# Patient Record
Sex: FEMALE | Race: WHITE | NOT HISPANIC OR LATINO | Employment: OTHER | URBAN - METROPOLITAN AREA
[De-identification: names, ages, dates, MRNs, and addresses within clinical notes are randomized per-mention and may not be internally consistent; named-entity substitution may affect disease eponyms.]

---

## 2020-07-05 ENCOUNTER — APPOINTMENT (EMERGENCY)
Dept: RADIOLOGY | Facility: HOSPITAL | Age: 75
DRG: 557 | End: 2020-07-05
Payer: MEDICARE

## 2020-07-05 ENCOUNTER — HOSPITAL ENCOUNTER (INPATIENT)
Facility: HOSPITAL | Age: 75
LOS: 9 days | DRG: 557 | End: 2020-07-14
Attending: EMERGENCY MEDICINE | Admitting: GENERAL PRACTICE
Payer: MEDICARE

## 2020-07-05 DIAGNOSIS — E43 SEVERE PROTEIN-CALORIE MALNUTRITION (HCC): ICD-10-CM

## 2020-07-05 DIAGNOSIS — J18.9 PNEUMONIA: Primary | ICD-10-CM

## 2020-07-05 DIAGNOSIS — N18.6 ESRD (END STAGE RENAL DISEASE) (HCC): ICD-10-CM

## 2020-07-05 DIAGNOSIS — F03.90 DEMENTIA (HCC): ICD-10-CM

## 2020-07-05 PROBLEM — Z86.73 HISTORY OF CVA (CEREBROVASCULAR ACCIDENT): Status: ACTIVE | Noted: 2020-07-05

## 2020-07-05 PROBLEM — R74.8 ELEVATED CPK: Status: ACTIVE | Noted: 2020-07-05

## 2020-07-05 PROBLEM — R77.8 ELEVATED TROPONIN: Status: ACTIVE | Noted: 2020-07-05

## 2020-07-05 PROBLEM — M06.9 RA (RHEUMATOID ARTHRITIS) (HCC): Status: ACTIVE | Noted: 2020-07-05

## 2020-07-05 PROBLEM — I25.10 CAD (CORONARY ARTERY DISEASE): Status: ACTIVE | Noted: 2020-07-05

## 2020-07-05 PROBLEM — R26.2 AMBULATORY DYSFUNCTION: Status: ACTIVE | Noted: 2020-07-05

## 2020-07-05 PROBLEM — R56.9 SEIZURE (HCC): Status: ACTIVE | Noted: 2020-07-05

## 2020-07-05 PROBLEM — I16.0 HYPERTENSIVE URGENCY: Status: ACTIVE | Noted: 2020-07-05

## 2020-07-05 LAB
ALBUMIN SERPL BCP-MCNC: 2.8 G/DL (ref 3.5–5)
ALP SERPL-CCNC: 129 U/L (ref 46–116)
ALT SERPL W P-5'-P-CCNC: 54 U/L (ref 12–78)
ANION GAP SERPL CALCULATED.3IONS-SCNC: 8 MMOL/L (ref 4–13)
AST SERPL W P-5'-P-CCNC: 70 U/L (ref 5–45)
ATRIAL RATE: 74 BPM
BACTERIA UR QL AUTO: ABNORMAL /HPF
BASOPHILS # BLD AUTO: 0.02 THOUSANDS/ΜL (ref 0–0.1)
BASOPHILS NFR BLD AUTO: 1 % (ref 0–1)
BILIRUB SERPL-MCNC: 1 MG/DL (ref 0.2–1)
BILIRUB UR QL STRIP: NEGATIVE
BUN SERPL-MCNC: 37 MG/DL (ref 5–25)
CALCIUM SERPL-MCNC: 9.5 MG/DL (ref 8.3–10.1)
CHLORIDE SERPL-SCNC: 102 MMOL/L (ref 100–108)
CK MB SERPL-MCNC: 293.2 NG/ML (ref 0–5)
CK MB SERPL-MCNC: 33.5 % (ref 0–2.5)
CK SERPL-CCNC: 874 U/L (ref 26–192)
CLARITY UR: ABNORMAL
CO2 SERPL-SCNC: 29 MMOL/L (ref 21–32)
COLOR UR: YELLOW
CREAT SERPL-MCNC: 2.59 MG/DL (ref 0.6–1.3)
EOSINOPHIL # BLD AUTO: 0.01 THOUSAND/ΜL (ref 0–0.61)
EOSINOPHIL NFR BLD AUTO: 0 % (ref 0–6)
ERYTHROCYTE [DISTWIDTH] IN BLOOD BY AUTOMATED COUNT: 15.7 % (ref 11.6–15.1)
GFR SERPL CREATININE-BSD FRML MDRD: 18 ML/MIN/1.73SQ M
GLUCOSE SERPL-MCNC: 74 MG/DL (ref 65–140)
GLUCOSE UR STRIP-MCNC: NEGATIVE MG/DL
HCT VFR BLD AUTO: 34.4 % (ref 34.8–46.1)
HGB BLD-MCNC: 10.9 G/DL (ref 11.5–15.4)
HGB UR QL STRIP.AUTO: ABNORMAL
HYALINE CASTS #/AREA URNS LPF: ABNORMAL /LPF
IMM GRANULOCYTES # BLD AUTO: 0.01 THOUSAND/UL (ref 0–0.2)
IMM GRANULOCYTES NFR BLD AUTO: 0 % (ref 0–2)
KETONES UR STRIP-MCNC: ABNORMAL MG/DL
LACTATE SERPL-SCNC: 1.5 MMOL/L (ref 0.5–2)
LEUKOCYTE ESTERASE UR QL STRIP: ABNORMAL
LYMPHOCYTES # BLD AUTO: 0.53 THOUSANDS/ΜL (ref 0.6–4.47)
LYMPHOCYTES NFR BLD AUTO: 19 % (ref 14–44)
MCH RBC QN AUTO: 31.6 PG (ref 26.8–34.3)
MCHC RBC AUTO-ENTMCNC: 31.7 G/DL (ref 31.4–37.4)
MCV RBC AUTO: 100 FL (ref 82–98)
MONOCYTES # BLD AUTO: 0.19 THOUSAND/ΜL (ref 0.17–1.22)
MONOCYTES NFR BLD AUTO: 7 % (ref 4–12)
NEUTROPHILS # BLD AUTO: 2.03 THOUSANDS/ΜL (ref 1.85–7.62)
NEUTS SEG NFR BLD AUTO: 73 % (ref 43–75)
NITRITE UR QL STRIP: NEGATIVE
NON-SQ EPI CELLS URNS QL MICRO: ABNORMAL /HPF
NRBC BLD AUTO-RTO: 0 /100 WBCS
P AXIS: 65 DEGREES
PH UR STRIP.AUTO: 7 [PH] (ref 4.5–8)
PLATELET # BLD AUTO: 77 THOUSANDS/UL (ref 149–390)
PMV BLD AUTO: 9.5 FL (ref 8.9–12.7)
POTASSIUM SERPL-SCNC: 4.2 MMOL/L (ref 3.5–5.3)
PR INTERVAL: 168 MS
PROLACTIN SERPL-MCNC: 22 NG/ML
PROT SERPL-MCNC: 6.8 G/DL (ref 6.4–8.2)
PROT UR STRIP-MCNC: >=300 MG/DL
QRS AXIS: 7 DEGREES
QRSD INTERVAL: 80 MS
QT INTERVAL: 432 MS
QTC INTERVAL: 479 MS
RBC # BLD AUTO: 3.45 MILLION/UL (ref 3.81–5.12)
RBC #/AREA URNS AUTO: ABNORMAL /HPF
SARS-COV-2 RNA RESP QL NAA+PROBE: NEGATIVE
SODIUM SERPL-SCNC: 139 MMOL/L (ref 136–145)
SP GR UR STRIP.AUTO: 1.02 (ref 1–1.03)
T WAVE AXIS: 56 DEGREES
TROPONIN I SERPL-MCNC: 0.07 NG/ML
TROPONIN I SERPL-MCNC: 0.08 NG/ML
TROPONIN I SERPL-MCNC: 0.09 NG/ML
UROBILINOGEN UR QL STRIP.AUTO: 0.2 E.U./DL
VENTRICULAR RATE: 74 BPM
WBC # BLD AUTO: 2.79 THOUSAND/UL (ref 4.31–10.16)
WBC #/AREA URNS AUTO: ABNORMAL /HPF

## 2020-07-05 PROCEDURE — 87081 CULTURE SCREEN ONLY: CPT | Performed by: GENERAL PRACTICE

## 2020-07-05 PROCEDURE — 70450 CT HEAD/BRAIN W/O DYE: CPT

## 2020-07-05 PROCEDURE — 87040 BLOOD CULTURE FOR BACTERIA: CPT | Performed by: EMERGENCY MEDICINE

## 2020-07-05 PROCEDURE — 83605 ASSAY OF LACTIC ACID: CPT | Performed by: EMERGENCY MEDICINE

## 2020-07-05 PROCEDURE — 72125 CT NECK SPINE W/O DYE: CPT

## 2020-07-05 PROCEDURE — 84484 ASSAY OF TROPONIN QUANT: CPT | Performed by: PHYSICIAN ASSISTANT

## 2020-07-05 PROCEDURE — 93005 ELECTROCARDIOGRAM TRACING: CPT

## 2020-07-05 PROCEDURE — 99285 EMERGENCY DEPT VISIT HI MDM: CPT | Performed by: EMERGENCY MEDICINE

## 2020-07-05 PROCEDURE — 82553 CREATINE MB FRACTION: CPT | Performed by: EMERGENCY MEDICINE

## 2020-07-05 PROCEDURE — 82550 ASSAY OF CK (CPK): CPT | Performed by: EMERGENCY MEDICINE

## 2020-07-05 PROCEDURE — 84146 ASSAY OF PROLACTIN: CPT | Performed by: PHYSICIAN ASSISTANT

## 2020-07-05 PROCEDURE — 99285 EMERGENCY DEPT VISIT HI MDM: CPT

## 2020-07-05 PROCEDURE — 99223 1ST HOSP IP/OBS HIGH 75: CPT | Performed by: GENERAL PRACTICE

## 2020-07-05 PROCEDURE — 36415 COLL VENOUS BLD VENIPUNCTURE: CPT | Performed by: EMERGENCY MEDICINE

## 2020-07-05 PROCEDURE — 84484 ASSAY OF TROPONIN QUANT: CPT | Performed by: EMERGENCY MEDICINE

## 2020-07-05 PROCEDURE — 80053 COMPREHEN METABOLIC PANEL: CPT | Performed by: EMERGENCY MEDICINE

## 2020-07-05 PROCEDURE — 93010 ELECTROCARDIOGRAM REPORT: CPT | Performed by: INTERNAL MEDICINE

## 2020-07-05 PROCEDURE — 81001 URINALYSIS AUTO W/SCOPE: CPT

## 2020-07-05 PROCEDURE — U0003 INFECTIOUS AGENT DETECTION BY NUCLEIC ACID (DNA OR RNA); SEVERE ACUTE RESPIRATORY SYNDROME CORONAVIRUS 2 (SARS-COV-2) (CORONAVIRUS DISEASE [COVID-19]), AMPLIFIED PROBE TECHNIQUE, MAKING USE OF HIGH THROUGHPUT TECHNOLOGIES AS DESCRIBED BY CMS-2020-01-R: HCPCS | Performed by: GENERAL PRACTICE

## 2020-07-05 PROCEDURE — 85025 COMPLETE CBC W/AUTO DIFF WBC: CPT | Performed by: EMERGENCY MEDICINE

## 2020-07-05 PROCEDURE — 99223 1ST HOSP IP/OBS HIGH 75: CPT | Performed by: INTERNAL MEDICINE

## 2020-07-05 PROCEDURE — 71046 X-RAY EXAM CHEST 2 VIEWS: CPT

## 2020-07-05 PROCEDURE — 87493 C DIFF AMPLIFIED PROBE: CPT | Performed by: GENERAL PRACTICE

## 2020-07-05 PROCEDURE — 96365 THER/PROPH/DIAG IV INF INIT: CPT

## 2020-07-05 RX ORDER — METOPROLOL TARTRATE 50 MG/1
50 TABLET, FILM COATED ORAL EVERY 12 HOURS SCHEDULED
Status: DISCONTINUED | OUTPATIENT
Start: 2020-07-05 | End: 2020-07-05

## 2020-07-05 RX ORDER — HYDROXYCHLOROQUINE SULFATE 200 MG/1
200 TABLET, FILM COATED ORAL
Status: DISCONTINUED | OUTPATIENT
Start: 2020-07-06 | End: 2020-07-14

## 2020-07-05 RX ORDER — METOPROLOL TARTRATE 50 MG/1
50 TABLET, FILM COATED ORAL EVERY 12 HOURS SCHEDULED
COMMUNITY
End: 2020-07-14 | Stop reason: HOSPADM

## 2020-07-05 RX ORDER — METOPROLOL TARTRATE 5 MG/5ML
5 INJECTION INTRAVENOUS EVERY 6 HOURS
Status: DISCONTINUED | OUTPATIENT
Start: 2020-07-05 | End: 2020-07-14

## 2020-07-05 RX ORDER — CHOLESTYRAMINE 4 G/9G
1 POWDER, FOR SUSPENSION ORAL 2 TIMES DAILY WITH MEALS
COMMUNITY
End: 2020-07-14 | Stop reason: HOSPADM

## 2020-07-05 RX ORDER — TRAZODONE HYDROCHLORIDE 50 MG/1
50 TABLET ORAL
Status: DISCONTINUED | OUTPATIENT
Start: 2020-07-05 | End: 2020-07-14

## 2020-07-05 RX ORDER — SENNOSIDES 8.6 MG
1 TABLET ORAL DAILY
Status: DISCONTINUED | OUTPATIENT
Start: 2020-07-05 | End: 2020-07-14

## 2020-07-05 RX ORDER — PANTOPRAZOLE SODIUM 40 MG/1
40 TABLET, DELAYED RELEASE ORAL DAILY
Status: DISCONTINUED | OUTPATIENT
Start: 2020-07-05 | End: 2020-07-10

## 2020-07-05 RX ORDER — ACETAMINOPHEN 325 MG/1
650 TABLET ORAL EVERY 6 HOURS PRN
Status: DISCONTINUED | OUTPATIENT
Start: 2020-07-05 | End: 2020-07-07

## 2020-07-05 RX ORDER — HEPARIN SODIUM 5000 [USP'U]/ML
5000 INJECTION, SOLUTION INTRAVENOUS; SUBCUTANEOUS EVERY 8 HOURS SCHEDULED
Status: DISCONTINUED | OUTPATIENT
Start: 2020-07-05 | End: 2020-07-14

## 2020-07-05 RX ORDER — CHOLESTYRAMINE LIGHT 4 G/5.7G
4 POWDER, FOR SUSPENSION ORAL 2 TIMES DAILY
Status: DISCONTINUED | OUTPATIENT
Start: 2020-07-05 | End: 2020-07-14

## 2020-07-05 RX ORDER — FOLIC ACID 1 MG/1
1 TABLET ORAL DAILY
Status: DISCONTINUED | OUTPATIENT
Start: 2020-07-05 | End: 2020-07-14

## 2020-07-05 RX ORDER — PANTOPRAZOLE SODIUM 40 MG/1
40 TABLET, DELAYED RELEASE ORAL DAILY
COMMUNITY
End: 2020-07-14 | Stop reason: HOSPADM

## 2020-07-05 RX ORDER — FOLIC ACID 1 MG/1
TABLET ORAL DAILY
COMMUNITY
End: 2020-07-14 | Stop reason: HOSPADM

## 2020-07-05 RX ORDER — LIDOCAINE AND PRILOCAINE 25; 25 MG/G; MG/G
CREAM TOPICAL SEE ADMIN INSTRUCTIONS
COMMUNITY
End: 2020-07-14 | Stop reason: HOSPADM

## 2020-07-05 RX ORDER — CHOLECALCIFEROL (VITAMIN D3) 10 MCG
1 TABLET ORAL
Status: DISCONTINUED | OUTPATIENT
Start: 2020-07-05 | End: 2020-07-14

## 2020-07-05 RX ORDER — AMLODIPINE BESYLATE 2.5 MG/1
2.5 TABLET ORAL DAILY
Status: DISCONTINUED | OUTPATIENT
Start: 2020-07-05 | End: 2020-07-06

## 2020-07-05 RX ORDER — TRAZODONE HYDROCHLORIDE 50 MG/1
50 TABLET ORAL DAILY
COMMUNITY
End: 2020-07-14 | Stop reason: HOSPADM

## 2020-07-05 RX ORDER — CALCIUM CARBONATE 200(500)MG
1000 TABLET,CHEWABLE ORAL DAILY PRN
Status: DISCONTINUED | OUTPATIENT
Start: 2020-07-05 | End: 2020-07-14 | Stop reason: HOSPADM

## 2020-07-05 RX ORDER — LEVETIRACETAM 500 MG/1
500 TABLET ORAL
Status: DISCONTINUED | OUTPATIENT
Start: 2020-07-07 | End: 2020-07-05

## 2020-07-05 RX ORDER — LEVETIRACETAM 500 MG/1
500 TABLET ORAL SEE ADMIN INSTRUCTIONS
COMMUNITY
End: 2020-07-14 | Stop reason: HOSPADM

## 2020-07-05 RX ORDER — LEVETIRACETAM 500 MG/1
250 TABLET ORAL
Status: DISCONTINUED | OUTPATIENT
Start: 2020-07-06 | End: 2020-07-05

## 2020-07-05 RX ORDER — LEVETIRACETAM 500 MG/1
500 TABLET ORAL DAILY
Status: DISCONTINUED | OUTPATIENT
Start: 2020-07-05 | End: 2020-07-05

## 2020-07-05 RX ORDER — HYDROXYCHLOROQUINE SULFATE 200 MG/1
200 TABLET, FILM COATED ORAL
COMMUNITY
End: 2020-07-14 | Stop reason: HOSPADM

## 2020-07-05 RX ORDER — VANCOMYCIN HYDROCHLORIDE 1 G/200ML
20 INJECTION, SOLUTION INTRAVENOUS ONCE
Status: COMPLETED | OUTPATIENT
Start: 2020-07-05 | End: 2020-07-05

## 2020-07-05 RX ORDER — ONDANSETRON 2 MG/ML
4 INJECTION INTRAMUSCULAR; INTRAVENOUS EVERY 6 HOURS PRN
Status: DISCONTINUED | OUTPATIENT
Start: 2020-07-05 | End: 2020-07-14 | Stop reason: HOSPADM

## 2020-07-05 RX ADMIN — VANCOMYCIN HYDROCHLORIDE 1000 MG: 1 INJECTION, SOLUTION INTRAVENOUS at 11:55

## 2020-07-05 RX ADMIN — FOLIC ACID 1 MG: 1 TABLET ORAL at 15:18

## 2020-07-05 RX ADMIN — Medication 1 CAPSULE: at 15:18

## 2020-07-05 RX ADMIN — METOPROLOL TARTRATE 5 MG: 5 INJECTION INTRAVENOUS at 16:04

## 2020-07-05 RX ADMIN — CEFEPIME HYDROCHLORIDE 2000 MG: 2 INJECTION, POWDER, FOR SOLUTION INTRAVENOUS at 11:21

## 2020-07-05 RX ADMIN — METOPROLOL TARTRATE 50 MG: 50 TABLET, FILM COATED ORAL at 15:17

## 2020-07-05 RX ADMIN — METOPROLOL TARTRATE 5 MG: 5 INJECTION INTRAVENOUS at 21:13

## 2020-07-05 RX ADMIN — STANDARDIZED SENNA CONCENTRATE 8.6 MG: 8.6 TABLET ORAL at 15:17

## 2020-07-05 RX ADMIN — HEPARIN SODIUM 5000 UNITS: 5000 INJECTION INTRAVENOUS; SUBCUTANEOUS at 15:18

## 2020-07-05 RX ADMIN — LEVETIRACETAM 500 MG: 500 TABLET, FILM COATED ORAL at 15:17

## 2020-07-05 RX ADMIN — PANTOPRAZOLE SODIUM 40 MG: 40 TABLET, DELAYED RELEASE ORAL at 15:18

## 2020-07-05 RX ADMIN — HEPARIN SODIUM 5000 UNITS: 5000 INJECTION INTRAVENOUS; SUBCUTANEOUS at 21:12

## 2020-07-05 NOTE — ASSESSMENT & PLAN NOTE
New resident at [facility], reportedly had unwitnessed fall today out of bed   Trauma imaging negative for acute fx  Consider infx etiology - CXR shows possible infiltrate but pt asx without fever, monitor off abx   PT/OT consults prior to return to dementia unit   Pt has dementia, unknown if there was true LOC   Consider possibility of breakthrough seizure - monitor closely   Fall precautions

## 2020-07-05 NOTE — H&P
H&P- July Tamica 1945, 70 y o  female MRN: [id number]  [facility] Encounter: [id number] DOS: 7/5/2020  Primary Care Provider: No primary care provider on file  Date and time admitted to hospital: 7/5/2020  8:22 AM    * Ambulatory dysfunction  Assessment & Plan  New resident at [facility], reportedly had unwitnessed fall today out of bed   Trauma imaging negative for acute fx  Consider infx etiology - CXR shows possible infiltrate but pt asx without fever, monitor off abx   PT/OT consults prior to return to dementia unit   Pt has dementia, unknown if there was true LOC   Consider possibility of breakthrough seizure - monitor closely   Fall precautions     Seizure [facility])  Assessment & Plan  Hx of, consider possibility of breakthrough sz   Continue keppra   sz precautions     Elevated troponin  Assessment & Plan  Troponin 0 09, will trend x 3   Consider possibility of non mi troponin elevation  in the setting of fall, possible seizure, mild rhabdo, underlying ESRD vs nstemi type 2 in the setting of htn urgency   EKG NSR with PACs, repeat EKG in AM  Monitor on tele overnight     Hypertensive urgency  Assessment & Plan  Resume lopressor 50mg BID   Add PRN hydralazine  HD tomorrow     Dementia [facility])  Assessment & Plan  Continue trazodone at bedtime   Moved in dementia unit [facility] on Friday 7/3  Delirium precautions     ESRD (end stage renal disease) ([facility] [address] )  Assessment & Plan  HD MWF   C/s nephrology for management     VTE Prophylaxis: Heparin  / sequential compression device   Code Status: DNR DNI confirmed with partner rajeev   POLST: POLST form is not discussed and not completed at this time  Discussion with family: called partner     Anticipated Length of Stay:  Patient will be admitted on an Inpatient basis with an anticipated length of stay of  > 2 midnights  Justification for Hospital Stay: elevated trop, fall, pt ot     Total Time for Visit, including Counseling / Coordination of Care: 45 minutes  Greater than 50% of this total time spent on direct patient counseling and coordination of care  Chief Complaint:   Fall at SNF     History of Present Illness:    Dontae Barrera is a 70 y o  female w PMH + for dementia, ambulatory dysfunction, ESRD, HTN, Sz d/o who presents with fall at dementia unit  Patient is a poor historian  I personally called [facility] and spoke with a caregiver who states patient was found in her room on the floor and complaining of pain  Unable to specify where pain was  States her mental status was at her baseline, "light confusion " patient also tells me she has pain but cannot locate  Called pts partner Dian Kramer who states patient often climbs out of bed and slides to the ground and not actually falling, just getting out of bed  States Kari Hairston just moved to [facility] 3 days ago and Dian Williams wonders if it is due to adjustment  Has ha    Had heart attack, progressive CKD to ESRD, and a stroke 1 year ago  Has had declined with dementia since  She had live in aide at home but pt was getting difficult to handle at home and she was admitted to [facility] on Friday  States she is dnr dni  Review of Systems:    Review of Systems   Unable to perform ROS: Dementia     Past Medical and Surgical History:     Past Medical History:   Diagnosis Date    Dialysis patient ([facility] [address] )     Epilepsy ([facility] [address] )     Kidney disease     Seizure ([facility] )        History reviewed  No pertinent surgical history  Meds/Allergies:    Prior to Admission medications    Medication Sig Start Date End Date Taking?  Authorizing Provider   B Complex-C-Folic Acid (HORTENSIA-DAVIAN PO) Take by mouth daily   Yes Historical Provider, MD   cholestyramine (QUESTRAN) 4 g packet Take 1 packet by mouth 2 (two) times a day with meals   Yes Historical Provider, MD   folic acid (FOLVITE) 1 mg tablet Take by mouth daily   Yes Historical Provider, MD   hydroxychloroquine (PLAQUENIL) 200 mg tablet Take 200 mg by mouth daily with breakfast   Yes Historical Provider, MD   levETIRAcetam (KEPPRA) 500 mg tablet Take 500 mg by mouth see administration instructions 1 tablet daily  1/2 tablet a day every mon wed fri on dialysis days   Yes Historical Provider, MD   lidocaine-prilocaine (EMLA) cream Apply topically see administration instructions Apply to arm 1 hour before dialysis on Monday, Wednesday, Friday   Yes Historical Provider, MD   metoprolol tartrate (LOPRESSOR) 50 mg tablet Take 50 mg by mouth every 12 (twelve) hours   Yes Historical Provider, MD   Nutritional Supplements (NEPRO/CARBSTEADY PO) Take by mouth   Yes Historical Provider, MD   Pancrelipase, Lip-Prot-Amyl, (Zenpep) 14349-59188 units CPEP Take by mouth 3 (three) times a day   Yes Historical Provider, MD   pantoprazole (PROTONIX) 40 mg tablet Take 40 mg by mouth daily   Yes Historical Provider, MD   traZODone (DESYREL) 50 mg tablet Take 50 mg by mouth daily   Yes Historical Provider, MD     I have reveiwed home medications using records provided by [facility]  Allergies: No Known Allergies    Social History:     Marital Status: Unknown   Occupation: unknown   Patient Pre-hospital Living Situation: just moved to [facility]   Patient Pre-hospital Level of Mobility: unknown   Patient Pre-hospital Diet Restrictions: mechanical soft   Substance Use History:   Social History     Substance and Sexual Activity   Alcohol Use Not Currently     Social History     Tobacco Use   Smoking Status Unknown If Ever Smoked     Social History     Substance and Sexual Activity   Drug Use Never       Family History:    History reviewed  No pertinent family history  Physical Exam:     Vitals:   Blood Pressure: (!) 182/75 (07/05/20 1433)  Pulse: 89 (07/05/20 1433)  Temperature: 97 5 °F (36 4 °C) (07/05/20 1433)  Temp Source: Rectal (07/05/20 0840)  Respirations: 16 (07/05/20 1433)  Weight - Scale: 54 4 kg (119 lb 14 9 oz) (07/05/20 1438)  SpO2: 97 % (07/05/20 1433)    Physical Exam   Constitutional: She appears well-developed   No distress  Thin elderly female trying to get out of bed    HENT:   Head: Normocephalic and atraumatic  Eyes: EOM are normal  No scleral icterus  Neck: Normal range of motion  Neck supple  Cardiovascular: Normal rate, regular rhythm and normal heart sounds  No murmur heard  Pulmonary/Chest: Effort normal and breath sounds normal    Abdominal: Soft  Bowel sounds are normal    Musculoskeletal: Normal range of motion  She exhibits edema  Neurological: She is alert  Oriented to self and todays date    Skin: Skin is warm and dry  Psychiatric:   Pleasantly confused      Additional Data:     Lab Results: I have personally reviewed pertinent reports  Results from last 7 days   Lab Units 07/05/20  0911   WBC Thousand/uL 2 79*   HEMOGLOBIN g/dL 10 9*   HEMATOCRIT % 34 4*   PLATELETS Thousands/uL 77*   NEUTROS PCT % 73   LYMPHS PCT % 19   MONOS PCT % 7   EOS PCT % 0     Results from last 7 days   Lab Units 07/05/20  0911   SODIUM mmol/L 139   POTASSIUM mmol/L 4 2   CHLORIDE mmol/L 102   CO2 mmol/L 29   BUN mg/dL 37*   CREATININE mg/dL 2 59*   ANION GAP mmol/L 8   CALCIUM mg/dL 9 5   ALBUMIN g/dL 2 8*   TOTAL BILIRUBIN mg/dL 1 00   ALK PHOS U/L 129*   ALT U/L 54   AST U/L 70*   GLUCOSE RANDOM mg/dL 74                 Results from last 7 days   Lab Units 07/05/20  1109   LACTIC ACID mmol/L 1 5       Imaging: I have personally reviewed pertinent reports  XR chest 2 views   ED Interpretation by Adelaide Benson MD (07/05 1035)   RML infiltrate      Final Result by Titi Isidro MD (07/05 7786)      Increased opacity in the right middle lobe with retraction of the minor fissure, at least in part due to atelectasis  Superimposed pneumonia cannot be excluded  Small left pleural effusion and left base atelectasis  Superimposed pneumonia cannot be excluded              Workstation performed: [id number]         CT head without contrast   Final Result by Manoj Chase MD (07/05 8078)      No acute intracranial process  No skull fracture  Chronic microangiopathy and small bifrontal cortical infarcts  Workstation performed: [id number]         CT cervical spine without contrast   Final Result by Sheryl Boas, MD (07/05 3746)      No cervical spine fracture or traumatic malalignment  Mild multilevel cervical degenerative changes most prominent at C4-5 and C5-6  Workstation performed: [id number]             EKG, Pathology, and Other Studies Reviewed on Admission:   · EKG: nsr with pacs    Allscripts / Epic Records Reviewed: Yes     ** Please Note: This note has been constructed using a voice recognition system   **

## 2020-07-05 NOTE — CONSULTS
Consultation - Nephrology   Nelta Litten 70 y o  female MRN: [id number]  Unit/Bed#: [facility] 913-01 Encounter: [id number]      Assessment/Plan:  1  End-stage renal disease reportedly Monday Wednesday Friday, patient is a resident of [facility]  2  Mildly elevated CPK, 174, possibly secondary to repeated falls  3  Anemia of chronic disease hemoglobin at 10 9  4  Ambulatory dysfunction with reported fall  5  Seizure disorder currently on Keppra  6  Accelerated hypertension, patient does appear anxious, would continue with Lopressor, add low-dose calcium channel blocker  7  Suspicion for pneumonia currently on cefepime  8  Underlying dementia currently in dementia unit at [facility]:  · Plan for maintenance hemodialysis, currently Monday Wednesday Friday  · Obtain records from outpatient dialysis unit  · Continue monitor CPK  · Add calcium channel blocker given uncontrolled hypertension, continue with metoprolol    History of Present Illness   Physician Requesting Consult: Rose Kimble DO  Reason for Consult / Principal Problem:  End-stage renal disease  HPI: Nelta Litten is a 70y o  year old female who presents with fall  Patient is a 81-year-old female with a past medical history significant for end-stage renal disease apparently maintenance hemodialysis Monday Wednesday Friday although resident at [facility]  , underlying dementia, ambulatory dysfunction, hypertension, seizure disorder who presents after fall at the dementia unit  Caregiver at nursing home found patient in her room on the floor and complaining of pain  Patient was unable to localize pain  Also according to the patient's partner apparently patient often climbs out of bed and had recently moved into the dementia unit      Review of systems unobtainable secondary to patient's underlying dementia  History obtained from chart review and unobtainable from patient due to mental status    Review of Systems    Pertinent findings of a 10 point review of systems noted above otherwise all others negative    Historical Information   Patient Active Problem List   Diagnosis    ESRD (end stage renal disease) ([place] [address] )    Ambulatory dysfunction    Dementia ([place] [address] )    Hypertensive urgency    Elevated troponin    Elevated CPK    Seizure ([place] [address] )    CAD (coronary artery disease)    History of CVA (cerebrovascular accident)    RA (rheumatoid arthritis) ([place] [address] )     Past Medical History:   Diagnosis Date    Dialysis patient ([place] [address] )     Epilepsy ([place] [address] )     Kidney disease     Seizure ([place] [address] )      History reviewed  No pertinent surgical history  Social History   Social History     Substance and Sexual Activity   Alcohol Use Not Currently     Social History     Substance and Sexual Activity   Drug Use Never     Social History     Tobacco Use   Smoking Status Unknown If Ever Smoked     History reviewed  No pertinent family history      Meds/Allergies      current meds:   Current Facility-Administered Medications   Medication Dose Route Frequency    acetaminophen (TYLENOL) tablet 650 mg  650 mg Oral Q6H PRN    b complex-vitamin C-folic acid (NEPHROCAPS) capsule 1 capsule  1 capsule Oral Daily With Dinner    calcium carbonate (TUMS) chewable tablet 1,000 mg  1,000 mg Oral Daily PRN    cholestyramine sugar free (QUESTRAN LIGHT) packet 4 g  4 g Oral BID    folic acid (FOLVITE) tablet 1 mg  1 mg Oral Daily    heparin (porcine) subcutaneous injection 5,000 Units  5,000 Units Subcutaneous Q8H [address]    [START ON 7/6/2020] hydroxychloroquine (PLAQUENIL) tablet 200 mg  200 mg Oral Daily With Breakfast    [START ON 7/7/2020] levETIRAcetam (KEPPRA) 500 mg in sodium chloride 0 9 % 100 mL IVPB  500 mg Intravenous Once per day on Sun Tue Thu Sat    [START ON 7/6/2020] levETIRAcetam (KEPPRA) 750 mg in sodium chloride 0 9 % 100 mL IVPB  750 mg Intravenous Once per day on Mon Wed Fri    metoprolol (LOPRESSOR) injection 5 mg  5 mg Intravenous Q6H    ondansetron (ZOFRAN) injection 4 mg  4 mg Intravenous Q6H PRN    pantoprazole (PROTONIX) EC tablet 40 mg  40 mg Oral Daily    senna (SENOKOT) tablet 8 6 mg  1 tablet Oral Daily    traZODone (DESYREL) tablet 50 mg  50 mg Oral HS       No Known Allergies      Objective   BP (!) 182/75   Pulse 89   Temp 97 5 °F (36 4 °C)   Resp 16   Wt 54 4 kg (119 lb 14 9 oz)   SpO2 97%     Intake/Output Summary (Last 24 hours) at 7/5/2020 1610  Last data filed at 7/5/2020 1153  Gross per 24 hour   Intake 53 33 ml   Output    Net 53 33 ml       Current Weight: Weight - Scale: 54 4 kg (119 lb 14 9 oz)    Physical Exam   Constitutional: No distress  HENT:   Head: Normocephalic and atraumatic  Eyes: No scleral icterus  Neck: Neck supple  Cardiovascular: Normal rate and regular rhythm  Pulmonary/Chest: Effort normal and breath sounds normal    Abdominal: Soft  She exhibits no distension  There is no tenderness  Musculoskeletal: She exhibits no edema or deformity  AV access, right upper arm with positive bruit and thrill   Lymphadenopathy:     She has no cervical adenopathy (No obvious adenopathy)  Neurological: She is alert  Skin: Skin is warm and dry  No rash noted     Psychiatric:   Anxious appearing         Lab Results:    Results from last 7 days   Lab Units 07/05/20  0911   WBC Thousand/uL 2 79*   HEMOGLOBIN g/dL 10 9*   HEMATOCRIT % 34 4*   PLATELETS Thousands/uL 77*     Results from last 7 days   Lab Units 07/05/20  0911   POTASSIUM mmol/L 4 2   CHLORIDE mmol/L 102   CO2 mmol/L 29   BUN mg/dL 37*   CREATININE mg/dL 2 59*   CALCIUM mg/dL 9 5

## 2020-07-05 NOTE — PROGRESS NOTES
Patient with confusion, dementia  Able to provide meds but spit out  Notified PA  Ordered some meds IV, bp med given IV  Also refused 1700 meds  Re-atttempted 3 times without success, showed increased agitation

## 2020-07-05 NOTE — ASSESSMENT & PLAN NOTE
Troponin 0 09, will trend x 3   Consider possibility of non mi troponin elevation  in the setting of fall, possible seizure, mild rhabdo, underlying ESRD vs nstemi type 2 in the setting of htn urgency   EKG NSR with PACs, repeat EKG in AM  Monitor on tele overnight

## 2020-07-05 NOTE — ED PROVIDER NOTES
History  Chief Complaint   Patient presents with    Fall     Dementia resident of [facility] reported to have unwitnessed fall this morning, was found on the ground     81yo female with history of dementia coming from [facility] after presumed unwitnessed fall  Patient states she fell, does not remember what happened  ESRD MWF, last session on Friday nursing facility believes was full session  Uses wheelchair, undergarments wet this AM  Facility staff member unsure patient's baseline as she is new to facility  Prior to Admission Medications   Prescriptions Last Dose Informant Patient Reported? Taking?    B Complex-C-Folic Acid (HORTENSIA-DAVIAN PO)   Yes Yes   Sig: Take by mouth daily   Nutritional Supplements (NEPRO/CARBSTEADY PO)   Yes Yes   Sig: Take by mouth   Pancrelipase, Lip-Prot-Amyl, (Zenpep) 79381-73480 units CPEP   Yes Yes   Sig: Take by mouth 3 (three) times a day   cholestyramine (QUESTRAN) 4 g packet   Yes Yes   Sig: Take 1 packet by mouth 2 (two) times a day with meals   folic acid (FOLVITE) 1 mg tablet   Yes Yes   Sig: Take by mouth daily   hydroxychloroquine (PLAQUENIL) 200 mg tablet   Yes Yes   Sig: Take 200 mg by mouth daily with breakfast   levETIRAcetam (KEPPRA) 500 mg tablet   Yes Yes   Sig: Take 500 mg by mouth see administration instructions 1 tablet daily  1/2 tablet a day every mon wed fri on dialysis days   lidocaine-prilocaine (EMLA) cream   Yes Yes   Sig: Apply topically see administration instructions Apply to arm 1 hour before dialysis on Monday, Wednesday, Friday   metoprolol tartrate (LOPRESSOR) 50 mg tablet   Yes Yes   Sig: Take 50 mg by mouth every 12 (twelve) hours   pantoprazole (PROTONIX) 40 mg tablet   Yes Yes   Sig: Take 40 mg by mouth daily   traZODone (DESYREL) 50 mg tablet   Yes Yes   Sig: Take 50 mg by mouth daily      Facility-Administered Medications: None       Past Medical History:   Diagnosis Date    Dialysis patient ([facility] [address] )     [facility]  Kidney disease     Seizure [facility])        History reviewed  No pertinent surgical history  History reviewed  No pertinent family history  I have reviewed and agree with the history as documented  E-Cigarette/Vaping    E-Cigarette Use Never User      E-Cigarette/Vaping Substances    Nicotine No     THC No     CBD No     Flavoring No     Other No     Unknown No      Social History     Tobacco Use    Smoking status: Unknown If Ever Smoked   Substance Use Topics    Alcohol use: Not Currently    Drug use: Never        Review of Systems   Unable to perform ROS: Dementia       Physical Exam  ED Triage Vitals   Temperature Pulse Respirations Blood Pressure SpO2   07/05/20 0840 07/05/20 0823 07/05/20 0823 07/05/20 0823 07/05/20 0823   (!) 96 °F (35 6 °C) 64 18 166/72 98 %      Temp Source Heart Rate Source Patient Position - Orthostatic VS BP Location FiO2 (%)   07/05/20 0840 07/05/20 0823 07/05/20 0823 07/05/20 0823 --   Rectal Monitor Lying Left arm       Pain Score       --                    Orthostatic Vital Signs  Vitals:    07/05/20 2238 07/06/20 0000 07/06/20 0412 07/06/20 0648   BP: 141/81  (!) 179/75 (!) 178/81   Pulse:  81 71 78   Patient Position - Orthostatic VS:           Physical Exam   Constitutional: She appears well-developed  Thin, arrives in c-collar   HENT:   Head: Normocephalic and atraumatic  Right Ear: External ear normal    Left Ear: External ear normal    Dry mucus membranes, no hemotympanum, no septal hematoma   Eyes: Pupils are equal, round, and reactive to light  EOM are normal  Right eye exhibits no discharge  Left eye exhibits no discharge  Cardiovascular: Normal rate  B/l edema to lower calves   Pulmonary/Chest: Effort normal  No respiratory distress  Abdominal: Soft  She exhibits no distension  Musculoskeletal: She exhibits edema  She exhibits no deformity     Moves toes, lift b/l legs off beds 1-2 inches, allows passive ROM to b/l legs without complaint; squeezes b/l hands; no tenderness to palpation to b/l ankles, knees, elbows, wrists   Neurological: She is alert  She exhibits normal muscle tone  Skin: Skin is warm  She is not diaphoretic  Left posterior hand skin tear with bleeding controlled   Vitals reviewed        ED Medications  Medications   b complex-vitamin C-folic acid (NEPHROCAPS) capsule 1 capsule ( Oral Canceled Entry 7/5/20 1630)   cholestyramine sugar free (QUESTRAN LIGHT) packet 4 g (4 g Oral Not Given 6/6/40 7881)   folic acid (FOLVITE) tablet 1 mg (1 mg Oral Given 7/5/20 1518)   hydroxychloroquine (PLAQUENIL) tablet 200 mg (has no administration in time range)   traZODone (DESYREL) tablet 50 mg (50 mg Oral Refused 7/5/20 2114)   pantoprazole (PROTONIX) EC tablet 40 mg (40 mg Oral Given 7/5/20 1518)   ondansetron (ZOFRAN) injection 4 mg (has no administration in time range)   senna (SENOKOT) tablet 8 6 mg (8 6 mg Oral Given 7/5/20 1517)   calcium carbonate (TUMS) chewable tablet 1,000 mg (has no administration in time range)   heparin (porcine) subcutaneous injection 5,000 Units (5,000 Units Subcutaneous Given 7/6/20 0555)   acetaminophen (TYLENOL) tablet 650 mg (has no administration in time range)   metoprolol (LOPRESSOR) injection 5 mg (5 mg Intravenous Given 7/6/20 0419)   levETIRAcetam (KEPPRA) 750 mg in sodium chloride 0 9 % 100 mL IVPB (has no administration in time range)   levETIRAcetam (KEPPRA) 500 mg in sodium chloride 0 9 % 100 mL IVPB (has no administration in time range)   amLODIPine (NORVASC) tablet 2 5 mg (2 5 mg Oral Not Given 7/5/20 1716)   cefepime (MAXIPIME) 2 g/50 mL dextrose IVPB (0 mg Intravenous Stopped 7/5/20 1153)   vancomycin (VANCOCIN) IVPB (premix) 1,000 mg 200 mL (1,000 mg Intravenous New Bag 7/5/20 1155)       Diagnostic Studies  Results Reviewed     Procedure Component Value Units Date/Time    Blood culture #1 [[id number]] Collected:  07/05/20 1111    Lab Status:  Preliminary result Specimen:  Blood from Arm, Left Updated:  07/05/20 1601     Blood Culture Received in Microbiology Lab  Culture in Progress  Blood culture #2 [[id number]] Collected:  07/05/20 1109    Lab Status:  Preliminary result Specimen:  Blood from Arm, Left Updated:  07/05/20 1601     Blood Culture Received in Microbiology Lab  Culture in Progress  Prolactin [[id number]]  (Normal) Collected:  07/05/20 0911    Lab Status:  Final result Specimen:  Blood from Arm, Left Updated:  07/05/20 1528     Prolactin 22 0 ng/mL     Urine Microscopic [[id number]]  (Abnormal) Collected:  07/05/20 1319    Lab Status:  Final result Specimen:  Urine, Other Updated:  07/05/20 1401     RBC, UA None Seen /hpf      WBC, UA 4-10 /hpf      Epithelial Cells Occasional /hpf      Bacteria, UA Occasional /hpf      Hyaline Casts, UA 0-3 /lpf     Novel Coronavirus [facility] [[id number]]  (Normal) Collected:  07/05/20 1212    Lab Status:  Final result Specimen:  Nares from Nose Updated:  07/05/20 1340     SARS-CoV-2 Negative    Narrative: The specimen collection materials, transport medium, and/or testing methodology utilized in the production of these test results have been proven to be reliable in a limited validation with an abbreviated program under the Emergency Utilization Authorization provided by the FDA  Testing reported as "Presumptive positive" will be confirmed with secondary testing with a reference laboratory to ensure result accuracy  Clinical caution and judgement should be used with the interpretation of these results with consideration of the clinical impression and other laboratory testing  Testing reported as "Positive" or "Negative" has been proven to be accurate according to standard laboratory validation requirements  All testing is performed with control materials showing appropriate reactivity at standard intervals        Urine Macroscopic, POC [[id number]]  (Abnormal) Collected:  07/05/20 1319    Lab Status:  Final result Specimen:  Urine Updated:  07/05/20 1319     Color, UA Yellow     Clarity, UA Cloudy     pH, UA 7 0     Leukocytes, UA Trace     Nitrite, UA Negative     Protein, UA >=300 mg/dl      Glucose, UA Negative mg/dl      Ketones, UA Trace mg/dl      Urobilinogen, UA 0 2 E U /dl      Bilirubin, UA Negative     Blood, UA Small     Specific [place], UA 1 025    Narrative:       CLINITEK RESULT    Lactic acid, plasma [[id number]]  (Normal) Collected:  07/05/20 1109    Lab Status:  Final result Specimen:  Blood from Arm, Left Updated:  07/05/20 1154     LACTIC ACID 1 5 mmol/L     Narrative:       Result may be elevated if tourniquet was used during collection      CKMB [[id number]]  (Abnormal) Collected:  07/05/20 0911    Lab Status:  Final result Specimen:  Blood from Arm, Left Updated:  07/05/20 0959     CK-MB Index 33 5 %      CK- 2 ng/mL     CBC and differential [[id number]]  (Abnormal) Collected:  07/05/20 0911    Lab Status:  Final result Specimen:  Blood from Arm, Left Updated:  07/05/20 0954     WBC 2 79 Thousand/uL      RBC 3 45 Million/uL      Hemoglobin 10 9 g/dL      Hematocrit 34 4 %       fL      MCH 31 6 pg      MCHC 31 7 g/dL      RDW 15 7 %      MPV 9 5 fL      Platelets 77 Thousands/uL      nRBC 0 /100 WBCs      Neutrophils Relative 73 %      Immat GRANS % 0 %      Lymphocytes Relative 19 %      Monocytes Relative 7 %      Eosinophils Relative 0 %      Basophils Relative 1 %      Neutrophils Absolute 2 03 Thousands/µL      Immature Grans Absolute 0 01 Thousand/uL      Lymphocytes Absolute 0 53 Thousands/µL      Monocytes Absolute 0 19 Thousand/µL      Eosinophils Absolute 0 01 Thousand/µL      Basophils Absolute 0 02 Thousands/µL     Troponin I [[id number]]  (Abnormal) Collected:  07/05/20 0911    Lab Status:  Final result Specimen:  Blood from Arm, Left Updated:  07/05/20 0946     Troponin I 0 09 ng/mL     Comprehensive metabolic panel [[id number]]  (Abnormal) Collected:  07/05/20 0911    Lab Status:  Final result Specimen:  Blood from Arm, Left Updated:  07/05/20 0943     Sodium 139 mmol/L      Potassium 4 2 mmol/L      Chloride 102 mmol/L      CO2 29 mmol/L      ANION GAP 8 mmol/L      BUN 37 mg/dL      Creatinine 2 59 mg/dL      Glucose 74 mg/dL      Calcium 9 5 mg/dL      AST 70 U/L      ALT 54 U/L      Alkaline Phosphatase 129 U/L      Total Protein 6 8 g/dL      Albumin 2 8 g/dL      Total Bilirubin 1 00 mg/dL      eGFR 18 ml/min/1 73sq m     Narrative:       National Kidney Disease Foundation guidelines for Chronic Kidney Disease (CKD):     Stage 1 with normal or high GFR (GFR > 90 mL/min/1 73 square meters)    Stage 2 Mild CKD (GFR = 60-89 mL/min/1 73 square meters)    Stage 3A Moderate CKD (GFR = 45-59 mL/min/1 73 square meters)    Stage 3B Moderate CKD (GFR = 30-44 mL/min/1 73 square meters)    Stage 4 Severe CKD (GFR = 15-29 mL/min/1 73 square meters)    Stage 5 End Stage CKD (GFR <15 mL/min/1 73 square meters)  Note: GFR calculation is accurate only with a steady state creatinine    CK Total with Reflex CKMB [[id number]]  (Abnormal) Collected:  07/05/20 0911    Lab Status:  Final result Specimen:  Blood from Arm, Left Updated:  07/05/20 0943     Total  U/L     POCT urinalysis dipstick [[id number]]     Lab Status:  No result Specimen:  Urine                  XR chest 2 views   ED Interpretation by Bernarda Cutler MD (07/05 1035)   RML infiltrate      Final Result by Jass Islas MD (07/05 8124)      Increased opacity in the right middle lobe with retraction of the minor fissure, at least in part due to atelectasis  Superimposed pneumonia cannot be excluded  Small left pleural effusion and left base atelectasis  Superimposed pneumonia cannot be excluded  Workstation performed: [id number]         CT head without contrast   Final Result by Rocio Crews MD (07/05 7591)      No acute intracranial process  No skull fracture        Chronic microangiopathy and small bifrontal cortical infarcts  Workstation performed: [id number]         CT cervical spine without contrast   Final Result by Norma Mcgraw MD (07/05 4401)      No cervical spine fracture or traumatic malalignment  Mild multilevel cervical degenerative changes most prominent at C4-5 and C5-6  Workstation performed: [id number]               Procedures  Procedures      ED Course  ED Course as of Jul 06 0702   Loni Stevens Jul 05, 2020   0906 SVM: first call to nursing facility and nursing station where pt resides was not picking up, left callback number to [phone]  SVM callback: March Ke from NF at locked dementia unit, pt found on fall and new to unit, unsure of baseline mental status, unsure of downtime, possibly full session dialysis on Friday, wheelchair-dependent, depends was wet this morning when EMS came for her      0956 History of CKD but no previous value   Troponin I(!): 0 09   1001 Total CK(!): 874   1001 CREATINE KINASE-MB FRACTION(!): 293 2   1040 Will admit to SOD for PNA, elevated trop, CK          US AUDIT      Most Recent Value   Initial Alcohol Screen: US AUDIT-C    1  How often do you have a drink containing alcohol?  0 Filed at: 07/05/2020 0825   2  How many drinks containing alcohol do you have on a typical day you are drinking? 0 Filed at: 07/05/2020 0825   3a  Male UNDER 65: How often do you have five or more drinks on one occasion? 0 Filed at: 07/05/2020 0825   3b  FEMALE Any Age, or MALE 65+: How often do you have 4 or more drinks on one occassion? 0 Filed at: 07/05/2020 0825   Audit-C Score  0 Filed at: 07/05/2020 0825              Identification of Seniors at Risk      Most Recent Value   (ISAR) Identification of Seniors at Risk   Before the illness or injury that brought you to the Emergency, did you need someone to help you on a regular basis?   1 Filed at: 07/05/2020 0827   In the last 24 hours, have you needed more help than usual?  0 Filed at: 07/05/2020 0827   Have you been hospitalized for one or more nights during the past 6 months? 1 Filed at: 07/05/2020 0827   In general, do you see well? 1 Filed at: 07/05/2020 0827   In general, do you have serious problems with your memory? 1 Filed at: 07/05/2020 3439   Do you take more than three different medications every day? 1 Filed at: 07/05/2020 0827   ISAR Score  5 Filed at: 07/05/2020 0827          JASMYN/DAST-10      Most Recent Value   How many times in the past year have you    Used an illegal drug or used a prescription medication for non-medical reasons? Never Filed at: 07/05/2020 0825                              MDM  Number of Diagnoses or Management Options  Pneumonia:   Diagnosis management comments: 81yo female with history of dementia, ESRD presents after unwitnessed fall at nursing facility  On exam, patient remembers falling however cannot provide any of historical details, she is moving all her extremities and in no acute distress  CTH, c-spine negative  CXR with apparent right lobe pneumonia and she was initiated on abx  Possible trop bump, however no baseline labs  CK mildly elevated  Patient was admitted to [place] service  Disposition  Final diagnoses:   Pneumonia     Time reflects when diagnosis was documented in both MDM as applicable and the Disposition within this note     Time User Action Codes Description Comment    7/5/2020 12:53 PM Beck Berrios Add [J18 9] Pneumonia     7/5/2020  2:27 PM Alisha Zaman Add [N18 6] ESRD (end stage renal disease) ([facility] [address] )     7/5/2020  2:33 PM Quinton Zaman Add [F03 90] Dementia [facility])       ED Disposition     ED Disposition Condition Date/Time Comment    Admit Stable Sun Jul 5, 2020 12:53 PM Case was discussed with ATIF and the patient's admission status was agreed to be Admission Status: inpatient status to the service of Dr Naresh Aguilar           Follow-up Information    None         Current Discharge Medication List      CONTINUE these medications which have NOT CHANGED    Details   B Complex-C-Folic Acid (HORTENSIA-DAVIAN PO) Take by mouth daily      cholestyramine (QUESTRAN) 4 g packet Take 1 packet by mouth 2 (two) times a day with meals      folic acid (FOLVITE) 1 mg tablet Take by mouth daily      hydroxychloroquine (PLAQUENIL) 200 mg tablet Take 200 mg by mouth daily with breakfast      levETIRAcetam (KEPPRA) 500 mg tablet Take 500 mg by mouth see administration instructions 1 tablet daily  1/2 tablet a day every mon wed fri on dialysis days      lidocaine-prilocaine (EMLA) cream Apply topically see administration instructions Apply to arm 1 hour before dialysis on Monday, Wednesday, Friday      metoprolol tartrate (LOPRESSOR) 50 mg tablet Take 50 mg by mouth every 12 (twelve) hours      Nutritional Supplements (NEPRO/CARBSTEADY PO) Take by mouth      Pancrelipase, Lip-Prot-Amyl, (Zenpep) 77589-70687 units CPEP Take by mouth 3 (three) times a day      pantoprazole (PROTONIX) 40 mg tablet Take 40 mg by mouth daily      traZODone (DESYREL) 50 mg tablet Take 50 mg by mouth daily           No discharge procedures on file  PDMP Review     None           ED Provider  Attending physically available and evaluated Praveena Zarate I managed the patient along with the ED Attending      Electronically Signed by         Ana Adams DO  07/06/20 3070

## 2020-07-05 NOTE — ED ATTENDING ATTESTATION
7/5/2020  I, Mina Martell MD, saw and evaluated the patient  I have discussed the patient with the resident/non-physician practitioner and agree with the resident's/non-physician practitioner's findings, Plan of Care, and MDM as documented in the resident's/non-physician practitioner's note, except where noted  All available labs and Radiology studies were reviewed  I was present for key portions of any procedure(s) performed by the resident/non-physician practitioner and I was immediately available to provide assistance  At this point I agree with the current assessment done in the Emergency Department  I have conducted an independent evaluation of this patient a history and physical is as follows:    ED Course     79-year-old female, history of dementia, unable to provide any reasonable history, presenting to the emergency department for evaluation after fall  Unwitnessed  Complains of head neck pain  Unable to provide full of review of systems secondary to underlying dementia  On examination elderly female lying recumbent on a stretcher no acute distress  Head is normocephalic and atraumatic  Eyelids lashes  Mucous membranes are dry  Neck is in a cervical collar  Chest is clear to auscultation bilaterally with no wheezes rales or rhonchi  Chest wall is nontender to palpation  Heart is irregular  Abdomen is nondistended, soft and nontender  Pelvis stable to palpation  Extremities are unremarkable on external appearance except for a skin tear on the left dorsal hand  Patient is awake and alert  Cranial nerves are grossly intact  Motor is 5/5 bilateral upper extremity, and lower extremities  79-year-old female, history of dementia, presenting for evaluation of fall      Labs Reviewed   CBC AND DIFFERENTIAL - Abnormal       Result Value Ref Range Status    WBC 2 79 (*) 4 31 - 10 16 Thousand/uL Final    RBC 3 45 (*) 3 81 - 5 12 Million/uL Final    Hemoglobin 10 9 (*) 11 5 - 15 4 g/dL Final    Hematocrit 34 4 (*) 34 8 - 46 1 % Final     (*) 82 - 98 fL Final    MCH 31 6  26 8 - 34 3 pg Final    MCHC 31 7  31 4 - 37 4 g/dL Final    RDW 15 7 (*) 11 6 - 15 1 % Final    MPV 9 5  8 9 - 12 7 fL Final    Platelets 77 (*) 076 - 390 Thousands/uL Final    nRBC 0  /100 WBCs Final    Neutrophils Relative 73  43 - 75 % Final    Immat GRANS % 0  0 - 2 % Final    Lymphocytes Relative 19  14 - 44 % Final    Monocytes Relative 7  4 - 12 % Final    Eosinophils Relative 0  0 - 6 % Final    Basophils Relative 1  0 - 1 % Final    Neutrophils Absolute 2 03  1 85 - 7 62 Thousands/µL Final    Immature Grans Absolute 0 01  0 00 - 0 20 Thousand/uL Final    Lymphocytes Absolute 0 53 (*) 0 60 - 4 47 Thousands/µL Final    Monocytes Absolute 0 19  0 17 - 1 22 Thousand/µL Final    Eosinophils Absolute 0 01  0 00 - 0 61 Thousand/µL Final    Basophils Absolute 0 02  0 00 - 0 10 Thousands/µL Final   COMPREHENSIVE METABOLIC PANEL - Abnormal    Sodium 139  136 - 145 mmol/L Final    Potassium 4 2  3 5 - 5 3 mmol/L Final    Chloride 102  100 - 108 mmol/L Final    CO2 29  21 - 32 mmol/L Final    ANION GAP 8  4 - 13 mmol/L Final    BUN 37 (*) 5 - 25 mg/dL Final    Creatinine 2 59 (*) 0 60 - 1 30 mg/dL Final    Comment: Standardized to IDMS reference method    Glucose 74  65 - 140 mg/dL Final    Comment:   If the patient is fasting, the ADA then defines impaired fasting glucose as > 100 mg/dL and diabetes as > or equal to 123 mg/dL  Specimen collection should occur prior to Sulfasalazine administration due to the potential for falsely depressed results  Specimen collection should occur prior to Sulfapyridine administration due to the potential for falsely elevated results  Calcium 9 5  8 3 - 10 1 mg/dL Final    AST 70 (*) 5 - 45 U/L Final    Comment:   Specimen collection should occur prior to Sulfasalazine administration due to the potential for falsely depressed results       ALT 54  12 - 78 U/L Final    Comment: Specimen collection should occur prior to Sulfasalazine and/or Sulfapyridine administration due to the potential for falsely depressed results  Alkaline Phosphatase 129 (*) 46 - 116 U/L Final    Total Protein 6 8  6 4 - 8 2 g/dL Final    Albumin 2 8 (*) 3 5 - 5 0 g/dL Final    Total Bilirubin 1 00  0 20 - 1 00 mg/dL Final    Comment:   Use of this assay is not recommended for patients undergoing treatment with eltrombopag due to the potential for falsely elevated results  eGFR 18  ml/min/1 73sq m Final    Narrative:     [place] guidelines for Chronic Kidney Disease (CKD):     Stage 1 with normal or high GFR (GFR > 90 mL/min/1 73 square meters)    Stage 2 Mild CKD (GFR = 60-89 mL/min/1 73 square meters)    Stage 3A Moderate CKD (GFR = 45-59 mL/min/1 73 square meters)    Stage 3B Moderate CKD (GFR = 30-44 mL/min/1 73 square meters)    Stage 4 Severe CKD (GFR = 15-29 mL/min/1 73 square meters)    Stage 5 End Stage CKD (GFR <15 mL/min/1 73 square meters)  Note: GFR calculation is accurate only with a steady state creatinine   CK - Abnormal    Total  (*) 26 - 192 U/L Final   TROPONIN I - Abnormal    Troponin I 0 09 (*) <=0 04 ng/mL Final    Comment:   [employer] Chemistry analyzer 99% cutoff is > 0 04 ng/mL in network labs     o cTnI 99% cutoff is useful only when applied to patients in the clinical setting of myocardial ischemia   o cTnI 99% cutoff should be interpreted in the context of clinical history, ECG findings and possibly cardiac imaging to establish correct diagnosis  o cTnI 99% cutoff may be suggestive but clearly not indicative of a coronary event without the clinical setting of myocardial ischemia      Results indicate test should be repeated on new specimen collected within 4-6 hours of the original   CKMB - Abnormal    CK-MB Index 33 5 (*) 0 0 - 2 5 % Final    CK- 2 (*) 0 0 - 5 0 ng/mL Final   LACTIC ACID, PLASMA - Normal    LACTIC ACID 1 5  0 5 - 2 0 mmol/L Final    Narrative:     Result may be elevated if tourniquet was used during collection  BLOOD CULTURE   BLOOD CULTURE   NOVEL CORONAVIRUS (COVID-19), PCR [facility]   POCT URINALYSIS DIPSTICK       XR chest 2 views   ED Interpretation   RML infiltrate      CT head without contrast   Final Result      No acute intracranial process  No skull fracture  Chronic microangiopathy and small bifrontal cortical infarcts  Workstation performed: [id number]         CT cervical spine without contrast   Final Result      No cervical spine fracture or traumatic malalignment  Mild multilevel cervical degenerative changes most prominent at C4-5 and C5-6               Workstation performed: [id number]                 Critical Care Time  Procedures

## 2020-07-06 ENCOUNTER — APPOINTMENT (INPATIENT)
Dept: NON INVASIVE DIAGNOSTICS | Facility: HOSPITAL | Age: 75
DRG: 557 | End: 2020-07-06
Payer: MEDICARE

## 2020-07-06 ENCOUNTER — APPOINTMENT (INPATIENT)
Dept: DIALYSIS | Facility: HOSPITAL | Age: 75
DRG: 557 | End: 2020-07-06
Payer: MEDICARE

## 2020-07-06 PROBLEM — D69.6 THROMBOCYTOPENIA (HCC): Status: ACTIVE | Noted: 2020-07-06

## 2020-07-06 LAB
ANION GAP SERPL CALCULATED.3IONS-SCNC: 11 MMOL/L (ref 4–13)
ATRIAL RATE: 72 BPM
BUN SERPL-MCNC: 40 MG/DL (ref 5–25)
C DIFF TOX GENS STL QL NAA+PROBE: NEGATIVE
CALCIUM SERPL-MCNC: 9.4 MG/DL (ref 8.3–10.1)
CHLORIDE SERPL-SCNC: 102 MMOL/L (ref 100–108)
CK MB SERPL-MCNC: 232.4 NG/ML (ref 0–5)
CK MB SERPL-MCNC: 39.9 % (ref 0–2.5)
CK SERPL-CCNC: 583 U/L (ref 26–192)
CO2 SERPL-SCNC: 24 MMOL/L (ref 21–32)
CREAT SERPL-MCNC: 2.55 MG/DL (ref 0.6–1.3)
ERYTHROCYTE [DISTWIDTH] IN BLOOD BY AUTOMATED COUNT: 16 % (ref 11.6–15.1)
GFR SERPL CREATININE-BSD FRML MDRD: 18 ML/MIN/1.73SQ M
GLUCOSE SERPL-MCNC: 46 MG/DL (ref 65–140)
HCT VFR BLD AUTO: 39.2 % (ref 34.8–46.1)
HGB BLD-MCNC: 12.3 G/DL (ref 11.5–15.4)
MAGNESIUM SERPL-MCNC: 2.2 MG/DL (ref 1.6–2.6)
MCH RBC QN AUTO: 31.4 PG (ref 26.8–34.3)
MCHC RBC AUTO-ENTMCNC: 31.4 G/DL (ref 31.4–37.4)
MCV RBC AUTO: 100 FL (ref 82–98)
P AXIS: 9 DEGREES
PLATELET # BLD AUTO: 88 THOUSANDS/UL (ref 149–390)
PMV BLD AUTO: 10 FL (ref 8.9–12.7)
POTASSIUM SERPL-SCNC: 4.2 MMOL/L (ref 3.5–5.3)
PR INTERVAL: 148 MS
PROCALCITONIN SERPL-MCNC: 0.32 NG/ML
QRS AXIS: -45 DEGREES
QRSD INTERVAL: 90 MS
QT INTERVAL: 432 MS
QTC INTERVAL: 473 MS
RBC # BLD AUTO: 3.92 MILLION/UL (ref 3.81–5.12)
SODIUM SERPL-SCNC: 137 MMOL/L (ref 136–145)
T WAVE AXIS: 37 DEGREES
VENTRICULAR RATE: 72 BPM
WBC # BLD AUTO: 3.2 THOUSAND/UL (ref 4.31–10.16)

## 2020-07-06 PROCEDURE — 5A1D70Z PERFORMANCE OF URINARY FILTRATION, INTERMITTENT, LESS THAN 6 HOURS PER DAY: ICD-10-PCS | Performed by: INTERNAL MEDICINE

## 2020-07-06 PROCEDURE — 80048 BASIC METABOLIC PNL TOTAL CA: CPT | Performed by: PHYSICIAN ASSISTANT

## 2020-07-06 PROCEDURE — 84145 PROCALCITONIN (PCT): CPT | Performed by: PHYSICIAN ASSISTANT

## 2020-07-06 PROCEDURE — 97166 OT EVAL MOD COMPLEX 45 MIN: CPT

## 2020-07-06 PROCEDURE — 99232 SBSQ HOSP IP/OBS MODERATE 35: CPT | Performed by: INTERNAL MEDICINE

## 2020-07-06 PROCEDURE — 93005 ELECTROCARDIOGRAM TRACING: CPT

## 2020-07-06 PROCEDURE — 97163 PT EVAL HIGH COMPLEX 45 MIN: CPT

## 2020-07-06 PROCEDURE — 93010 ELECTROCARDIOGRAM REPORT: CPT | Performed by: INTERNAL MEDICINE

## 2020-07-06 PROCEDURE — 85027 COMPLETE CBC AUTOMATED: CPT | Performed by: PHYSICIAN ASSISTANT

## 2020-07-06 PROCEDURE — 99233 SBSQ HOSP IP/OBS HIGH 50: CPT | Performed by: INTERNAL MEDICINE

## 2020-07-06 PROCEDURE — 99223 1ST HOSP IP/OBS HIGH 75: CPT | Performed by: INTERNAL MEDICINE

## 2020-07-06 PROCEDURE — 83735 ASSAY OF MAGNESIUM: CPT | Performed by: GENERAL PRACTICE

## 2020-07-06 PROCEDURE — 82553 CREATINE MB FRACTION: CPT | Performed by: PHYSICIAN ASSISTANT

## 2020-07-06 PROCEDURE — 82550 ASSAY OF CK (CPK): CPT | Performed by: PHYSICIAN ASSISTANT

## 2020-07-06 RX ORDER — LIDOCAINE 50 MG/G
1 PATCH TOPICAL DAILY
Status: DISCONTINUED | OUTPATIENT
Start: 2020-07-06 | End: 2020-07-14 | Stop reason: HOSPADM

## 2020-07-06 RX ORDER — AMLODIPINE BESYLATE 2.5 MG/1
2.5 TABLET ORAL DAILY
Status: DISCONTINUED | OUTPATIENT
Start: 2020-07-07 | End: 2020-07-08

## 2020-07-06 RX ADMIN — METOPROLOL TARTRATE 5 MG: 5 INJECTION INTRAVENOUS at 17:12

## 2020-07-06 RX ADMIN — METOPROLOL TARTRATE 5 MG: 5 INJECTION INTRAVENOUS at 22:54

## 2020-07-06 RX ADMIN — HEPARIN SODIUM 5000 UNITS: 5000 INJECTION INTRAVENOUS; SUBCUTANEOUS at 05:55

## 2020-07-06 RX ADMIN — METOPROLOL TARTRATE 5 MG: 5 INJECTION INTRAVENOUS at 04:19

## 2020-07-06 RX ADMIN — LEVETIRACETAM 750 MG: 100 INJECTION, SOLUTION INTRAVENOUS at 09:35

## 2020-07-06 RX ADMIN — LIDOCAINE 1 PATCH: 50 PATCH TOPICAL at 12:12

## 2020-07-06 RX ADMIN — HEPARIN SODIUM 5000 UNITS: 5000 INJECTION INTRAVENOUS; SUBCUTANEOUS at 22:53

## 2020-07-06 RX ADMIN — TRAZODONE HYDROCHLORIDE 50 MG: 50 TABLET ORAL at 23:20

## 2020-07-06 RX ADMIN — HEPARIN SODIUM 5000 UNITS: 5000 INJECTION INTRAVENOUS; SUBCUTANEOUS at 12:12

## 2020-07-06 RX ADMIN — METOPROLOL TARTRATE 5 MG: 5 INJECTION INTRAVENOUS at 09:35

## 2020-07-06 NOTE — PHYSICAL THERAPY NOTE
Physical Therapy Evaluation     Patient's Name: Gonzalo Rosenberg    Admitting Diagnosis  Pneumonia [J18 9]  Other injury of unspecified body region, initial encounter Benny Cavanaugh  8XXA]    Problem List  Patient Active Problem List   Diagnosis    ESRD (end stage renal disease) ([place] [address] )    Ambulatory dysfunction    Dementia ([place] [address] )    Hypertensive urgency    Elevated troponin    Elevated CPK    Seizure ([place] [address] )    CAD (coronary artery disease)    History of CVA (cerebrovascular accident)    RA (rheumatoid arthritis) ([place] [address] )    Thrombocytopenia ([place] [address] )       Past Medical History  Past Medical History:   Diagnosis Date    Dialysis patient ([place] [address] )     Epilepsy ([place] [address] )     Kidney disease     Seizure ([place] [address] )        Past Surgical History  History reviewed  No pertinent surgical history  07/06/20 1007   Note Type   Note type Eval only   Pain Assessment   Pain Assessment Tool FLACC   Pain Rating: FLACC (Rest) - Face 0   Pain Rating: FLACC (Rest) - Legs 0   Pain Rating: FLACC (Rest) - Activity 0   Pain Rating: FLACC (Rest) - Cry 0   Pain Rating: FLACC (Rest) - Consolability 0   Score: FLACC (Rest) 0   Pain Rating: FLACC (Activity) - Face 0   Pain Rating: FLACC (Activity) - Legs 0   Pain Rating: FLACC (Activity) - Activity 0   Pain Rating: FLACC (Activity) - Cry 0   Pain Rating: FLACC (Activity) - Consolability 0   Score: FLACC (Activity) 0   Home Living   Type of Home   ([facility] dementia unit)   Additional Comments Pt poor historian 2' cognitive deficits   Per EMR, pt admitted from [facility] dementia unit   Prior Function   Level of [place] Needs assistance with ADLs and functional mobility   Lives With Facility staff   Receives Help From Personal care attendant   ADL Assistance Needs assistance   IADLs Needs assistance   Falls in the last 6 months   (unknown, at least 1 leading to this admission)   Restrictions/Precautions   Weight Bearing Precautions Per Order No   Other Precautions Contact/isolation;Cognitive;1:1;Restraints;Multiple lines; Fall Risk  (mitt restraints)   General   Family/Caregiver Present No   Cognition   Overall Cognitive Status Impaired   Arousal/Participation Alert   Attention Difficulty attending to directions   Orientation Level Unable to assess   Memory Unable to assess   Following Commands Follows one step commands inconsistently   Comments Pt not responding to any of therapists questions   RLE Assessment   RLE Assessment   (at least 3/5)   LLE Assessment   LLE Assessment   (at least 3/5)   Coordination   Movements are Fluid and Coordinated 0   Bed Mobility   Supine to Sit 3  Moderate assistance   Additional items Assist x 2; Increased time required;Verbal cues   Sit to Supine 3  Moderate assistance   Additional items Assist x 2; Increased time required;Verbal cues   Additional Comments Pt able to initiate sitting EOB and then became resistive to therapists assistance and was returned supine   Transfers   Sit to Stand Unable to assess   Additional Comments STS was attempted x2 however pt continueally raising LEs off the ground and unable to follow commands for appropriate LE posititioning; OOB transfers not safe to attempt at this time   Ambulation/Elevation   Gait pattern Not appropriate   Balance   Static Sitting Poor   Endurance Deficit   Endurance Deficit Yes   Endurance Deficit Description cog, weakness   Activity Tolerance   Activity Tolerance Patient limited by fatigue;Treatment limited secondary to medical complications (Comment)  (cog)   Medical Staff Made Aware OT, CM   Nurse Made Aware yes   Assessment   Prognosis Fair   Problem List Decreased strength;Decreased endurance; Impaired balance;Decreased mobility; Decreased cognition; Impaired judgement;Decreased safety awareness   Assessment Pt is 70 y o  female seen for PT evaluation s/p admit to [facility] on 7/5/2020 w/ Ambulatory dysfunction  Pt admitted from [facility] dementia unit s/p fall  CT demonstrates no acute abnormality   PT consulted to assess pt's functional mobility and d/c needs  Order placed for PT eval and tx, w/ up w/ A order  Comorbidities affecting pt's physical performance at time of assessment include: ambulatory dysfunction, ESRD, dementia, HN, seizure, hx of CVA, RA  Pt poor historian 2' cognitive deficits  Per EMR, pt admitted from [facility] dementia unit  Personal factors affecting pt at time of IE include: inability to ambulate household distances, inability to navigate community distances, unable to perform dynamic tasks in community, decreased cognition, positive fall history, decreased initiation and engagement, limited insight into impairments, inability to perform IADLs and inability to perform ADLs  Please find objective findings from PT assessment regarding body systems outlined above with impairments and limitations including weakness, impaired balance, decreased endurance, gait deviations, pain, decreased activity tolerance, decreased functional mobility tolerance, decreased safety awareness, impaired judgement, fall risk and decreased cognition  Pt performed bed mobility at Mod Ax2; pt able to initiate sitting EOB then became resistive to therapist assistance  Attempted STS x2 however pt continually lifting LEs off ground and unable to follow commands for appropriate LE position; OOB transfers not safe to assess at this time  The following objective measures performed on IE also reveal limitations: Modified Wing: 5 (severe disability)  Pt's clinical presentation is currently unstable/unpredictable seen in pt's presentation of recent admission for ambulatory dysfunction requiring medical attention, recent decline in function as compared to baseline, multiple lines, fall risk, cognitive deficits  Pt appears to be functioning at baseline level with functional mobility; therefore, requires no immediate acute skilled PT services at this time  Will D/C PT at this time  Please re-consult if pt's medical status changes   From PT/mobility standpoint, recommendation at time of d/c would be return to [facility] dementia unit if able to provide appropriate level of support  Goals   Patient Goals none stated   STG Expiration Date 07/20/20   PT Treatment Day 0   Plan   Treatment/Interventions Functional transfer training;LE strengthening/ROM; Therapeutic exercise; Endurance training;Patient/family training;Equipment eval/education; Bed mobility;Gait training;Spoke to nursing   PT Frequency   (3-5x/week)   Recommendation   PT Discharge Recommendation Return to previous environment with social support  (return to [facility] dementia unit)   Equipment Recommended   (TBD)   PT - OK to Discharge Yes  (if to [facility] dementia unit when medically cleared)   Modified Melinda Scale   Modified Mora Scale 5     Jez Subha, PT, DPT

## 2020-07-06 NOTE — PLAN OF CARE
Problem: Nutrition/Hydration-ADULT  Goal: Nutrient/Hydration intake appropriate for improving, restoring or maintaining nutritional needs  Description  Monitor and assess patient's nutrition/hydration status for malnutrition  Collaborate with interdisciplinary team and initiate plan and interventions as ordered  Monitor patient's weight and dietary intake as ordered or per policy  Utilize nutrition screening tool and intervene as necessary  Determine patient's food preferences and provide high-protein, high-caloric foods as appropriate       INTERVENTIONS:  - Monitor oral intake, urinary output, labs, and treatment plans  - Assess nutrition and hydration status and recommend course of action  - Evaluate amount of meals eaten  - Assist patient with eating if necessary   - Allow adequate time for meals  - Recommend/ encourage appropriate diets, oral nutritional supplements, and vitamin/mineral supplements  - Order, calculate, and assess calorie counts as needed  - Recommend, monitor, and adjust tube feedings and TPN/PPN based on assessed needs  - Assess need for intravenous fluids  - Provide specific nutrition/hydration education as appropriate  - Include patient/family/caregiver in decisions related to nutrition  Outcome: Not Progressing   Minimal po intake at this time; nutrition supplement scheduled

## 2020-07-06 NOTE — OCCUPATIONAL THERAPY NOTE
Occupational Therapy Evaluation      Everett Gary    7/6/2020    Principal Problem:    Ambulatory dysfunction  Active Problems:    ESRD (end stage renal disease) ([facility] )    Dementia ([facility] )    Hypertensive urgency    Elevated troponin    Elevated CPK    Seizure ([facility])    CAD (coronary artery disease)    History of CVA (cerebrovascular accident)    RA (rheumatoid arthritis) ([facility] )    Thrombocytopenia ([facility] )      Past Medical History:   Diagnosis Date    Dialysis patient ([place] [address] )     Epilepsy ([place] [address] )     Kidney disease     Seizure ([place] [address] )        History reviewed  No pertinent surgical history  07/06/20 1002   Note Type   Note type Eval only   Restrictions/Precautions   Other Precautions Contact/isolation; Impulsive;1:1;Cognitive; Bed Alarm; Restraints;Multiple lines  (mittens)   Pain Assessment   Pain Assessment Tool FLACC   Pain Rating: FLACC (Rest) - Face 0   Pain Rating: FLACC (Rest) - Legs 0   Pain Rating: FLACC (Rest) - Activity 0   Pain Rating: FLACC (Rest) - Cry 0   Pain Rating: FLACC (Rest) - Consolability 0   Score: FLACC (Rest) 0   Pain Rating: FLACC (Activity) - Face 0   Pain Rating: FLACC (Activity) - Legs 0   Pain Rating: FLACC (Activity) - Activity 0   Pain Rating: FLACC (Activity) - Cry 0   Pain Rating: FLACC (Activity) - Consolability 0   Score: FLACC (Activity) 0   Home Living   Type of Home Assisted living  ([address])   [facility]   Additional Comments per chart, pt moved into [facility] due to live in aide at home retiring and pt needing increased assist   Prior Function   Level of [place] Needs assistance with ADLs and functional mobility   Lives With Facility staff   Receives Help From Personal care attendant   ADL Assistance Needs assistance   IADLs Needs assistance   Falls in the last 6 months   (uncertain   Pt reportedly slides self OOB to floor)   Lifestyle   Autonomy per chart, pt dependent for self care and is mostly w/c and bedbound as of late   Psychosocial Patient Behaviors/Mood Uncooperative   ADL   Eating Assistance   (needs to be fed by staff)   Grooming Assistance 1  Total Assistance   [address] 1  Total Assistance   LB Bathing Assistance 1  Total Assistance   [address] 1  Total Assistance   LB Dressing Assistance 1  Total [address] [place] [address]  1  Total Assistance   Additional Comments pt requires total care for all self care due to decreased cognitive function   Bed Mobility   Supine to Sit 3  Moderate assistance   Additional items Assist x 2   Sit to Supine 3  Moderate assistance   Additional items Assist x 2   Additional Comments pt resistive w activity  Transfers   Sit to Stand Unable to assess   Additional Comments attempted sit to stand w 2 people, however, pt fulled feet up from under her  unable to complete sit to stand   Balance   Static Sitting Fair -   Activity Tolerance   Activity Tolerance Patient tolerated treatment well   Nurse Made Aware ok to see per RN  Has 1:1 in room   RUE Assessment   RUE Assessment   (PROM WFL, unable to assess AROM)   LUE Assessment   LUE Assessment   (Unable to assess AROM  PROM WFL)   Cognition   Overall Cognitive Status Impaired   Arousal/Participation Responsive   Attention Difficulty attending to directions   Orientation Level Unable to assess   Memory Unable to assess   Following Commands Follows one step commands inconsistently   Comments pt inconsistently follows 1 step simple commands  Assessment   Limitation Decreased ADL status; Decreased Safe judgement during ADL;Decreased cognition;Decreased endurance;Decreased self-care trans;Decreased high-level ADLs   Assessment Pt is a 70 y o  female seen for OT evaluation s/p admit to [facility] on 7/5/2020 w/ Ambulatory dysfunction  Pt s/p unwitnessed fall  She recently moved into the dementia unit at [facility]  Per report, pt is dependent for care and mostly w/c or bedbound    Comorbidities affecting pt's functional performance at time of assessment include: amb dysfunction, ESRD on HD, dementia, seizure d/o, CAD, CVA, RA  Personal factors affecting pt at time of IE include:difficulty performing ADLS, difficulty performing IADLS , limited insight into deficits and decreased initiation and engagement   Upon evaluation: Pt demonstrates difficulty attending to directions, difficulty following 1 step commands  She is impulsive and restless, has 1:1 sitter in room as well as mittens on both hands  At this time, she needs total assist w self care, assist of 2 for safe bed mobility  Pt unable to complete sit to stand  She does have limited insight, poor comprehension, poor probl solving due to advanced dementia  At this time, she is not a good candidate for skilled OT treatment  Ongoing OT needs not identified and she will be dc'd from OT at this time  Pt scored   10/100 on the barthel index  Based on findings from OT evaluation and functional performance deficits, pt has been identified as a  Moderate complexity evaluation  From OT standpoint, recommendation at time of d/c would be back to [facility] where she'll continue to need 24/7 care and support      Plan   OT Frequency Eval only   Recommendation   OT Discharge Recommendation Other (Comment)   OT - OK to Discharge Yes   Additional Comments  return to [facility] or other facility for ongoing 24/7 care and support   Barthel Index   Feeding 5   Bathing 0   Grooming Score 0   Dressing Score 0   Bladder Score 0   Bowels Score 0   Toilet Use Score 0   Transfers (Bed/Chair) Score 5   Mobility (Level Surface) Score 0   Stairs Score 0   Barthel Index Score 10

## 2020-07-06 NOTE — PROGRESS NOTES
Progress Note - Tish Green 1945, 70 y o  female MRN: [id number]    [facility] Encounter: [id number]    Primary Care Provider: No primary care provider on file  Date and time admitted to hospital: 7/5/2020  8:22 AM        * Ambulatory dysfunction  Assessment & Plan  New resident at [facility], reportedly had unwitnessed fall  out of bed   Trauma imaging negative for acute fx  CXR shows possible infiltrate but pt asx without fever, monitor off abx   Doubt pneumonia  PT/OT consults prior to return to dementia unit   Pt has dementia, unknown if there was true LOC   Consider possibility of breakthrough seizure - monitor closely   Fall precautions     Hypertensive urgency  Assessment & Plan  Resume lopressor 50mg BID  Norvasc was added  HD Monday Wednesday Friday  Nephrology is following       Thrombocytopenia ([facility] )  Assessment & Plan  Monitor    Seizure [facility])  Assessment & Plan  Hx of, consider possibility of breakthrough sz   Continue keppra   sz precautions     Elevated CPK  Assessment & Plan  Likely secondary to repeated fall  HD today  Monitor    Elevated troponin  Assessment & Plan  Consider possibility of non mi troponin elevation  in the setting of fall, elevated blood pressure ,possible seizure, mild rhabdo, underlying ESRD   EKG NSR with PACs  Follow on cardiac echo  Monitor    Dementia [facility])  Assessment & Plan  Continue trazodone at bedtime   Moved in dementia unit [facility] on Friday 7/3  Delirium precautions  Geriatric consulted for further management     ESRD (end stage renal disease) ([facility] )  Assessment & Plan  HD MWF   C/s nephrology for management       VTE Pharmacologic Prophylaxis:   Pharmacologic: Heparin  Mechanical VTE Prophylaxis in Place: Yes    Patient Centered Rounds: I have performed bedside rounds with nursing staff today      Discussions with Specialists or Other Care Team Provider:     Education and Discussions with Family / Patient:  Discussed with patient's caregiver at [facility]    Time Spent for Care: 34 minutes  More than 50% of total time spent on counseling and coordination of care as described above  Current Length of Stay: 1 day(s)    Current Patient Status: Inpatient   Certification Statement: The patient will continue to require additional inpatient hospital stay due to Above    Discharge Plan / Estimated Discharge Date: TBD    Code Status: Level 3 - DNAR and DNI      Subjective:   Patient seen and examined  Comfortable in bed  Refusing to take oral meds    Objective:     Vitals:   Temp (24hrs), Av 7 °F (36 5 °C), Min:97 2 °F (36 2 °C), Max:98 4 °F (36 9 °C)    Temp:  [97 2 °F (36 2 °C)-98 4 °F (36 9 °C)] 97 2 °F (36 2 °C)  HR:  [71-98] 78  Resp:  [16-18] 16  BP: (141-182)/() 178/81  SpO2:  [95 %-98 %] 95 %  There is no height or weight on file to calculate BMI  Input and Output Summary (last 24 hours): Intake/Output Summary (Last 24 hours) at 2020 1017  Last data filed at 2020 0900  Gross per 24 hour   Intake 253 33 ml   Output 446 ml   Net -192 67 ml       Physical Exam:     Physical Exam  Patient is awake in no acute distress  Thin lady  Baseline dementia, pleasantly confused  Lung clear to auscultation bilateral  Heart positive S1-S2 no murmur  Abdomen soft nontender positive bowel sounds  Lower extremities no edema    Additional Data:     Labs:    Results from last 7 days   Lab Units 20  0603 20  0911   WBC Thousand/uL 3 20* 2 79*   HEMOGLOBIN g/dL 12 3 10 9*   HEMATOCRIT % 39 2 34 4*   PLATELETS Thousands/uL 88* 77*   NEUTROS PCT %  --  73   LYMPHS PCT %  --  19   MONOS PCT %  --  7   EOS PCT %  --  0     Results from last 7 days   Lab Units 20  0603 20  0911   POTASSIUM mmol/L 4 2 4 2   CHLORIDE mmol/L 102 102   CO2 mmol/L 24 29   BUN mg/dL 40* 37*   CREATININE mg/dL 2 55* 2 59*   CALCIUM mg/dL 9 4 9 5   ALK PHOS U/L  --  129*   ALT U/L  --  54   AST U/L  --  70*           * I Have Reviewed All Lab Data Listed Above    * Additional Pertinent Lab Tests Reviewed: Codie [facility] Admission Reviewed    Imaging:    Imaging Reports Reviewed Today Include:   Imaging Personally Reviewed by Myself Includes:      Recent Cultures (last 7 days):     Results from last 7 days   Lab Units 07/05/20  1940 07/05/20  1111 07/05/20  1109   BLOOD CULTURE   --  Received in Microbiology Lab  Culture in Progress  Received in Microbiology Lab  Culture in Progress  C DIFF TOXIN B  Negative  --   --        Last 24 Hours Medication List:     Current Facility-Administered Medications:  acetaminophen 650 mg Oral Q6H PRN Deng Zaman PA-C    amLODIPine 2 5 mg Oral Daily DO diana Tam complex-vitamin C-folic acid 1 capsule Oral Daily With Dinner Deng Zaman PA-C    calcium carbonate 1,000 mg Oral Daily PRN Deng Zaman PA-C    cholestyramine sugar free 4 g Oral BID Deng Zaman PA-C    folic acid 1 mg Oral Daily Deng Zaman PA-C    heparin (porcine) 5,000 Units Subcutaneous Q8H [address] Deng Zaman PA-C    hydroxychloroquine 200 mg Oral Daily With Breakfast Deng Zaman PA-C    [START ON 7/7/2020] levETIRAcetam 500 mg Intravenous Once per day on Sun Tue Thu Sat Deng Zaman PA-C    levETIRAcetam 750 mg Intravenous Once per day on Mon Wed Fri Matthew Zaman PA-C Last Rate: 750 mg (07/06/20 0935)   metoprolol 5 mg Intravenous Q6H Deng Zaman PA-C    ondansetron 4 mg Intravenous Q6H PRN Deng Zaman PA-C    pantoprazole 40 mg Oral Daily Deng Zaman PA-C    senna 1 tablet Oral Daily Deng Zaman PA-C    traZODone 50 mg Oral HS Matthew Zaman PA-C         Today, Patient Was Seen By: Kaleb Lee DO    ** Please Note: This note has been constructed using a voice recognition system   **

## 2020-07-06 NOTE — SOCIAL WORK
PT from [facility]  Called & s/w staff whom confirmed pt is from secured dementia unit & just moved in on Friday  States pt needs help with all adl's  They provide meals & meds  Pt goes to [address] [facility] HD unit MWF & [facility] transports  Dme w/c  [employer] emrgency contact, [address]  Called [place] & she is pt's partner  Informed pt's 2 daughter's Freida Hamilton (unsure ph#) Yaquelin Larson [phone] have poa  TC to Naresh Hester to confirm plan is for pt to return to [facility]  Left   CM to call [facility] [phone] at [facility] to check if pt can return when medically cleared  Pt on 1:1 & mitts  CM to follow  CM reviewed d/c planning process including the following: identifying help at home, patient preference for d/c planning needs, Discharge Lounge, [facility] Meds to Bed program, availability of treatment team to discuss questions or concerns patient and/or family may have regarding understanding medications and recognizing signs and symptoms once discharged  CM also encouraged patient to follow up with all recommended appointments after discharge  Patient advised of importance for patient and family to participate in managing patients medical well being

## 2020-07-06 NOTE — PROGRESS NOTES
[address] [facility] NOTE   Lola Walker 70 y o  female MRN: [id number]  Unit/Bed#: [facility] 913-01 Encounter: [id number]  Reason for Consult: ESRD    ASSESSMENT and PLAN:    69 yo female with PMHx of ESRD, HTN, dementia who presents with fall from SNF facility  Nephrology on board for ESRD    1) ESRD    - access is [facility]  - HD planned for today 7/6  Will lower time to 3 hours due to logistics for HD unit  - trend CPK - per Primary team  - dose keppra after dialysis on dialysis days - today pt already received keppra, but we will be shortening HD treatment today as noted above and is higher dose on dialysis days  So this can start wed     2) fall    - CT head unrevealing of acute process  - CT spine without acute fracture  Degenerative changes  - CXR with increased opacity - being monitored by Primary team    3) dementia    - per Primary team    4) electrolytes - stable    5) acid/base - stable    6) MBD - monitor PTH as outpatient    7) HTN    - on amlodipine    8) Anemia    - no RUFUS due to CVA chronic noted on CT head    9) thrombocytopenia - unclear etiology or chronicity - per Primary team    SUBJECTIVE / INTERVAL HISTORY:  Pt confused       OBJECTIVE:  Current Weight: Weight - Scale: 54 4 kg (119 lb 14 9 oz)  Vitals:    07/06/20 0000 07/06/20 0412 07/06/20 0648 07/06/20 0800   BP:  (!) 179/75 (!) 178/81    BP Location:       Pulse: 81 71 78    Resp:       Temp:   (!) 97 2 °F (36 2 °C)    TempSrc:       SpO2:  96% 97% 95%   Weight:           Intake/Output Summary (Last 24 hours) at 7/6/2020 1041  Last data filed at 7/6/2020 0900  Gross per 24 hour   Intake 253 33 ml   Output 446 ml   Net -192 67 ml     General: NAD, cachectic   Skin: no rash  Eyes: anicteric sclera  ENT: dry mucous membrane  Neck: supple  Chest: CTA b/l, no ronchii, no wheeze, no rubs, no rales  CVS: s1s2, no murmur, no gallop, no rub  Abdomen: soft, nontender, nl sounds  Extremities: no edema LE b/l  : no jerome  Neuro: AAOX1-2  Psych: cannot assess      Medications:    Current Facility-Administered Medications:     acetaminophen (TYLENOL) tablet 650 mg, 650 mg, Oral, Q6H PRN, Deng Zaman PA-C    amLODIPine (NORVASC) tablet 2 5 mg, 2 5 mg, Oral, Daily, DO Lyla Galan-vitamin C-folic acid (NEPHROCAPS) capsule 1 capsule, 1 capsule, Oral, Daily With Jolly Vega PA-C, 1 capsule at 07/05/20 1518    calcium carbonate (TUMS) chewable tablet 1,000 mg, 1,000 mg, Oral, Daily PRN, Melissa Zaman PA-C    cholestyramine sugar free (QUESTRAN LIGHT) packet 4 g, 4 g, Oral, BID, Deng Zaman PA-C    folic acid (FOLVITE) tablet 1 mg, 1 mg, Oral, Daily, Deng Zaman PA-C, 1 mg at 07/05/20 1518    heparin (porcine) subcutaneous injection 5,000 Units, 5,000 Units, Subcutaneous, Q8H [address], 5,000 Units at 07/06/20 0555 **AND** [CANCELED] Platelet count, , , Once, Deng Zaman PA-C    hydroxychloroquine (PLAQUENIL) tablet 200 mg, 200 mg, Oral, Daily With Breakfast, Deng Zaman PA-C    [START ON 7/7/2020] levETIRAcetam (KEPPRA) 500 mg in sodium chloride 0 9 % 100 mL IVPB, 500 mg, Intravenous, Once per day on Sun Tue Thu Sat, Deng Zaman PA-C    levETIRAcetam (KEPPRA) 750 mg in sodium chloride 0 9 % 100 mL IVPB, 750 mg, Intravenous, Once per day on Mon Wed Fri, Deng Zaman PA-C, Last Rate: 400 mL/hr at 07/06/20 0935, 750 mg at 07/06/20 0935    metoprolol (LOPRESSOR) injection 5 mg, 5 mg, Intravenous, Q6H, Deng Zaman PA-C, 5 mg at 07/06/20 0935    ondansetron (ZOFRAN) injection 4 mg, 4 mg, Intravenous, Q6H PRN, Deng Zaman PA-C    pantoprazole (PROTONIX) EC tablet 40 mg, 40 mg, Oral, Daily, Deng Zaman PA-C, 40 mg at 07/05/20 1518    senna (SENOKOT) tablet 8 6 mg, 1 tablet, Oral, Daily, Deng Zaman PA-C, 8 6 mg at 07/05/20 1517    traZODone (DESYREL) tablet 50 mg, 50 mg, Oral, HS, Leopold Carlos Zaman PA-C    Laboratory Results:  Results from last 7 days   Lab Units 07/06/20  0603 07/05/20  5631 WBC Thousand/uL 3 20* 2 79*   HEMOGLOBIN g/dL 12 3 10 9*   HEMATOCRIT % 39 2 34 4*   PLATELETS Thousands/uL 88* 77*   POTASSIUM mmol/L 4 2 4 2   CHLORIDE mmol/L 102 102   CO2 mmol/L 24 29   BUN mg/dL 40* 37*   CREATININE mg/dL 2 55* 2 59*   CALCIUM mg/dL 9 4 9 5   MAGNESIUM mg/dL 2 2  --

## 2020-07-06 NOTE — ASSESSMENT & PLAN NOTE
New resident at [facility], reportedly had unwitnessed fall  out of bed   Trauma imaging negative for acute fx  CXR shows possible infiltrate but pt asx without fever, monitor off abx   PT/OT consults prior to return to dementia unit   Pt has dementia, unknown if there was true LOC   Consider possibility of breakthrough seizure - monitor closely   Fall precautions

## 2020-07-06 NOTE — ASSESSMENT & PLAN NOTE
Consider possibility of non mi troponin elevation  in the setting of fall, possible seizure, mild rhabdo, underlying ESRD vs nstemi type 2 in the setting of htn urgency   EKG NSR with PACs  Follow on cardiac echo  Monitor

## 2020-07-06 NOTE — PLAN OF CARE
Problem: Prexisting or High Potential for Compromised Skin Integrity  Goal: Skin integrity is maintained or improved  Description  INTERVENTIONS:  - Identify patients at risk for skin breakdown  - Assess and monitor skin integrity  - Assess and monitor nutrition and hydration status  - Monitor labs   - Assess for incontinence   - Turn and reposition patient  - Assist with mobility/ambulation  - Relieve pressure over bony prominences  - Avoid friction and shearing  - Provide appropriate hygiene as needed including keeping skin clean and dry  - Evaluate need for skin moisturizer/barrier cream  - Collaborate with interdisciplinary team   - Patient/family teaching  - Consider wound care consult   Outcome: Progressing     Problem: Potential for Falls  Goal: Patient will remain free of falls  Description  INTERVENTIONS:  - Assess patient frequently for physical needs  -  Identify cognitive and physical deficits and behaviors that affect risk of falls    -  [place] fall precautions as indicated by assessment   - Educate patient/family on patient safety including physical limitations  - Instruct patient to call for assistance with activity based on assessment  - Modify environment to reduce risk of injury  - Consider OT/PT consult to assist with strengthening/mobility  Outcome: Progressing     Problem: PAIN - ADULT  Goal: Verbalizes/displays adequate comfort level or baseline comfort level  Description  Interventions:  - Encourage patient to monitor pain and request assistance  - Assess pain using appropriate pain scale  - Administer analgesics based on type and severity of pain and evaluate response  - Implement non-pharmacological measures as appropriate and evaluate response  - Consider cultural and social influences on pain and pain management  - Notify physician/advanced practitioner if interventions unsuccessful or patient reports new pain  Outcome: Progressing     Problem: INFECTION - ADULT  Goal: Absence or prevention of progression during hospitalization  Description  INTERVENTIONS:  - Assess and monitor for signs and symptoms of infection  - Monitor lab/diagnostic results  - Monitor all insertion sites, i e  indwelling lines, tubes, and drains  - Monitor endotracheal if appropriate and nasal secretions for changes in amount and color  - [place] appropriate cooling/warming therapies per order  - Administer medications as ordered  - Instruct and encourage patient and family to use good hand hygiene technique  - Identify and instruct in appropriate isolation precautions for identified infection/condition  Outcome: Progressing  Goal: Absence of fever/infection during neutropenic period  Description  INTERVENTIONS:  - Monitor WBC    Outcome: Progressing     Problem: SAFETY ADULT  Goal: Maintain or return to baseline ADL function  Description  INTERVENTIONS:  -  Assess patient's ability to carry out ADLs; assess patient's baseline for ADL function and identify physical deficits which impact ability to perform ADLs (bathing, care of mouth/teeth, toileting, grooming, dressing, etc )  - Assess/evaluate cause of self-care deficits   - Assess range of motion  - Assess patient's mobility; develop plan if impaired  - Assess patient's need for assistive devices and provide as appropriate  - Encourage maximum independence but intervene and supervise when necessary  - Involve family in performance of ADLs  - Assess for home care needs following discharge   - Consider OT consult to assist with ADL evaluation and planning for discharge  - Provide patient education as appropriate  Outcome: Progressing  Goal: Maintain or return mobility status to optimal level  Description  INTERVENTIONS:  - Assess patient's baseline mobility status (ambulation, transfers, stairs, etc )    - Identify cognitive and physical deficits and behaviors that affect mobility  - Identify mobility aids required to assist with transfers and/or ambulation (gait belt, sit-to-stand, lift, walker, cane, etc )  - [place] fall precautions as indicated by assessment  - Record patient progress and toleration of activity level on Mobility SBAR; progress patient to next Phase/Stage  - Instruct patient to call for assistance with activity based on assessment  - Consider rehabilitation consult to assist with strengthening/weightbearing, etc   Outcome: Progressing     Problem: DISCHARGE PLANNING  Goal: Discharge to home or other facility with appropriate resources  Description  INTERVENTIONS:  - Identify barriers to discharge w/patient and caregiver  - Arrange for needed discharge resources and transportation as appropriate  - Identify discharge learning needs (meds, wound care, etc )  - Arrange for interpretive services to assist at discharge as needed  - Refer to Case Management Department for coordinating discharge planning if the patient needs post-hospital services based on physician/advanced practitioner order or complex needs related to functional status, cognitive ability, or social support system  Outcome: Progressing     Problem: Knowledge Deficit  Goal: Patient/family/caregiver demonstrates understanding of disease process, treatment plan, medications, and discharge instructions  Description  Complete learning assessment and assess knowledge base  Interventions:  - Provide teaching at level of understanding  - Provide teaching via preferred learning methods  Outcome: Progressing     Problem: Nutrition/Hydration-ADULT  Goal: Nutrient/Hydration intake appropriate for improving, restoring or maintaining nutritional needs  Description  Monitor and assess patient's nutrition/hydration status for malnutrition  Collaborate with interdisciplinary team and initiate plan and interventions as ordered  Monitor patient's weight and dietary intake as ordered or per policy  Utilize nutrition screening tool and intervene as necessary   Determine patient's food preferences and provide high-protein, high-caloric foods as appropriate       INTERVENTIONS:  - Monitor oral intake, urinary output, labs, and treatment plans  - Assess nutrition and hydration status and recommend course of action  - Evaluate amount of meals eaten  - Assist patient with eating if necessary   - Allow adequate time for meals  - Recommend/ encourage appropriate diets, oral nutritional supplements, and vitamin/mineral supplements  - Order, calculate, and assess calorie counts as needed  - Recommend, monitor, and adjust tube feedings and TPN/PPN based on assessed needs  - Assess need for intravenous fluids  - Provide specific nutrition/hydration education as appropriate  - Include patient/family/caregiver in decisions related to nutrition  Outcome: Progressing

## 2020-07-06 NOTE — RESTORATIVE TECHNICIAN NOTE
Restorative Specialist Mobility Note       Activity: Other (Comment)(Pt currently off the unit at dialysis )       Fatou GALARZA, Restorative Technician, [employer]

## 2020-07-06 NOTE — ASSESSMENT & PLAN NOTE
Continue trazodone at bedtime   Moved in dementia unit [facility] on Friday 7/3  Delirium precautions  Geriatric consulted for further management

## 2020-07-06 NOTE — ASSESSMENT & PLAN NOTE
- likely a non-MI troponin elevation in the setting of fall, coupled with accelerated blood pressure on admission and mild rhabdomyolysis, w/ history of ESRD  - troponin peak of 0 08  - echocardiogram revealed normal EF with no evidence of LV regional wall motion abnormalities

## 2020-07-06 NOTE — SOCIAL WORK
Pt discussed during care coordination rounds & not medically cleared for dc  Will eval day by day  CM asked to check with [facility] to see about 1:1 & restraints  S/w Phyllis whom states pt does not meed to be off 24 hrs prior to dc as long as the pt is not on them for being combative  Pt is from locked dementia unit at baseline  Updated SLIM

## 2020-07-06 NOTE — CONSULTS
Consultation - Geriatric Medicine   Carmen Neal 70 y o  female MRN: [id number]  Unit/Bed#: [facility] 913-01 Encounter: [id number]      Assessment/Plan     1) Ambulatory Dysfunction  -S/P unwitnessed fall at [facility] Dementia unit  -CT head 7/5 showed no acute intracranial process, no skull fracture  -CT spine: no cervical spine fracture or malalignment  -Per partner Marrero Draft, patient is wheelchair bound for over 1 year, previously able to ambulate with walker  -Recommend PT/OT evaluation, Fall precautions    2) Dementia   -Worsening over the course of 1 year per patient's partner Marrero Draft  -Bed and wheelchair bound  -Recently moved to Dementia unit at [facility] on Friday as family is unable to adequately care for patient, home aide recently retired  -Has been reported to be more agitated and angry over the last few weeks at home and requires mitts at night and rails on her bed as patient slides off bed regularly  -Continue Trazodone 50mg qHS  -Continue continual observation and mitts for safety   -Fall precautions, Delirium Precautions      3) Frailty:  -Clinical Frailty score of 7 (severely frail)  -bed bound and wheelchair bound  -Incontinent and dependent for ADLs   -Recently moved to dementia unit at [facility]   -Albumin: 2 8  -Recommend: fall precautions, continued 1:1, Nutrition consult, PT/OT consult, check Prealbumin, due to patient refusing PO intake this morning patient may benefit from IVF    4) Elevated troponin:  Elevated upon admission: 0 09> 0 08> 0 07  Etiology: Non MI troponin elevation due to fall and ESRD versus Type 2MI   Echo pending    5) ESRD   Dialysis on MWF schedule  Scheduled for dialysis this afternoon  Cr: 2 55 today  Nephrology following    6) Hypertension:  Elevated this morning at 178/81  Admitted with HTN urgency   Patient refused medications last night  PTA medications: Metoprolol Tartrate 50mg BID  Recommend resuming Metoprolol Tartrate 50mg BID if able to tolerate PO    7) Concern for Pneumonia   -CXR showed increased opacity in the right middle lobe with retraction of the minor fissure  -No leukocytosis, Procalcitonin elevated at 0 32  -COVID negative  -Continue Cefepime, recheck procalcitonin     8) Seizure disorder:  S/P fall at facility; concern for possible breakthrough seizure causing fall versus patient's agitation leading to fall vs patient's typical behavior per family  PTA medications: Keppra 500mg q daily except 1/2 tablet every MWF on dialysis days  Continue Keppra 500mg q daily and 250mg on MWF  Fall precautions and Seizure precautions    9) Protein-Calorie Malnutrition:  Albumin 2 8  Check Prealbumin  Recommend Nutrition consult     10) Impaired Mastication:  -poor dentition  -on mechanical soft diet at facility  -Continue Dysphagia diet     11) Rheumatoid Arthritis  -continue PTA Hydroxychloroquine 200mg q daily    12) GERD:  -continue PTA Pantoprazole 40mg q daily     13) Home medications:  Confirmed with [facility] : faxed medication list  -Cholestyramine 4gm  BID  -Folic Acid 663PQ q daily  -Hydroxychloroquine 200mg qdaily  -Keppra 500mg q daily and 1/2 tablet MWF on dialysis days  -Metoprolol Tartrate 50mg BID  -Nepro Carb steady Liquid BID  -Pantoprazole 40mg qdaily  -Trazodone 50mg q HS  -PRN: Midodrine 2 5mg q daily, hold for BP >100/50        History of Present Illness   Physician Requesting Consult: Cherelle Cao DO  Reason for Consult / Principal Problem: Advanced Dementia, ambulatory dysfunction  Hx and PE limited by: Advanced dementia  Additional history obtained from: Caregiver Vicky Headleys) and     HPI: Jeri Sauceda is a 70y o  year old female with history of Dementia, ambulatory dysfunction, ESRD on dialysis MWF, HTN, MI, seizure disorder, and CVA 1 year ago who presents following unwitnessed fall from bed while at the Dementia unit  Patient is a new resident at [facility] who just moved to the facility on Friday   Patient was found on the floor of her room by her caregiver  Following fall she was reporting pain but is unable to localize location of pain  Patient was sent to ED for further evaluation  CT head showed no acute intracranial process, no skull fracture, and chronic microangiopathy and small bifrontal cortical infarct  CT spine showed no cervical spine fracture or traumatic malalignment with cervical degenerative changes  CXR showed increased opacity in the right middle lobe and started on Cefepime due to suspicion of pneumonia  Spoke with patient's life partner Bridgett Bolton, who states that the patient is known to repeatedly "slide from bed" regularly when she was living at home prior to moving to the facility  Patient also has ambulatory dysfunction in which she is mainly wheelchair bound but about 1 year ago was able to ambulate with a walker  No past head injuries but due to the patient's repeated action of getting out of bed they had to place rails along the side of her bed  For the past year the patient's mental status has been worsening per Jessy Alamo  The patient used to live at home with Jessy Jasmeet and her daughter in law with family with the assistance of a home aide  However the home aide recently retired and the family is unable to lift or adequately care for the patient  In regards to her baseline, at times she is able to say "yes" or "no" or let her wishes be known but has been declining last few weeks  Jessy Alamo does state that she seems more "angry" and agitated at home  She needs mitts at night as she picks on her skin or tends to remove adult diaper  She is mainly bed bound and wheelchair bound  She does not use any glasses or hearing aides  Patient is also incontinent and uses an adult diaper for over 1 year  Patient also has a history of ESRD on dialysis MWF and did not get her dialysis on Friday per Jessy Alamo             Inpatient consult to Gerontology     Performed by  Alexander Selby DO     Authorized by Martínez Garcia DO              Review of Systems   Unable to perform ROS: Dementia       Historical Information   Past Medical History:   Diagnosis Date    [facility]     Epilepsy ([facility] )     Kidney disease     Seizure ([facility] )      History reviewed  No pertinent surgical history  Social History   Social History     Substance and Sexual Activity   Alcohol Use Not Currently     Social History     Substance and Sexual Activity   Drug Use Never     Social History     Tobacco Use   Smoking Status Unknown If Ever Smoked     Family History: History reviewed  No pertinent family history      Meds/Allergies   current meds:   Current Facility-Administered Medications   Medication Dose Route Frequency    acetaminophen (TYLENOL) tablet 650 mg  650 mg Oral Q6H PRN    [START ON 7/7/2020] amLODIPine (NORVASC) tablet 2 5 mg  2 5 mg Oral Daily    b complex-vitamin C-folic acid (NEPHROCAPS) capsule 1 capsule  1 capsule Oral Daily With Dinner    calcium carbonate (TUMS) chewable tablet 1,000 mg  1,000 mg Oral Daily PRN    cholestyramine sugar free (QUESTRAN LIGHT) packet 4 g  4 g Oral BID    folic acid (FOLVITE) tablet 1 mg  1 mg Oral Daily    heparin (porcine) subcutaneous injection 5,000 Units  5,000 Units Subcutaneous Q8H [address]    hydroxychloroquine (PLAQUENIL) tablet 200 mg  200 mg Oral Daily With Breakfast    [START ON 7/7/2020] levETIRAcetam (KEPPRA) 500 mg in sodium chloride 0 9 % 100 mL IVPB  500 mg Intravenous Once per day on Sun Tue Thu Sat    levETIRAcetam (KEPPRA) 750 mg in sodium chloride 0 9 % 100 mL IVPB  750 mg Intravenous Once per day on Mon Wed Fri    metoprolol (LOPRESSOR) injection 5 mg  5 mg Intravenous Q6H    ondansetron (ZOFRAN) injection 4 mg  4 mg Intravenous Q6H PRN    pantoprazole (PROTONIX) EC tablet 40 mg  40 mg Oral Daily    senna (SENOKOT) tablet 8 6 mg  1 tablet Oral Daily    traZODone (DESYREL) tablet 50 mg  50 mg Oral HS    and PTA meds:   Prior to Admission Medications Prescriptions Last Dose Informant Patient Reported? Taking? B Complex-C-Folic Acid (HORTENSIA-DAVIAN PO)   Yes Yes   Sig: Take by mouth daily   Nutritional Supplements (NEPRO/CARBSTEADY PO)   Yes Yes   Sig: Take by mouth   Pancrelipase, Lip-Prot-Amyl, (Zenpep) 31648-12116 units CPEP   Yes Yes   Sig: Take by mouth 3 (three) times a day   cholestyramine (QUESTRAN) 4 g packet   Yes Yes   Sig: Take 1 packet by mouth 2 (two) times a day with meals   folic acid (FOLVITE) 1 mg tablet   Yes Yes   Sig: Take by mouth daily   hydroxychloroquine (PLAQUENIL) 200 mg tablet   Yes Yes   Sig: Take 200 mg by mouth daily with breakfast   levETIRAcetam (KEPPRA) 500 mg tablet   Yes Yes   Sig: Take 500 mg by mouth see administration instructions 1 tablet daily  1/2 tablet a day every mon wed fri on dialysis days   lidocaine-prilocaine (EMLA) cream   Yes Yes   Sig: Apply topically see administration instructions Apply to arm 1 hour before dialysis on Monday, Wednesday, Friday   metoprolol tartrate (LOPRESSOR) 50 mg tablet   Yes Yes   Sig: Take 50 mg by mouth every 12 (twelve) hours   pantoprazole (PROTONIX) 40 mg tablet   Yes Yes   Sig: Take 40 mg by mouth daily   traZODone (DESYREL) 50 mg tablet   Yes Yes   Sig: Take 50 mg by mouth daily      Facility-Administered Medications: None       No Known Allergies    Objective     Intake/Output Summary (Last 24 hours) at 7/6/2020 1115  Last data filed at 7/6/2020 0900  Gross per 24 hour   Intake 253 33 ml   Output 446 ml   Net -192 67 ml     Invasive Devices     Peripheral Intravenous Line            Peripheral IV 07/05/20 Left;Upper Arm less than 1 day          Hemodialysis Catheter            HD Permanent Double Catheter -- days                Physical Exam   Constitutional: She appears cachectic  She is active and uncooperative  No distress  HENT:   Head: Normocephalic     Right Ear: External ear normal    Left Ear: External ear normal    Oral cavity appeared dried, poor dentition   Eyes: Pupils are equal, round, and reactive to light  EOM are normal    Neck: Neck supple  Cardiovascular: Normal rate and regular rhythm  Pulmonary/Chest: Effort normal and breath sounds normal  No respiratory distress  She has no wheezes  Abdominal: Soft  Bowel sounds are normal  She exhibits no distension  There is no tenderness  Musculoskeletal: She exhibits no edema or tenderness  Lymphadenopathy:     She has no cervical adenopathy  Neurological: She is alert  Pleasantly confused   Skin: Skin is warm and dry  Small skin lesion below left eye   Psychiatric:   dementia, says "yes" to everything    Vitals reviewed  Lab Results:   I have personally reviewed pertinent lab results including the following:  Lab Results   Component Value Date    WBC 3 20 (L) 07/06/2020    HGB 12 3 07/06/2020    HCT 39 2 07/06/2020     (H) 07/06/2020    PLT 88 (L) 07/06/2020     Lab Results   Component Value Date    SODIUM 137 07/06/2020    K 4 2 07/06/2020     07/06/2020    CO2 24 07/06/2020    BUN 40 (H) 07/06/2020    CREATININE 2 55 (H) 07/06/2020    GLUC 46 (L) 07/06/2020    CALCIUM 9 4 07/06/2020         I have personally reviewed the following imaging study reports:  XR chest 2 views   ED Interpretation by Betsy Aschoff, MD (07/05 1035)   RML infiltrate      Final Result by Edgar Armas MD (07/05 1409)      Increased opacity in the right middle lobe with retraction of the minor fissure, at least in part due to atelectasis  Superimposed pneumonia cannot be excluded  Small left pleural effusion and left base atelectasis  Superimposed pneumonia cannot be excluded  Workstation performed: [id number]         CT head without contrast   Final Result by Booker Vincent MD (07/05 6444)      No acute intracranial process  No skull fracture  Chronic microangiopathy and small bifrontal cortical infarcts                                      Workstation performed: [id number]         CT cervical spine without contrast   Final Result by Rosario Worley MD (07/05 1593)      No cervical spine fracture or traumatic malalignment  Mild multilevel cervical degenerative changes most prominent at C4-5 and C5-6               Workstation performed: [id number]               Therapies:   PT: Not assessed at this time  OT: Not assessed at this time    VTE Prophylaxis: Heparin    Code Status: Level 3 - DNAR and DNI  Advance Directive and Living Will:      Power of :    POLST:      Family and Social Support:   -Partner: Mahnaz Hernandez [phone]  Living Arrangements: Other (Comment) ([address] [place] [address])  Support Systems: Other (Comment) ([address] [place] [address])  Assistance Needed: yes  Type of Current Residence: Nursing home  [address] Name: [address] [place] [address] Avenue: No      Goals of Care: Level [address] [facility], DO PGY-2  [place] [phone]

## 2020-07-07 ENCOUNTER — APPOINTMENT (INPATIENT)
Dept: NON INVASIVE DIAGNOSTICS | Facility: HOSPITAL | Age: 75
DRG: 557 | End: 2020-07-07
Payer: MEDICARE

## 2020-07-07 PROBLEM — M62.82 RHABDOMYOLYSIS: Status: ACTIVE | Noted: 2020-07-05

## 2020-07-07 PROBLEM — E43 SEVERE PROTEIN-CALORIE MALNUTRITION (HCC): Status: ACTIVE | Noted: 2020-07-07

## 2020-07-07 PROBLEM — Z91.81 STATUS POST FALL: Status: ACTIVE | Noted: 2020-07-05

## 2020-07-07 PROBLEM — I10 ESSENTIAL HYPERTENSION: Status: ACTIVE | Noted: 2020-07-07

## 2020-07-07 PROBLEM — D61.818 PANCYTOPENIA (HCC): Status: ACTIVE | Noted: 2020-07-06

## 2020-07-07 LAB
ANION GAP SERPL CALCULATED.3IONS-SCNC: 11 MMOL/L (ref 4–13)
BUN SERPL-MCNC: 17 MG/DL (ref 5–25)
CALCIUM SERPL-MCNC: 9.1 MG/DL (ref 8.3–10.1)
CHLORIDE SERPL-SCNC: 103 MMOL/L (ref 100–108)
CK MB SERPL-MCNC: 214.9 NG/ML (ref 0–5)
CK MB SERPL-MCNC: 40.9 % (ref 0–2.5)
CK SERPL-CCNC: 526 U/L (ref 26–192)
CO2 SERPL-SCNC: 25 MMOL/L (ref 21–32)
CREAT SERPL-MCNC: 1.41 MG/DL (ref 0.6–1.3)
GFR SERPL CREATININE-BSD FRML MDRD: 37 ML/MIN/1.73SQ M
GLUCOSE SERPL-MCNC: 167 MG/DL (ref 65–140)
GLUCOSE SERPL-MCNC: 34 MG/DL (ref 65–140)
GLUCOSE SERPL-MCNC: 37 MG/DL (ref 65–140)
GLUCOSE SERPL-MCNC: 52 MG/DL (ref 65–140)
GLUCOSE SERPL-MCNC: 86 MG/DL (ref 65–140)
GLUCOSE SERPL-MCNC: 87 MG/DL (ref 65–140)
MRSA NOSE QL CULT: NORMAL
POTASSIUM SERPL-SCNC: 4.2 MMOL/L (ref 3.5–5.3)
PROCALCITONIN SERPL-MCNC: 0.41 NG/ML
SODIUM SERPL-SCNC: 139 MMOL/L (ref 136–145)

## 2020-07-07 PROCEDURE — 82948 REAGENT STRIP/BLOOD GLUCOSE: CPT

## 2020-07-07 PROCEDURE — 99232 SBSQ HOSP IP/OBS MODERATE 35: CPT | Performed by: INTERNAL MEDICINE

## 2020-07-07 PROCEDURE — 93306 TTE W/DOPPLER COMPLETE: CPT | Performed by: INTERNAL MEDICINE

## 2020-07-07 PROCEDURE — 93306 TTE W/DOPPLER COMPLETE: CPT

## 2020-07-07 PROCEDURE — 82550 ASSAY OF CK (CPK): CPT | Performed by: INTERNAL MEDICINE

## 2020-07-07 PROCEDURE — 82553 CREATINE MB FRACTION: CPT | Performed by: INTERNAL MEDICINE

## 2020-07-07 PROCEDURE — 84145 PROCALCITONIN (PCT): CPT | Performed by: PHYSICIAN ASSISTANT

## 2020-07-07 PROCEDURE — 80048 BASIC METABOLIC PNL TOTAL CA: CPT | Performed by: INTERNAL MEDICINE

## 2020-07-07 RX ORDER — DEXTROSE MONOHYDRATE 25 G/50ML
INJECTION, SOLUTION INTRAVENOUS
Status: COMPLETED
Start: 2020-07-07 | End: 2020-07-07

## 2020-07-07 RX ORDER — ACETAMINOPHEN 325 MG/1
325 TABLET ORAL ONCE
Status: COMPLETED | OUTPATIENT
Start: 2020-07-07 | End: 2020-07-08

## 2020-07-07 RX ORDER — ACETAMINOPHEN 325 MG/1
975 TABLET ORAL EVERY 6 HOURS PRN
Status: DISCONTINUED | OUTPATIENT
Start: 2020-07-08 | End: 2020-07-14 | Stop reason: HOSPADM

## 2020-07-07 RX ORDER — DEXTROSE MONOHYDRATE 25 G/50ML
50 INJECTION, SOLUTION INTRAVENOUS ONCE
Status: COMPLETED | OUTPATIENT
Start: 2020-07-07 | End: 2020-07-07

## 2020-07-07 RX ADMIN — DEXTROSE MONOHYDRATE 50 ML: 25 INJECTION, SOLUTION INTRAVENOUS at 07:53

## 2020-07-07 RX ADMIN — HEPARIN SODIUM 5000 UNITS: 5000 INJECTION INTRAVENOUS; SUBCUTANEOUS at 22:24

## 2020-07-07 RX ADMIN — METOPROLOL TARTRATE 5 MG: 5 INJECTION INTRAVENOUS at 05:05

## 2020-07-07 RX ADMIN — HEPARIN SODIUM 5000 UNITS: 5000 INJECTION INTRAVENOUS; SUBCUTANEOUS at 05:05

## 2020-07-07 RX ADMIN — METOPROLOL TARTRATE 5 MG: 5 INJECTION INTRAVENOUS at 11:51

## 2020-07-07 RX ADMIN — DEXTROSE MONOHYDRATE 50 ML: 25 INJECTION, SOLUTION INTRAVENOUS at 17:23

## 2020-07-07 RX ADMIN — HEPARIN SODIUM 5000 UNITS: 5000 INJECTION INTRAVENOUS; SUBCUTANEOUS at 13:22

## 2020-07-07 RX ADMIN — METOPROLOL TARTRATE 5 MG: 5 INJECTION INTRAVENOUS at 17:23

## 2020-07-07 RX ADMIN — LEVETIRACETAM 500 MG: 100 INJECTION, SOLUTION INTRAVENOUS at 08:51

## 2020-07-07 RX ADMIN — TRAZODONE HYDROCHLORIDE 50 MG: 50 TABLET ORAL at 22:24

## 2020-07-07 RX ADMIN — LIDOCAINE 1 PATCH: 50 PATCH TOPICAL at 08:51

## 2020-07-07 RX ADMIN — ACETAMINOPHEN 650 MG: 325 TABLET ORAL at 22:29

## 2020-07-07 NOTE — ASSESSMENT & PLAN NOTE
- BMI of 15 97 in the setting of chronic illness coupled with decreased appetite/oral intake with evidence of muscle wasting/atrophy on exam  - on Nepro nutritional supplementation

## 2020-07-07 NOTE — PROGRESS NOTES
Progress Note - Geriatric Medicine   Po Reich 70 y o  female MRN: [id number]  Unit/Bed#: [facility] 913-01 Encounter: [id number]      Assessment/Plan:    1) Hypoglycemia:  -AM glucose found to be at 34 after BMP reading of glucose at 37  -Given 1 dose of IV D50  -Repeat glucose per at 86  -Continue to monitor, recommend encouraging PO intake        2) Ambulatory Dysfunction  -S/P unwitnessed fall at [facility] Dementia unit  -CT head 7/5 showed no acute intracranial process, no skull fracture  -CT spine: no cervical spine fracture or malalignment  -Per partner Dg Sarabia, patient is wheelchair bound for over 1 year, previously able to ambulate with walker  - PT/OT recommends return to [facility] Dementia unit  -Recommend return to [facility] Dementia unit with close monitoring once medically cleared by primary team   -Continue fall precautions and continual observation     3) Dementia   -Worsening over the course of 1 year per patient's partner Dg Sarabia  -Bed and wheelchair bound  -Recently moved to Dementia unit at [facility] on Friday as family is unable to adequately care for patient, home aide recently retired  -Has been reported to be more agitated and angry over the last few weeks at home and requires mitts at night and rails on her bed as patient slides off bed regularly  -Continue Trazodone 50mg qHS  -Continue continual observation and mitts for safety   -Fall precautions, Delirium Precautions with lights and TV off at night, minimize interruptions during night, window shades up during the day to reset sleep cycle   -Recommend starting low dose Seroquel in evening if patient exhibits any agitation at night  -Recommend Melatonin 3mg qHS to assist with sleep cycle         4) Frailty:  -Clinical Frailty score of 7 (severely frail)  -bed bound and wheelchair bound  -Incontinent and dependent for ADLs   -Recently moved to dementia unit at [facility]   -Albumin: 2 8  -Nutrition added dietary nutrition supplements with dinner  -Continue fall precautions, continued 1:1, encourage PO intake     5) Elevated troponin:  Elevated upon admission: 0 09> 0 08> 0 07  Etiology: Non MI troponin elevation due to fall and ESRD versus Type 2MI   Echo pending results     6) ESRD   Dialysis on MWF schedule  Cr: 1 41, improved from  2 55 yesterday   Nephrology following     7) Hypertension:  BP today at 131/61  PTA medications: Metoprolol Tartrate 50mg BID  Continue Amlodipine 2 5mg po q daily and IV Metoprolol 5mg q6hrs  Recommend resuming Metoprolol Tartrate 50mg BID if able to tolerate PO       8) Concern for Pneumonia   -CXR showed increased opacity in the right middle lobe with retraction of the minor fissure  -No leukocytosis, Procalcitonin elevated at 0 32 on 7/6 and repeat on 7/7 at 0 41  -COVID negative  -Blood cultures: negative at 24 hrs x2  -Given one dose of Cefepime on 7/6  -continue to monitor fever curve and WBC count     9) Seizure disorder:  S/P fall at facility; concern for possible breakthrough seizure causing fall versus patient's agitation leading to fall vs patient's typical behavior per family  PTA medications: Keppra 500mg q daily except 1/2 tablet every MWF on dialysis days  Continue Keppra 500mg q daily and 250mg on MWF  Fall precautions and Seizure precautions     10) Protein-Calorie Malnutrition:  Albumin 2 8  Check Prealbumin  Dietary supplements with dinner      11) Impaired Mastication:  -poor dentition  -on mechanical soft diet at facility  -Continue Dysphagia diet      12) Rheumatoid Arthritis  -continue PTA Hydroxychloroquine 200mg q daily     13) GERD:  -continue PTA Pantoprazole 40mg q daily        Subjective:   Patient seen and examined at bedside  No acute events overnight however patient was noted to not sleep much overnight but was sleepy during the morning hours  Likely due to her sleep cycle interrupted   Patient also found to have hypoglycemia in the morning with level of 37 from BMP and recheck at 34 and given IV D50 and repeat at 86  She has had continued decrease PO intake but is drinking more compared to yesterday  Review of Systems   Unable to perform ROS: Dementia       Objective:     Vitals: Blood pressure 131/61, pulse 66, temperature (!) 96 5 °F (35 8 °C), resp  rate 20, height 5' 4" (1 626 m), weight 42 2 kg (93 lb 0 6 oz), SpO2 97 %  ,Body mass index is 15 97 kg/m²  Intake/Output Summary (Last 24 hours) at 7/7/2020 1123  Last data filed at 7/7/2020 0578  Gross per 24 hour   Intake 600 ml   Output 1007 ml   Net -407 ml       Current Medications: Reviewed    Physical Exam:   Physical Exam   Constitutional: She appears cachectic  She is cooperative  She has a sickly appearance  No distress  HENT:   Head: Normocephalic and atraumatic  Nose: Nose normal    Poor dentition, oral cavity appeared less dry compared to yesterday     Eyes: Pupils are equal, round, and reactive to light  EOM are normal    Neck: Neck supple  Cardiovascular: Normal rate and regular rhythm  Pulmonary/Chest: Effort normal and breath sounds normal  No respiratory distress  She has no wheezes  Abdominal: Soft  Bowel sounds are normal  She exhibits no distension  There is no tenderness  Musculoskeletal: She exhibits no edema or tenderness  Mitts on bilaterally   Neurological: She is alert  Skin: Skin is warm and dry  Vitals reviewed         Invasive Devices     Peripheral Intravenous Line            Peripheral IV 07/06/20 Left;Ventral (anterior) Forearm less than 1 day          Hemodialysis Catheter            HD Permanent Double Catheter -- days                Lab, Imaging and other studies:   Lab Results   Component Value Date    WBC 3 20 (L) 07/06/2020    HGB 12 3 07/06/2020    HCT 39 2 07/06/2020     (H) 07/06/2020    PLT 88 (L) 07/06/2020     Lab Results   Component Value Date    SODIUM 139 07/07/2020    K 4 2 07/07/2020     07/07/2020    CO2 25 07/07/2020    BUN 17 07/07/2020 CREATININE 1 41 (H) 07/07/2020    GLUC 37 (LL) 07/07/2020    CALCIUM 9 1 07/07/2020     Lab Results   Component Value Date    CKTOTAL 526 (H) 07/07/2020    CKMB 232 4 (H) 07/06/2020    CKMBINDEX 39 9 (H) 07/06/2020    TROPONINI 0 07 (H) 07/05/2020     XR chest 2 views   ED Interpretation by Anabella Mercado MD (07/05 1035)   RML infiltrate      Final Result by Delia Stock MD (07/05 1406)      Increased opacity in the right middle lobe with retraction of the minor fissure, at least in part due to atelectasis  Superimposed pneumonia cannot be excluded  Small left pleural effusion and left base atelectasis  Superimposed pneumonia cannot be excluded  Workstation performed: [id number]         CT head without contrast   Final Result by Jin Hensley MD (07/05 2888)      No acute intracranial process  No skull fracture  Chronic microangiopathy and small bifrontal cortical infarcts  Workstation performed: [id number]         CT cervical spine without contrast   Final Result by Jin Hensley MD (07/05 0665)      No cervical spine fracture or traumatic malalignment  Mild multilevel cervical degenerative changes most prominent at C4-5 and C5-6               Workstation performed: [id number]                 Hardeep Roach,  PGY-2  [place] [facility]  2

## 2020-07-07 NOTE — PLAN OF CARE
Problem: Prexisting or High Potential for Compromised Skin Integrity  Goal: Skin integrity is maintained or improved  Description  INTERVENTIONS:  - Identify patients at risk for skin breakdown  - Assess and monitor skin integrity  - Assess and monitor nutrition and hydration status  - Monitor labs   - Assess for incontinence   - Turn and reposition patient  - Assist with mobility/ambulation  - Relieve pressure over bony prominences  - Avoid friction and shearing  - Provide appropriate hygiene as needed including keeping skin clean and dry  - Evaluate need for skin moisturizer/barrier cream  - Collaborate with interdisciplinary team   - Patient/family teaching  - Consider wound care consult   Outcome: Progressing     Problem: Potential for Falls  Goal: Patient will remain free of falls  Description  INTERVENTIONS:  - Assess patient frequently for physical needs  -  Identify cognitive and physical deficits and behaviors that affect risk of falls    -  [place] fall precautions as indicated by assessment   - Educate patient/family on patient safety including physical limitations  - Instruct patient to call for assistance with activity based on assessment  - Modify environment to reduce risk of injury  - Consider OT/PT consult to assist with strengthening/mobility  Outcome: Progressing     Problem: PAIN - ADULT  Goal: Verbalizes/displays adequate comfort level or baseline comfort level  Description  Interventions:  - Encourage patient to monitor pain and request assistance  - Assess pain using appropriate pain scale  - Administer analgesics based on type and severity of pain and evaluate response  - Implement non-pharmacological measures as appropriate and evaluate response  - Consider cultural and social influences on pain and pain management  - Notify physician/advanced practitioner if interventions unsuccessful or patient reports new pain  Outcome: Progressing     Problem: SAFETY ADULT  Goal: Maintain or return to baseline ADL function  Description  INTERVENTIONS:  -  Assess patient's ability to carry out ADLs; assess patient's baseline for ADL function and identify physical deficits which impact ability to perform ADLs (bathing, care of mouth/teeth, toileting, grooming, dressing, etc )  - Assess/evaluate cause of self-care deficits   - Assess range of motion  - Assess patient's mobility; develop plan if impaired  - Assess patient's need for assistive devices and provide as appropriate  - Encourage maximum independence but intervene and supervise when necessary  - Involve family in performance of ADLs  - Assess for home care needs following discharge   - Consider OT consult to assist with ADL evaluation and planning for discharge  - Provide patient education as appropriate  Outcome: Progressing  Goal: Maintain or return mobility status to optimal level  Description  INTERVENTIONS:  - Assess patient's baseline mobility status (ambulation, transfers, stairs, etc )    - Identify cognitive and physical deficits and behaviors that affect mobility  - Identify mobility aids required to assist with transfers and/or ambulation (gait belt, sit-to-stand, lift, walker, cane, etc )  - [place] fall precautions as indicated by assessment  - Record patient progress and toleration of activity level on Mobility SBAR; progress patient to next Phase/Stage  - Instruct patient to call for assistance with activity based on assessment  - Consider rehabilitation consult to assist with strengthening/weightbearing, etc   Outcome: Progressing     Problem: DISCHARGE PLANNING  Goal: Discharge to home or other facility with appropriate resources  Description  INTERVENTIONS:  - Identify barriers to discharge w/patient and caregiver  - Arrange for needed discharge resources and transportation as appropriate  - Identify discharge learning needs (meds, wound care, etc )  - Arrange for interpretive services to assist at discharge as needed  - Refer to Case Management Department for coordinating discharge planning if the patient needs post-hospital services based on physician/advanced practitioner order or complex needs related to functional status, cognitive ability, or social support system  Outcome: Progressing     Problem: Knowledge Deficit  Goal: Patient/family/caregiver demonstrates understanding of disease process, treatment plan, medications, and discharge instructions  Description  Complete learning assessment and assess knowledge base  Interventions:  - Provide teaching at level of understanding  - Provide teaching via preferred learning methods  Outcome: Progressing     Problem: Nutrition/Hydration-ADULT  Goal: Nutrient/Hydration intake appropriate for improving, restoring or maintaining nutritional needs  Description  Monitor and assess patient's nutrition/hydration status for malnutrition  Collaborate with interdisciplinary team and initiate plan and interventions as ordered  Monitor patient's weight and dietary intake as ordered or per policy  Utilize nutrition screening tool and intervene as necessary  Determine patient's food preferences and provide high-protein, high-caloric foods as appropriate       INTERVENTIONS:  - Monitor oral intake, urinary output, labs, and treatment plans  - Assess nutrition and hydration status and recommend course of action  - Evaluate amount of meals eaten  - Assist patient with eating if necessary   - Allow adequate time for meals  - Recommend/ encourage appropriate diets, oral nutritional supplements, and vitamin/mineral supplements  - Order, calculate, and assess calorie counts as needed  - Recommend, monitor, and adjust tube feedings and TPN/PPN based on assessed needs  - Assess need for intravenous fluids  - Provide specific nutrition/hydration education as appropriate  - Include patient/family/caregiver in decisions related to nutrition  Outcome: Progressing     Problem: SAFETY,RESTRAINT: NV/NON-SELF DESTRUCTIVE BEHAVIOR  Goal: Remains free of harm/injury (restraint for non violent/non self-detsructive behavior)  Description  INTERVENTIONS:  - Instruct patient/family regarding restraint use   - Assess and monitor physiologic and psychological status   - Provide interventions and comfort measures to meet assessed patient needs   - Identify and implement measures to help patient regain control  - Assess readiness for release of restraint   Outcome: Progressing     Problem: SAFETY,RESTRAINT: NV/NON-SELF DESTRUCTIVE BEHAVIOR  Goal: Returns to optimal restraint-free functioning  Description  INTERVENTIONS:  - Assess the patient's behavior and symptoms that indicate continued need for restraint  - Identify and implement measures to help patient regain control  - Assess readiness for release of restraint   Outcome: Not Progressing     Problem: INFECTION - ADULT  Goal: Absence or prevention of progression during hospitalization  Description  INTERVENTIONS:  - Assess and monitor for signs and symptoms of infection  - Monitor lab/diagnostic results  - Monitor all insertion sites, i e  indwelling lines, tubes, and drains  - Monitor endotracheal if appropriate and nasal secretions for changes in amount and color  - [place] appropriate cooling/warming therapies per order  - Administer medications as ordered  - Instruct and encourage patient and family to use good hand hygiene technique  - Identify and instruct in appropriate isolation precautions for identified infection/condition  Outcome: Completed  Goal: Absence of fever/infection during neutropenic period  Description  INTERVENTIONS:  - Monitor WBC    Outcome: Completed

## 2020-07-07 NOTE — SOCIAL WORK
TCT Emerson, SHANKAR, notified of potential disch 1-2 days    Agreeable to accept back    Discussed rails on bed for patient, states they are not allowed to have rails on the bed and family is aware this is not available

## 2020-07-07 NOTE — PROGRESS NOTES
[facility] - Progress Note  Patient: Tish Green 70 y o  female MRN: [id number]  [facility] Encounter: [id number]  Primary Care Provider: No primary care provider on file    Date Of Visit: 07/07/20        Assessment & Plan:    * Status post fall  Assessment & Plan  - unwitnessed fall out of bed at skilled facility  - CT of head and cervical spine unremarkable for hemorrhaging, fractures, or dislocations  - PT/OT as tolerated -> resides in a lock-down dementia unit however  - remains afebrile - CXR revealed a possible infiltrate but without upper respiratory symptoms -> monitor off antibiotics -> COVID-19 testing on 7/5 negative - urinalysis with trace pyuria but nitrite negative in the absence of urinary symptoms  - anticipate discharge in 24-48 hours pending clinical improvement    Dementia  Assessment & Plan  - resides at a locked dementia unit @ [facility]  - c/w Trazadone QHS  - delirium precautions  - appreciate geriatrics input  - awaiting speech evaluation for safe/modified diet recommendation    Acute rhabdomyolysis  Assessment & Plan  - secondary to fall  - CPK trend:  583 -> 526 today    ESRD (end stage renal disease)   Assessment & Plan  - continue routine hemodialysis per nephrology  - on Nephrocaps    Severe protein-calorie malnutrition  Assessment & Plan  - BMI of 15 97 in the setting of chronic illness coupled with decreased appetite/oral intake with evidence of muscle wasting/atrophy on exam  - on Nepro nutritional supplementation    Pancytopenia   Assessment & Plan  - monitor CBC and transfuse as necessary    Essential hypertension  Assessment & Plan  - continue Norvasc/Lopressor    Seizures  Assessment & Plan  - continue Keppra  - seizure precautions    Rheumatoid arthritis  Assessment & Plan  - continue Plaquenil    Elevated troponin  Assessment & Plan  - likely a non-MI troponin elevation in the setting of fall, coupled with accelerated blood pressure on admission and mild rhabdomyolysis, w/ history of ESRD  - troponin peak of 0 08  - echocardiogram revealed normal EF with no evidence of LV regional wall motion abnormalities      DVT Prophylaxis:  Heparin SC      Patient Centered Rounds:  I have performed bedside rounds and discussed plan of care with nursing today  Discussions with Specialists or Other Care Team Provider:  see above assessments if applicable    Education and Discussions with Family / Patient:  Significant other, Dori Juares, over the phone @ [phone]    Time Spent for Care:  32 minutes  More than 50% of total time spent on counseling and coordination of care as described above  Current Length of Stay: 2 day(s)    Current Patient Status: Inpatient   Certification Statement:  Patient will continue to require additional hospital stay due to assessments as noted above  Code Status: Level 3 - DNAR and DNI        Subjective:     Seen/examined earlier today  Resting fairly comfortably bed  Remains in relatively pleasant spirits  Nursing does report decreased appetite today, however,  Objective:     Vitals:   Temp (24hrs), Av 8 °F (36 6 °C), Min:96 5 °F (35 8 °C), Max:99 3 °F (37 4 °C)    Temp:  [96 5 °F (35 8 °C)-99 3 °F (37 4 °C)] 99 3 °F (37 4 °C)  HR:  [63-83] 67  Resp:  [19-20] 19  BP: (114-197)/(50-89) 132/60  SpO2:  [95 %-98 %] 95 %  Body mass index is 15 97 kg/m²  Input and Output Summary (last 24 hours):        Intake/Output Summary (Last 24 hours) at 2020 1755  Last data filed at 2020 1233  Gross per 24 hour   Intake 360 ml   Output 0 ml   Net 360 ml       Physical Exam:     GENERAL:  Weak/fatigued - cachectic  HEAD:  Normocephalic - atraumatic - temporal wasting evident  EYES: PERRL - EOMI   MOUTH:  Mucosa moist  NECK:  Supple - full range of motion - clavicular wasting noted  CARDIAC:  Rate controlled - S1/S2 positive  PULMONARY:  Fairly clear to auscultation - nonlabored respirations at rest  ABDOMEN:  Soft - nontender/nondistended - active bowel sounds  MUSCULOSKELETAL:  Motor strength/range of motion quite deconditioned  NEUROLOGIC:  Baseline demented  SKIN:  Chronic wrinkles/blemishes         Additional Data:     Labs & Recent Cultures:    Results from last 7 days   Lab Units 07/06/20  0603 07/05/20  0911   WBC Thousand/uL 3 20* 2 79*   HEMOGLOBIN g/dL 12 3 10 9*   HEMATOCRIT % 39 2 34 4*   PLATELETS Thousands/uL 88* 77*   NEUTROS PCT %  --  73   LYMPHS PCT %  --  19   MONOS PCT %  --  7   EOS PCT %  --  0     Results from last 7 days   Lab Units 07/07/20  0446  07/05/20  0911   POTASSIUM mmol/L 4 2   < > 4 2   CHLORIDE mmol/L 103   < > 102   CO2 mmol/L 25   < > 29   BUN mg/dL 17   < > 37*   CREATININE mg/dL 1 41*   < > 2 59*   CALCIUM mg/dL 9 1   < > 9 5   ALK PHOS U/L  --   --  129*   ALT U/L  --   --  54   AST U/L  --   --  70*    < > = values in this interval not displayed  Results from last 7 days   Lab Units 07/07/20  1706 07/07/20  1124 07/07/20  0745   POC GLUCOSE mg/dl 52* 86 34*         Results from last 7 days   Lab Units 07/07/20  0447 07/06/20  0603 07/05/20  1109   LACTIC ACID mmol/L  --   --  1 5   PROCALCITONIN ng/ml 0 41* 0 32*  --          Results from last 7 days   Lab Units 07/05/20  1940 07/05/20  1111 07/05/20  1109   BLOOD CULTURE   --  No Growth at 48 hrs  No Growth at 48 hrs     C DIFF TOXIN B  Negative  --   --          Last 24 Hours Medication List:     Current Facility-Administered Medications:  acetaminophen 650 mg Oral Q6H PRN Deng Zaman PA-C    amLODIPine 2 5 mg Oral Daily MD diana Rodriguez complex-vitamin C-folic acid 1 capsule Oral Daily With Dinner Deng Zaman PA-C    calcium carbonate 1,000 mg Oral Daily PRN Deng Zaman PA-C    cholestyramine sugar free 4 g Oral BID Deng Zaman PA-C    folic acid 1 mg Oral Daily Deng Zaman PA-C    heparin (porcine) 5,000 Units Subcutaneous Q8H [facility] Deng Zaman PA-C    hydroxychloroquine 200 mg Oral Daily With Breakfast Deng Zaman PA-C    levETIRAcetam 500 mg Intravenous Once per day on Sun Tue Thu [facility] RYAN Zaman Last Rate: Stopped (07/07/20 4571)   levETIRAcetam 750 mg Intravenous Once per day on Mon Wed Fri Heather Keith MD Last Rate: 750 mg (07/06/20 4634)   lidocaine 1 patch Topical Daily Lindsey Flanagan DO    metoprolol 5 mg Intravenous Q6H Deng Zaman PA-C    ondansetron 4 mg Intravenous Q6H PRN Deng Zaman PA-C    pantoprazole 40 mg Oral Daily Deng Zaman PA-C    senna 1 tablet Oral Daily Deng Zaman PA-C    traZODone 50 mg Oral HS Deng Zaman PA-C                   ** Please Note: This note is constructed using a voice recognition dictation system  An occasional wrong word/phrase or sound-a-like substitution may have been picked up by dictation device due to the inherent limitations of voice recognition software  Read the chart carefully and recognize, using reasonable context, where substitutions may have occurred  **

## 2020-07-07 NOTE — ASSESSMENT & PLAN NOTE
- unwitnessed fall out of bed at skilled facility  - CT of head and cervical spine unremarkable for hemorrhaging, fractures, or dislocations  - PT/OT as tolerated -> resides in a lock-down dementia unit however  - remains afebrile - CXR revealed a possible infiltrate but without upper respiratory symptoms -> monitor off antibiotics -> COVID-19 testing on 7/5 negative - urinalysis with trace pyuria but nitrite negative in the absence of urinary symptoms  - anticipate discharge in 24-48 hours pending clinical improvement

## 2020-07-07 NOTE — ASSESSMENT & PLAN NOTE
- resides at a locked dementia unit @ [facility]  - c/w Trazadone QHS  - delirium precautions  - appreciate geriatrics input  - awaiting speech evaluation for safe/modified diet recommendation

## 2020-07-08 ENCOUNTER — APPOINTMENT (INPATIENT)
Dept: RADIOLOGY | Facility: HOSPITAL | Age: 75
DRG: 557 | End: 2020-07-08
Attending: INTERNAL MEDICINE
Payer: MEDICARE

## 2020-07-08 PROBLEM — J69.0 ASPIRATION PNEUMONIA (HCC): Status: ACTIVE | Noted: 2020-07-08

## 2020-07-08 LAB
ANION GAP SERPL CALCULATED.3IONS-SCNC: 9 MMOL/L (ref 4–13)
BASOPHILS # BLD AUTO: 0.02 THOUSANDS/ΜL (ref 0–0.1)
BASOPHILS NFR BLD AUTO: 0 % (ref 0–1)
BUN SERPL-MCNC: 26 MG/DL (ref 5–25)
CALCIUM SERPL-MCNC: 8.6 MG/DL (ref 8.3–10.1)
CHLORIDE SERPL-SCNC: 104 MMOL/L (ref 100–108)
CK MB SERPL-MCNC: 23.3 % (ref 0–2.5)
CK MB SERPL-MCNC: 53.5 NG/ML (ref 0–5)
CK SERPL-CCNC: 230 U/L (ref 26–192)
CO2 SERPL-SCNC: 26 MMOL/L (ref 21–32)
CREAT SERPL-MCNC: 2.42 MG/DL (ref 0.6–1.3)
EOSINOPHIL # BLD AUTO: 0.01 THOUSAND/ΜL (ref 0–0.61)
EOSINOPHIL NFR BLD AUTO: 0 % (ref 0–6)
ERYTHROCYTE [DISTWIDTH] IN BLOOD BY AUTOMATED COUNT: 16.1 % (ref 11.6–15.1)
GFR SERPL CREATININE-BSD FRML MDRD: 19 ML/MIN/1.73SQ M
GLUCOSE SERPL-MCNC: 85 MG/DL (ref 65–140)
GLUCOSE SERPL-MCNC: 88 MG/DL (ref 65–140)
GLUCOSE SERPL-MCNC: 89 MG/DL (ref 65–140)
GLUCOSE SERPL-MCNC: 92 MG/DL (ref 65–140)
HCT VFR BLD AUTO: 32.8 % (ref 34.8–46.1)
HGB BLD-MCNC: 10.3 G/DL (ref 11.5–15.4)
IMM GRANULOCYTES # BLD AUTO: 0.01 THOUSAND/UL (ref 0–0.2)
IMM GRANULOCYTES NFR BLD AUTO: 0 % (ref 0–2)
LYMPHOCYTES # BLD AUTO: 1.34 THOUSANDS/ΜL (ref 0.6–4.47)
LYMPHOCYTES NFR BLD AUTO: 22 % (ref 14–44)
MCH RBC QN AUTO: 31.6 PG (ref 26.8–34.3)
MCHC RBC AUTO-ENTMCNC: 31.4 G/DL (ref 31.4–37.4)
MCV RBC AUTO: 101 FL (ref 82–98)
MONOCYTES # BLD AUTO: 0.36 THOUSAND/ΜL (ref 0.17–1.22)
MONOCYTES NFR BLD AUTO: 6 % (ref 4–12)
NEUTROPHILS # BLD AUTO: 4.27 THOUSANDS/ΜL (ref 1.85–7.62)
NEUTS SEG NFR BLD AUTO: 72 % (ref 43–75)
NRBC BLD AUTO-RTO: 0 /100 WBCS
PLATELET # BLD AUTO: 78 THOUSANDS/UL (ref 149–390)
PMV BLD AUTO: 10.2 FL (ref 8.9–12.7)
POTASSIUM SERPL-SCNC: 3.8 MMOL/L (ref 3.5–5.3)
PROCALCITONIN SERPL-MCNC: 0.78 NG/ML
RBC # BLD AUTO: 3.26 MILLION/UL (ref 3.81–5.12)
SODIUM SERPL-SCNC: 139 MMOL/L (ref 136–145)
WBC # BLD AUTO: 6.01 THOUSAND/UL (ref 4.31–10.16)

## 2020-07-08 PROCEDURE — 82550 ASSAY OF CK (CPK): CPT | Performed by: INTERNAL MEDICINE

## 2020-07-08 PROCEDURE — 82553 CREATINE MB FRACTION: CPT | Performed by: INTERNAL MEDICINE

## 2020-07-08 PROCEDURE — 80048 BASIC METABOLIC PNL TOTAL CA: CPT | Performed by: INTERNAL MEDICINE

## 2020-07-08 PROCEDURE — 99232 SBSQ HOSP IP/OBS MODERATE 35: CPT | Performed by: INTERNAL MEDICINE

## 2020-07-08 PROCEDURE — 82948 REAGENT STRIP/BLOOD GLUCOSE: CPT

## 2020-07-08 PROCEDURE — 84145 PROCALCITONIN (PCT): CPT | Performed by: INTERNAL MEDICINE

## 2020-07-08 PROCEDURE — 85025 COMPLETE CBC W/AUTO DIFF WBC: CPT | Performed by: INTERNAL MEDICINE

## 2020-07-08 PROCEDURE — 87040 BLOOD CULTURE FOR BACTERIA: CPT | Performed by: INTERNAL MEDICINE

## 2020-07-08 PROCEDURE — 92610 EVALUATE SWALLOWING FUNCTION: CPT

## 2020-07-08 PROCEDURE — 71045 X-RAY EXAM CHEST 1 VIEW: CPT

## 2020-07-08 RX ORDER — SODIUM CHLORIDE 9 MG/ML
75 INJECTION, SOLUTION INTRAVENOUS CONTINUOUS
Status: DISCONTINUED | OUTPATIENT
Start: 2020-07-08 | End: 2020-07-08

## 2020-07-08 RX ORDER — SODIUM CHLORIDE 9 MG/ML
50 INJECTION, SOLUTION INTRAVENOUS CONTINUOUS
Status: DISCONTINUED | OUTPATIENT
Start: 2020-07-08 | End: 2020-07-09

## 2020-07-08 RX ORDER — HYDRALAZINE HYDROCHLORIDE 20 MG/ML
5 INJECTION INTRAMUSCULAR; INTRAVENOUS EVERY 6 HOURS PRN
Status: DISCONTINUED | OUTPATIENT
Start: 2020-07-08 | End: 2020-07-14 | Stop reason: HOSPADM

## 2020-07-08 RX ADMIN — METOPROLOL TARTRATE 5 MG: 5 INJECTION INTRAVENOUS at 17:58

## 2020-07-08 RX ADMIN — METOPROLOL TARTRATE 5 MG: 5 INJECTION INTRAVENOUS at 23:44

## 2020-07-08 RX ADMIN — ACETAMINOPHEN 325 MG: 325 TABLET ORAL at 01:52

## 2020-07-08 RX ADMIN — HEPARIN SODIUM 5000 UNITS: 5000 INJECTION INTRAVENOUS; SUBCUTANEOUS at 22:04

## 2020-07-08 RX ADMIN — METOPROLOL TARTRATE 5 MG: 5 INJECTION INTRAVENOUS at 12:29

## 2020-07-08 RX ADMIN — LEVETIRACETAM 750 MG: 100 INJECTION, SOLUTION INTRAVENOUS at 16:45

## 2020-07-08 RX ADMIN — SODIUM CHLORIDE 50 ML/HR: 0.9 INJECTION, SOLUTION INTRAVENOUS at 13:28

## 2020-07-08 RX ADMIN — HEPARIN SODIUM 5000 UNITS: 5000 INJECTION INTRAVENOUS; SUBCUTANEOUS at 06:35

## 2020-07-08 RX ADMIN — LIDOCAINE 1 PATCH: 50 PATCH TOPICAL at 10:54

## 2020-07-08 RX ADMIN — HEPARIN SODIUM 5000 UNITS: 5000 INJECTION INTRAVENOUS; SUBCUTANEOUS at 16:45

## 2020-07-08 RX ADMIN — METRONIDAZOLE 500 MG: 500 INJECTION, SOLUTION INTRAVENOUS at 22:50

## 2020-07-08 RX ADMIN — CEFEPIME HYDROCHLORIDE 1000 MG: 1 INJECTION, POWDER, FOR SOLUTION INTRAMUSCULAR; INTRAVENOUS at 21:55

## 2020-07-08 RX ADMIN — SODIUM CHLORIDE 50 ML/HR: 0.9 INJECTION, SOLUTION INTRAVENOUS at 21:54

## 2020-07-08 NOTE — PROGRESS NOTES
Notified Dr Vu Mayberry of pt not voiding since last night & bladder scan 0  Will start on IVF  Applied purewick in order to get UA when pt does void  Will continue to monitor

## 2020-07-08 NOTE — PROGRESS NOTES
[facility] - Progress Note  Patient: Po Reich 70 y o  female MRN: [id number]  Unit/Bed#: [facility] 913-01 Encounter: [id number]  Primary Care Provider: No primary care provider on file    Date Of Visit: 07/08/20        Assessment & Plan:    * Status post fall  Assessment & Plan  - unwitnessed fall out of bed at skilled facility  - CT of head and cervical spine unremarkable for hemorrhaging, fractures, or dislocations  - PT/OT as tolerated -> resides in a lock-down dementia unit however  - remains afebrile - CXR revealed a possible infiltrate but without upper respiratory symptoms -> monitor off antibiotics -> COVID-19 testing on 7/5 negative - urinalysis with trace pyuria but nitrite negative in the absence of urinary symptoms    Dementia  Assessment & Plan  - resides at a locked dementia unit @ [facility]  - c/w Trazadone QHS  - delirium precautions  - appreciate geriatrics input  - appreciate speech evaluation -> recommending NPO for now  - will appreciate palliative care input regarding further goals of care as patient is clinically declining w/ lethargy and now worsening ability to swallow    Aspiration pneumonia   Assessment & Plan  - of the right mid/lower lobes in the setting of progressive dementia and associated dysphagia - confirmed on CXR imaging today  - appreciate speech/swallow input -> remains NPO  - initiated on IV Cefepime/Flagyl  - maximum temperature of a 102 1° last night - procalcitonin elevated 0 78 today (trend tomorrow) - blood cultures from 7/5 remain preliminarily negative - repeat cultures drawn today due to persistent fevers  - long-term prognosis currently poor -> palliative care to follow    Acute rhabdomyolysis  Assessment & Plan  - secondary to fall  - CPK trend:  583 -> 526 -> 230 today    ESRD (end stage renal disease)   Assessment & Plan  - continue routine hemodialysis per nephrology  - on Nephrocaps    Severe protein-calorie malnutrition  Assessment & Plan  - BMI of 15 97 in the setting of chronic illness coupled with decreased appetite/oral intake with evidence of muscle wasting/atrophy on exam  - on Nepro nutritional supplementation    Pancytopenia   Assessment & Plan  - monitor CBC and transfuse as necessary    Essential hypertension  Assessment & Plan  - continue Norvasc/Lopressor  - as currently NPO, PRN IV Hydralazine ordered    Seizures  Assessment & Plan  - continue Keppra  - seizure precautions    Rheumatoid arthritis  Assessment & Plan  - continue Plaquenil    Elevated troponin  Assessment & Plan  - likely a non-MI troponin elevation in the setting of fall, coupled with accelerated blood pressure on admission and mild rhabdomyolysis, w/ history of ESRD  - troponin peak of 0 08  - echocardiogram revealed normal EF with no evidence of LV regional wall motion abnormalities      DVT Prophylaxis:  Heparin SC       Patient Centered Rounds:  I have performed bedside rounds and discussed plan of care with nursing today  Discussions with Specialists or Other Care Team Provider:  see above assessments if applicable    Education and Discussions with Family / Patient: Discussed with significant other, Alessandra Lerner, and daughter, Navin John, over the phone  Time Spent for Care:  32 minutes  More than 50% of total time spent on counseling and coordination of care as described above  Current Length of Stay: 3 day(s)    Current Patient Status: Inpatient   Certification Statement:  Patient will continue to require additional hospital stay due to assessments as noted above  Code Status: Level 3 - DNAR and DNI        Subjective:     Seen/examined earlier in the day  Patient remains lethargic and per nursing, has not taken her oral meds or safely consuming food          Objective:     Vitals:   Temp (24hrs), Av 7 °F (37 6 °C), Min:97 2 °F (36 2 °C), Max:102 1 °F (38 9 °C)    Temp:  [97 2 °F (36 2 °C)-102 1 °F (38 9 °C)] 97 4 °F (36 3 °C)  HR:  [60-92] 64  Resp: [14-20] 16  BP: (107-128)/(43-75) 128/60  SpO2:  [91 %-100 %] 97 %  Body mass index is 15 21 kg/m²  Input and Output Summary (last 24 hours): Intake/Output Summary (Last 24 hours) at 7/8/2020 1922  Last data filed at 7/8/2020 1408  Gross per 24 hour   Intake 0 ml   Output 0 ml   Net 0 ml       Physical Exam:     GENERAL:  Weak/fatigued - cachectic and ill-appearing  HEAD:  Normocephalic - atraumatic - temporal wasting present  EYES: PERRL - EOMI   MOUTH:  Mucosa dry  NECK:  Supple - clavicular wasting noted  CARDIAC:  Rate control - S1/S2 positive  PULMONARY:  Diminished breath sounds - nonlabored respirations  ABDOMEN:  Soft - nontender/nondistended - active bowel sounds  MUSCULOSKELETAL:  Motor strength/range of motion markedly deconditioned  NEUROLOGIC:  Lethargic - minimally arousable to sternal rub today  SKIN:  Chronic wrinkles/blemishes       Additional Data:     Labs & Recent Cultures:    Results from last 7 days   Lab Units 07/08/20  0453   WBC Thousand/uL 6 01   HEMOGLOBIN g/dL 10 3*   HEMATOCRIT % 32 8*   PLATELETS Thousands/uL 78*   NEUTROS PCT % 72   LYMPHS PCT % 22   MONOS PCT % 6   EOS PCT % 0     Results from last 7 days   Lab Units 07/08/20  0453  07/05/20  0911   POTASSIUM mmol/L 3 8   < > 4 2   CHLORIDE mmol/L 104   < > 102   CO2 mmol/L 26   < > 29   BUN mg/dL 26*   < > 37*   CREATININE mg/dL 2 42*   < > 2 59*   CALCIUM mg/dL 8 6   < > 9 5   ALK PHOS U/L  --   --  129*   ALT U/L  --   --  54   AST U/L  --   --  70*    < > = values in this interval not displayed           Results from last 7 days   Lab Units 07/08/20  1756 07/08/20  1054 07/08/20  0557 07/07/20  2349 07/07/20  1759 07/07/20  1706 07/07/20  1124 07/07/20  0745   POC GLUCOSE mg/dl 88 92 85 87 167* 52* 86 34*         Results from last 7 days   Lab Units 07/08/20  1112 07/07/20  0447 07/06/20  0603 07/05/20  1109   LACTIC ACID mmol/L  --   --   --  1 5   PROCALCITONIN ng/ml 0 78* 0 41* 0 32*  --          Results from last 7 days   Lab Units 07/05/20  1940 07/05/20  1111 07/05/20  1109   BLOOD CULTURE   --  No Growth at 72 hrs  No Growth at 72 hrs  C DIFF TOXIN B  Negative  --   --          Last 24 Hours Medication List:     Current Facility-Administered Medications:  acetaminophen 975 mg Oral Q6H PRN RYAN Jean Baptiste complex-vitamin C-folic acid 1 capsule Oral Daily With Cayden Bingham PA-C    calcium carbonate 1,000 mg Oral Daily PRN Deng Zaman PA-C    cefepime 1,000 mg Intravenous Q24H Micheal Lee MD    cholestyramine sugar free 4 g Oral BID Deng Zaman PA-C    folic acid 1 mg Oral Daily Deng Zaman PA-C    heparin (porcine) 5,000 Units Subcutaneous Q8H [facility] Deng Zaman PA-C    hydrALAZINE 5 mg Intravenous Q6H PRN Micheal Lee MD    hydroxychloroquine 200 mg Oral Daily With Breakfast Deng Zaman PA-C    levETIRAcetam 500 mg Intravenous Once per day on Sun Tue Thu Sat Zina Harada Deleon, PA-C Last Rate: Stopped (07/07/20 3033)   levETIRAcetam 750 mg Intravenous Once per day on Mon Wed Fri Ludwig De La Cruz MD Last Rate: 750 mg (07/08/20 1645)   lidocaine 1 patch Topical Daily Lindsey Flanagan DO    metoprolol 5 mg Intravenous Q6H Deng Zaman PA-C    metroNIDAZOLE 500 mg Intravenous Q8H Micheal Lee MD    ondansetron 4 mg Intravenous Q6H PRN Deng Zaman PA-C    pantoprazole 40 mg Oral Daily Deng Zaman PA-C    senna 1 tablet Oral Daily Deng Zaman PA-C    sodium chloride 50 mL/hr Intravenous Continuous Micheal Lee MD Last Rate: 50 mL/hr (07/08/20 1328)   traZODone 50 mg Oral HS Deng Zaman PA-C                 ** Please Note: This note is constructed using a voice recognition dictation system  An occasional wrong word/phrase or sound-a-like substitution may have been picked up by dictation device due to the inherent limitations of voice recognition software    Read the chart carefully and recognize, using reasonable context, where substitutions may have occurred  **

## 2020-07-08 NOTE — SPEECH THERAPY NOTE
Speech-Language Pathology Bedside Swallow Evaluation      Patient Name: Chapin John    YFXHC'L Date: 7/8/2020     Problem List  Principal Problem:    Status post fall  Active Problems:    ESRD (end stage renal disease)     Dementia    Elevated troponin    Acute rhabdomyolysis    Seizures    Rheumatoid arthritis    Pancytopenia     Severe protein-calorie malnutrition    Essential hypertension      Past Medical History  Past Medical History:   Diagnosis Date    Dialysis patient ([facility] [address] )     Epilepsy ([facility] [address] )     Kidney disease     Seizure ([facility] [address] )        Past Surgical History  History reviewed  No pertinent surgical history  Summary   Pt presented as poorly responsive with open oral posture (very limited ability to close mouth today) with no purposeful oral activity when tsp of  ice cream and thick liquid placed into anterior oral cavity  No reflexive or volitional swallows noted this pm  Material removed via [address]  Oral care completed  Recommended Diet: NPO   Recommended Form of Meds: non-oral    Other Recommendations/Considerations: Continue attempts at swallowing evaluation/diagnostic tx  If status does not improve and pt remains inappropriate for oral diet, consider Palliative Care consult        Current Medical Status  Chapin John is a 70 y o  female w PMH of dementia, ambulatory dysfunction, ESRD, HTN, Sz d/o who presents with fall at [facility] dementia unit (pt just admitted there 7/3 days prior)       DX:  * Ambulatory dysfunction  Assessment & Plan  New resident at [facility], reportedly had unwitnessed fall today out of bed   Trauma imaging negative for acute fx  Consider infx etiology - CXR shows possible infiltrate but pt asx without fever, monitor off abx   PT/OT consults prior to return to dementia unit   Pt has dementia, unknown if there was true LOC   Consider possibility of breakthrough seizure - monitor closely   Fall precautions      Seizure [facility])  Assessment & Plan  Hx of, consider possibility of breakthrough sz   Continue keppra   sz precautions      Elevated troponin  Hypertensive urgency  Dementia (HCC)  Pt also with severe protein calorie malnutrition with BMI of 15 21  Swallowing Evaluation requested and completed bedside  Current Precautions:  Fall, Seizure,  Delirium    Past medical history:  Please see H&P for details    Special Studies of 7/5:   CXR of pm: Worsening right mid and lower lung pneumonia  Bilateral small effusions are new  CXR of am: Increased opacity in the right middle lobe with retraction of the minor fissure, at least in part due to atelectasis  Superimposed pneumonia cannot be excluded  Small left pleural effusion and left base atelectasis  Superimposed pneumonia cannot be excluded  CT of cervical spine: No cervical spine fracture or traumatic malalignment  Mild multilevel cervical degenerative changes most prominent at C4-5 and C5-6  CT of head: No acute intracranial process  No skull fracture  Chronic microangiopathy and small bifrontal cortical infarcts      Social/Education/Vocational Hx:  Pt moved to [facility] dementia unit 7/3  Swallow Information   Current Symptoms/Concerns: current R ML opacity and low body weight/protein dominique malnutrition  Current Diet: mechanically altered/level 2 diet, thin liquids and renal   Baseline Diet: unknown (unable to reach [employer] this pm)      Baseline Assessment   Behavior/Cognition: low alertness level, lying quietly in bed on arrival     Appearance: cachexic with sallow complexion  Speech/Language Status: not able to to follow commands, verbalized "yeah" on occasion  No other spontaneous or elicited speech  Patient Positioning: fully upright in bed with head supported by pillow to avoid neck hyperextension  Pain Status/Interventions/Response to Interventions:  No nonverbal indications of pain  Swallow Mechanism Exam: unable to follow commands  Mandible: mouth was in full open oral posture    With repeated instruction, pt close dher mouth on 1 of 5 trials  Labial: minimal movement on request, weak approximation x1  Lingual: offers resistance but no other movement elicited  Velum: dry c minimal eevation with stim  Dentition: ful natural dentition (  Vocal quality: mod reduced volume when stating "yeah" and "wet" vocal quality at times   Volitional Cough: unable to initiate volitional cough       Consistencies Assessed and Performance   Consistencies Administered: 1 tsp ice cream and 1 of nectar thick liquid    Oral Stage: profoundly impaired at this time  Mastication was adequate with the materials administered today  No retrieval, bolus formation or transfer  All material removed    Pharyngeal Stage: Unable to assess (no material reached pharynx)    Esophageal Concerns: NA at this time    Strategies and Efficacy: Pt not able to apply any strategies today    Summary and Recommendations (see above)    Results Reviewed with: RN     Treatment Recommended: continue evaluation/diagnostic tx as medically and clinically indicated          Goal:   -Establish POC re:  nutrition within 72 hrs   -Additional Goals to follow

## 2020-07-08 NOTE — ASSESSMENT & PLAN NOTE
- of the right mid/lower lobes in the setting of progressive dementia and associated dysphagia - confirmed on CXR imaging today  - appreciate speech/swallow input -> remains NPO  - initiated on IV Cefepime/Flagyl  - maximum temperature of a 102 1° last night - procalcitonin elevated 0 78 today (trend tomorrow) - blood cultures from 7/5 remain preliminarily negative - repeat cultures drawn today due to persistent fevers  - long-term prognosis currently poor -> palliative care to follow

## 2020-07-08 NOTE — MALNUTRITION/BMI
This medical record reflects one or more clinical indicators suggestive of malnutrition and underweight ; agree with malnutrition diagnosis as documented in  physician progress notes and per findings below:      Malnutrition Findings:   Malnutrition type: Chronic illness  Degree of Malnutrition: Other severe protein calorie malnutrition  Malnutrition Characteristics: Muscle loss, Fat loss(severe fat and muscle loss noted at temporals, orbitals, cheeks, clavicles and shoulder; tx with oral diet as pt can tolerate and nutrition supplements)    BMI Findings:  BMI Classifications: Underweight < 18 5     Body mass index is 15 21 kg/m²  See Nutrition note dated 7/8/20 for additional details  Completed nutrition assessment is viewable in the nutrition documentation

## 2020-07-08 NOTE — ASSESSMENT & PLAN NOTE
- resides at a locked dementia unit @ [facility]  - c/w Trazadone QHS  - delirium precautions  - appreciate geriatrics input  - appreciate speech evaluation -> recommending NPO for now  - will appreciate palliative care input regarding further goals of care as patient is clinically declining w/ lethargy and now worsening ability to swallow

## 2020-07-08 NOTE — PLAN OF CARE
Summary   Pt presented as poorly responsive with open oral posture (very limited ability to close mouth today) with no purposeful oral activity when tsp of  ice cream and thick liquid placed into anterior oral cavity  No reflexive or volitional swallows noted this pm  Material removed via [address] 42  Oral care completed  Recommended Diet: NPO   Recommended Form of Meds: non-oral    Other Recommendations/Considerations: Continue attempts at swallowing evaluation/diagnostic tx    If status does not improve and pt remains inappropriate for oral diet, consider Palliative Care consult

## 2020-07-08 NOTE — ASSESSMENT & PLAN NOTE
- unwitnessed fall out of bed at skilled facility  - CT of head and cervical spine unremarkable for hemorrhaging, fractures, or dislocations  - PT/OT as tolerated -> resides in a lock-down dementia unit however  - remains afebrile - CXR revealed a possible infiltrate but without upper respiratory symptoms -> monitor off antibiotics -> COVID-19 testing on 7/5 negative - urinalysis with trace pyuria but nitrite negative in the absence of urinary symptoms

## 2020-07-08 NOTE — PROGRESS NOTES
[address] [facility] NOTE   Praveena Zarate 70 y o  female MRN: [id number]  [facility] Encounter: [id number]  Reason for Consult: ESRD    ASSESSMENT and PLAN:    69 yo female with PMHx of ESRD, HTN, dementia who presents with fall from SNF facility  Nephrology on board for ESRD     1) ESRD     - ESRD - started on dialysis 2019 per daughter  - access is [facility]  - 7/8-dialysis held due to patient's poor clinical status  - reevaluate for dialysis 7/9  - keppra dosed after dialysis on dialysis days  - reviewed with the primary team attending regarding the patient's dialysis plans to hold today  Re-evaluate tomorrow   - daughter states that they are leaving to patient to continue or not continue dialysis  They understand if the patient is not able to make decisions are clinically worsens, they will readdress the dialysis  -daughter also wanted us to know regarding the patient's COVID test which was negative here but the patient had a floor mate that was positive for COVID day prior to admission     2) fall     - CT head unrevealing of acute process  - CT spine without acute fracture  Degenerative changes  - CXR with increased opacity - being monitored by Primary team  - CPK improving as of 7/6     3) dementia     - per Primary team     4) electrolytes - stable     5) acid/base - stable     6) MBD - monitor PTH as outpatient     7) HTN     - on amlodipine and IV metoprolol  - hold parameters on amlodipine     8) Anemia     - no RUFUS due to CVA chronic noted on CT head     9) thrombocytopenia - unclear etiology or chronicity - per Primary team    10) fevers    - possible aspiration PNA  - work up in progress per Primary team      SUBJECTIVE / INTERVAL HISTORY:    Blood pressure is 964-556 systolic  Fevers starting yesterday evening  Patient appears weak  Is not able to verbalize complaints  Appears volume depleted      OBJECTIVE:  Current Weight: Weight - Scale: 40 2 kg (88 lb 10 oz)  Vitals:    07/08/20 0600 07/08/20 0625 07/08/20 0714 07/08/20 1221   BP:  (!) 111/43 (!) 114/45 125/61   Pulse:  68 71 60   Resp:  20 20    Temp:  98 5 °F (36 9 °C)     TempSrc:       SpO2:  94% 95% 94%   Weight: 40 2 kg (88 lb 10 oz)      Height:           Intake/Output Summary (Last 24 hours) at 7/8/2020 1230  Last data filed at 7/7/2020 1900  Gross per 24 hour   Intake 340 ml   Output 67 ml   Net 273 ml     General: chronically ill appearing  Skin: no rash  Eyes: anicteric sclera  ENT: dry mucous membrane  Neck: supple  Chest: coarse breath sounds b/l  CVS: s1s2, no murmur, no gallop, no rub  Abdomen: soft, nontender, nl sounds  Extremities: no edema LE b/l  : no jerome  Neuro: AAOX1-2  Psych: normal affect    Medications:    Current Facility-Administered Medications:     acetaminophen (TYLENOL) tablet 975 mg, 975 mg, Oral, Q6H PRN, Kristina Kim PA-C    amLODIPine (NORVASC) tablet 2 5 mg, 2 5 mg, Oral, Daily, MD Lyla Skaggs complex-vitamin C-folic acid (NEPHROCAPS) capsule 1 capsule, 1 capsule, Oral, Daily With Dinner, Gordon Lau PA-C, 1 capsule at 07/05/20 1518    calcium carbonate (TUMS) chewable tablet 1,000 mg, 1,000 mg, Oral, Daily PRN, Lottie Zaman PA-C    cholestyramine sugar free (QUESTRAN LIGHT) packet 4 g, 4 g, Oral, BID, Deng Zaman PA-C    folic acid (FOLVITE) tablet 1 mg, 1 mg, Oral, Daily, Deng Zaman PA-C, 1 mg at 07/05/20 1518    heparin (porcine) subcutaneous injection 5,000 Units, 5,000 Units, Subcutaneous, Q8H [address], 5,000 Units at 07/08/20 0176 **AND** [CANCELED] Platelet count, , , Once, Deng Zaman PA-C    hydroxychloroquine (PLAQUENIL) tablet 200 mg, 200 mg, Oral, Daily With Breakfast, Deng Zaman PA-C    levETIRAcetam (KEPPRA) 500 mg in sodium chloride 0 9 % 100 mL IVPB, 500 mg, Intravenous, Once per day on Sun Tue Thu Sat, Deng Zaman PA-C, Stopped at 07/07/20 0958    levETIRAcetam (KEPPRA) 750 mg in sodium chloride 0 9 % 100 mL IVPB, 750 mg, Intravenous, Once per day on Mon Wed Fri, Mitchell MD Balta, Last Rate: 400 mL/hr at 07/06/20 0935, 750 mg at 07/06/20 0935    lidocaine (LIDODERM) 5 % patch 1 patch, 1 patch, Topical, Daily, Danna Anna DO, 1 patch at 07/08/20 1054    metoprolol (LOPRESSOR) injection 5 mg, 5 mg, Intravenous, Q6H, Deng Zaman PA-C, 5 mg at 07/07/20 1723    ondansetron (ZOFRAN) injection 4 mg, 4 mg, Intravenous, Q6H PRN, Deng Zaman PA-C    pantoprazole (PROTONIX) EC tablet 40 mg, 40 mg, Oral, Daily, JUANCARLOS Rivera-C, 40 mg at 07/05/20 1518    senna (SENOKOT) tablet 8 6 mg, 1 tablet, Oral, Daily, Deng Zaman PA-C, 8 6 mg at 07/05/20 1517    traZODone (DESYREL) tablet 50 mg, 50 mg, Oral, HS, Deng Zaman PA-C, 50 mg at 07/07/20 2224    Laboratory Results:  Results from last 7 days   Lab Units 07/08/20  0453 07/07/20  0446 07/06/20  0603 07/05/20  0911   WBC Thousand/uL 6 01  --  3 20* 2 79*   HEMOGLOBIN g/dL 10 3*  --  12 3 10 9*   HEMATOCRIT % 32 8*  --  39 2 34 4*   PLATELETS Thousands/uL 78*  --  88* 77*   POTASSIUM mmol/L 3 8 4 2 4 2 4 2   CHLORIDE mmol/L 104 103 102 102   CO2 mmol/L 26 25 24 29   BUN mg/dL 26* 17 40* 37*   CREATININE mg/dL 2 42* 1 41* 2 55* 2 59*   CALCIUM mg/dL 8 6 9 1 9 4 9 5   MAGNESIUM mg/dL  --   --  2 2  --

## 2020-07-09 LAB
ANION GAP SERPL CALCULATED.3IONS-SCNC: 8 MMOL/L (ref 4–13)
BASOPHILS # BLD AUTO: 0.01 THOUSANDS/ΜL (ref 0–0.1)
BASOPHILS NFR BLD AUTO: 0 % (ref 0–1)
BUN SERPL-MCNC: 32 MG/DL (ref 5–25)
CALCIUM SERPL-MCNC: 8.4 MG/DL (ref 8.3–10.1)
CHLORIDE SERPL-SCNC: 108 MMOL/L (ref 100–108)
CO2 SERPL-SCNC: 25 MMOL/L (ref 21–32)
CREAT SERPL-MCNC: 2.85 MG/DL (ref 0.6–1.3)
EOSINOPHIL # BLD AUTO: 0.06 THOUSAND/ΜL (ref 0–0.61)
EOSINOPHIL NFR BLD AUTO: 2 % (ref 0–6)
ERYTHROCYTE [DISTWIDTH] IN BLOOD BY AUTOMATED COUNT: 16 % (ref 11.6–15.1)
GFR SERPL CREATININE-BSD FRML MDRD: 16 ML/MIN/1.73SQ M
GLUCOSE SERPL-MCNC: 66 MG/DL (ref 65–140)
GLUCOSE SERPL-MCNC: 74 MG/DL (ref 65–140)
GLUCOSE SERPL-MCNC: 77 MG/DL (ref 65–140)
GLUCOSE SERPL-MCNC: 79 MG/DL (ref 65–140)
GLUCOSE SERPL-MCNC: 83 MG/DL (ref 65–140)
GLUCOSE SERPL-MCNC: 85 MG/DL (ref 65–140)
HCT VFR BLD AUTO: 32.9 % (ref 34.8–46.1)
HGB BLD-MCNC: 10.1 G/DL (ref 11.5–15.4)
IMM GRANULOCYTES # BLD AUTO: 0.01 THOUSAND/UL (ref 0–0.2)
IMM GRANULOCYTES NFR BLD AUTO: 0 % (ref 0–2)
LYMPHOCYTES # BLD AUTO: 0.87 THOUSANDS/ΜL (ref 0.6–4.47)
LYMPHOCYTES NFR BLD AUTO: 27 % (ref 14–44)
MCH RBC QN AUTO: 31.5 PG (ref 26.8–34.3)
MCHC RBC AUTO-ENTMCNC: 30.7 G/DL (ref 31.4–37.4)
MCV RBC AUTO: 103 FL (ref 82–98)
MONOCYTES # BLD AUTO: 0.23 THOUSAND/ΜL (ref 0.17–1.22)
MONOCYTES NFR BLD AUTO: 7 % (ref 4–12)
NEUTROPHILS # BLD AUTO: 2.01 THOUSANDS/ΜL (ref 1.85–7.62)
NEUTS SEG NFR BLD AUTO: 64 % (ref 43–75)
NRBC BLD AUTO-RTO: 0 /100 WBCS
PLATELET # BLD AUTO: 62 THOUSANDS/UL (ref 149–390)
PMV BLD AUTO: 10 FL (ref 8.9–12.7)
POTASSIUM SERPL-SCNC: 3.5 MMOL/L (ref 3.5–5.3)
PROCALCITONIN SERPL-MCNC: 0.71 NG/ML
RBC # BLD AUTO: 3.21 MILLION/UL (ref 3.81–5.12)
SODIUM SERPL-SCNC: 141 MMOL/L (ref 136–145)
WBC # BLD AUTO: 3.19 THOUSAND/UL (ref 4.31–10.16)

## 2020-07-09 PROCEDURE — 84145 PROCALCITONIN (PCT): CPT | Performed by: INTERNAL MEDICINE

## 2020-07-09 PROCEDURE — 92526 ORAL FUNCTION THERAPY: CPT

## 2020-07-09 PROCEDURE — 99232 SBSQ HOSP IP/OBS MODERATE 35: CPT | Performed by: INTERNAL MEDICINE

## 2020-07-09 PROCEDURE — 99223 1ST HOSP IP/OBS HIGH 75: CPT | Performed by: INTERNAL MEDICINE

## 2020-07-09 PROCEDURE — 85025 COMPLETE CBC W/AUTO DIFF WBC: CPT | Performed by: INTERNAL MEDICINE

## 2020-07-09 PROCEDURE — 80048 BASIC METABOLIC PNL TOTAL CA: CPT | Performed by: INTERNAL MEDICINE

## 2020-07-09 PROCEDURE — 82948 REAGENT STRIP/BLOOD GLUCOSE: CPT

## 2020-07-09 RX ORDER — DEXTROSE AND SODIUM CHLORIDE 5; .45 G/100ML; G/100ML
50 INJECTION, SOLUTION INTRAVENOUS CONTINUOUS
Status: DISCONTINUED | OUTPATIENT
Start: 2020-07-09 | End: 2020-07-09

## 2020-07-09 RX ORDER — DEXTROSE AND SODIUM CHLORIDE 5; .45 G/100ML; G/100ML
50 INJECTION, SOLUTION INTRAVENOUS CONTINUOUS
Status: DISPENSED | OUTPATIENT
Start: 2020-07-09 | End: 2020-07-09

## 2020-07-09 RX ADMIN — METOPROLOL TARTRATE 5 MG: 5 INJECTION INTRAVENOUS at 18:11

## 2020-07-09 RX ADMIN — LEVETIRACETAM 500 MG: 100 INJECTION, SOLUTION INTRAVENOUS at 11:03

## 2020-07-09 RX ADMIN — FOLIC ACID 1 MG: 1 TABLET ORAL at 10:57

## 2020-07-09 RX ADMIN — CEFEPIME HYDROCHLORIDE 1000 MG: 1 INJECTION, POWDER, FOR SOLUTION INTRAMUSCULAR; INTRAVENOUS at 20:25

## 2020-07-09 RX ADMIN — TRAZODONE HYDROCHLORIDE 50 MG: 50 TABLET ORAL at 21:32

## 2020-07-09 RX ADMIN — STANDARDIZED SENNA CONCENTRATE 8.6 MG: 8.6 TABLET ORAL at 10:57

## 2020-07-09 RX ADMIN — CHOLESTYRAMINE 4 G: 4 POWDER, FOR SUSPENSION ORAL at 10:57

## 2020-07-09 RX ADMIN — LIDOCAINE 1 PATCH: 50 PATCH TOPICAL at 11:06

## 2020-07-09 RX ADMIN — CHOLESTYRAMINE 4 G: 4 POWDER, FOR SUSPENSION ORAL at 18:11

## 2020-07-09 RX ADMIN — DEXTROSE AND SODIUM CHLORIDE 50 ML/HR: 5; .45 INJECTION, SOLUTION INTRAVENOUS at 00:23

## 2020-07-09 RX ADMIN — METOPROLOL TARTRATE 5 MG: 5 INJECTION INTRAVENOUS at 05:00

## 2020-07-09 RX ADMIN — METOPROLOL TARTRATE 5 MG: 5 INJECTION INTRAVENOUS at 23:00

## 2020-07-09 RX ADMIN — METOPROLOL TARTRATE 5 MG: 5 INJECTION INTRAVENOUS at 12:59

## 2020-07-09 RX ADMIN — HEPARIN SODIUM 5000 UNITS: 5000 INJECTION INTRAVENOUS; SUBCUTANEOUS at 21:32

## 2020-07-09 RX ADMIN — HYDROXYCHLOROQUINE SULFATE 200 MG: 200 TABLET, FILM COATED ORAL at 10:57

## 2020-07-09 RX ADMIN — METRONIDAZOLE 500 MG: 500 INJECTION, SOLUTION INTRAVENOUS at 13:00

## 2020-07-09 RX ADMIN — METRONIDAZOLE 500 MG: 500 INJECTION, SOLUTION INTRAVENOUS at 04:57

## 2020-07-09 RX ADMIN — Medication 1 CAPSULE: at 18:11

## 2020-07-09 RX ADMIN — HEPARIN SODIUM 5000 UNITS: 5000 INJECTION INTRAVENOUS; SUBCUTANEOUS at 05:00

## 2020-07-09 RX ADMIN — METRONIDAZOLE 500 MG: 500 INJECTION, SOLUTION INTRAVENOUS at 21:33

## 2020-07-09 NOTE — PROGRESS NOTES
[address] [facility] NOTE   Chapin John 70 y o  female MRN: [id number]  Unit/Bed#: [facility] 913-01 Encounter: [id number]  Reason for Consult: ESRD    ASSESSMENT and PLAN:    69 yo female with PMHx of ESRD, HTN, dementia who presents with fall from SNF facility  Nephrology on board for ESRD     1) ESRD     - ESRD - started on dialysis 2019 per daughter  - access is [facility]  - 7/8-dialysis held due to patient's poor clinical status  - 7/9 - no urgent indication for RRT  Give IVF    Plan:    - cont keppra dosed after dialysis on dialysis days  - reviewed with the primary team attending dialysis and IVF plans  - give short course IVF today also  - HD tomorrow     2) fall     - CT head unrevealing of acute process  - CT spine without acute fracture  Degenerative changes  - CXR with increased opacity - being monitored by Primary team  - CPK improving as of 7/6     3) dementia     - per Primary team     4) electrolytes - stable 7/9     5) acid/base - stable 7/9     6) MBD - monitor PTH as outpatient     7) HTN     - on IV metoprolol  - holding amlodipine     8) Anemia     - no RUFUS due to CVA chronic noted on CT head     9) thrombocytopenia - unclear etiology or chronicity - per Primary team     10) fevers     - possible aspiration PNA  - work up in progress per Primary team      SUBJECTIVE / INTERVAL HISTORY:    -153; afebrile this AM  Febrile yest  More awake today  Talking more today       OBJECTIVE:  Current Weight: Weight - Scale: 39 1 kg (86 lb 3 2 oz)  Vitals:    07/08/20 2222 07/09/20 0000 07/09/20 0600 07/09/20 0648   BP: 132/60   153/70   Pulse: 75 77  70   Resp: 20   18   Temp: 98 8 °F (37 1 °C)   98 2 °F (36 8 °C)   TempSrc:       SpO2: 99% 97%  96%   Weight:   39 1 kg (86 lb 3 2 oz)    Height:           Intake/Output Summary (Last 24 hours) at 7/9/2020 1223  Last data filed at 7/9/2020 0902  Gross per 24 hour   Intake 520 ml   Output 159 ml   Net 361 ml     General: chronically ill appearing  Skin: no rash  Eyes: anicteric sclera  ENT: dry mucous membrane  Neck: supple  Chest: CTA b/l, no ronchii, no wheeze, no rubs, no rales  CVS: s1s2, no murmur, no gallop, no rub  Abdomen: soft, nontender, nl sounds  Extremities: no edema LE b/l  : no jerome  Neuro: AAOX2  Psych: normal affect    Medications:    Current Facility-Administered Medications:     acetaminophen (TYLENOL) tablet 975 mg, 975 mg, Oral, Q6H PRN, RYAN Jean Baptiste complex-vitamin C-folic acid (NEPHROCAPS) capsule 1 capsule, 1 capsule, Oral, Daily With Dinner, Matthew Zaman PA-C, 1 capsule at 07/05/20 1518    calcium carbonate (TUMS) chewable tablet 1,000 mg, 1,000 mg, Oral, Daily PRN, Deng Zaman PA-C    cefepime (MAXIPIME) 1,000 mg in dextrose 5 % 50 mL IVPB, 1,000 mg, Intravenous, Q24H, Rigoberto Gaming MD, Last Rate: 100 mL/hr at 07/08/20 2155, 1,000 mg at 07/08/20 2155    cholestyramine sugar free (QUESTRAN LIGHT) packet 4 g, 4 g, Oral, BID, Deng Zaman PA-C, 4 g at 07/09/20 1057    dextrose 5 % and sodium chloride 0 45 % infusion, 50 mL/hr, Intravenous, Continuous, Rashi Rubin MD    folic acid (FOLVITE) tablet 1 mg, 1 mg, Oral, Daily, Deng Zaman PA-C, 1 mg at 07/09/20 1057    heparin (porcine) subcutaneous injection 5,000 Units, 5,000 Units, Subcutaneous, Q8H [address], 5,000 Units at 07/09/20 0500 **AND** [CANCELED] Platelet count, , , Once, Deng Zaman PA-C    hydrALAZINE (APRESOLINE) injection 5 mg, 5 mg, Intravenous, Q6H PRN, Rigoberot Gaming MD    hydroxychloroquine (PLAQUENIL) tablet 200 mg, 200 mg, Oral, Daily With Breakfast, Deng Zaman PA-C, 200 mg at 07/09/20 1057    levETIRAcetam (KEPPRA) 500 mg in sodium chloride 0 9 % 100 mL IVPB, 500 mg, Intravenous, Once per day on Sun Tue Thu Sat, Deng Zaman PA-C, Last Rate: 400 mL/hr at 07/09/20 1103, 500 mg at 07/09/20 1103    levETIRAcetam (KEPPRA) 750 mg in sodium chloride 0 9 % 100 mL IVPB, 750 mg, Intravenous, Once per day on Mon Wed Fri, Suzy Obrien MD, Last Rate: 400 mL/hr at 07/08/20 1645, 750 mg at 07/08/20 1645    lidocaine (LIDODERM) 5 % patch 1 patch, 1 patch, Topical, Daily, Len Johnson DO, 1 patch at 07/09/20 1106    metoprolol (LOPRESSOR) injection 5 mg, 5 mg, Intravenous, Q6H, Deng Zaman PA-C, 5 mg at 07/09/20 0500    metroNIDAZOLE (FLAGYL) IVPB (premix) 500 mg 100 mL, 500 mg, Intravenous, Q8H, Mio Maynard MD, Last Rate: 200 mL/hr at 07/09/20 0457, 500 mg at 07/09/20 0457    ondansetron (ZOFRAN) injection 4 mg, 4 mg, Intravenous, Q6H PRN, Deng Zaman PA-C    pantoprazole (PROTONIX) EC tablet 40 mg, 40 mg, Oral, Daily, JUANCARLOS Rivera-C, 40 mg at 07/09/20 1057    senna (SENOKOT) tablet 8 6 mg, 1 tablet, Oral, Daily, JUANCARLOS Rivera-C, 8 6 mg at 07/09/20 1057    traZODone (DESYREL) tablet 50 mg, 50 mg, Oral, HS, Deng Zaman PA-C, 50 mg at 07/07/20 2224    Laboratory Results:  Results from last 7 days   Lab Units 07/09/20  0606 07/08/20  0453 07/07/20  0446 07/06/20  0603 07/05/20  0911   WBC Thousand/uL 3 19* 6 01  --  3 20* 2 79*   HEMOGLOBIN g/dL 10 1* 10 3*  --  12 3 10 9*   HEMATOCRIT % 32 9* 32 8*  --  39 2 34 4*   PLATELETS Thousands/uL 62* 78*  --  88* 77*   POTASSIUM mmol/L 3 5 3 8 4 2 4 2 4 2   CHLORIDE mmol/L 108 104 103 102 102   CO2 mmol/L 25 26 25 24 29   BUN mg/dL 32* 26* 17 40* 37*   CREATININE mg/dL 2 85* 2 42* 1 41* 2 55* 2 59*   CALCIUM mg/dL 8 4 8 6 9 1 9 4 9 5   MAGNESIUM mg/dL  --   --   --  2 2  --

## 2020-07-09 NOTE — ASSESSMENT & PLAN NOTE
- unwitnessed fall out of bed at skilled facility  - CT of head and cervical spine unremarkable for hemorrhaging, fractures, or dislocations  - PT/OT as tolerated -> resides in a lock-down dementia unit however

## 2020-07-09 NOTE — ASSESSMENT & PLAN NOTE
- resides at a locked dementia unit @ [facility]  - c/w Trazadone QHS  - delirium precautions  - appreciate geriatrics input  - appreciate palliative care input regarding goals of care - family requesting continued antibiotic treatment for pneumonia and hemodialysis for the time being pending further clinical decline - remains a DNR/DNI

## 2020-07-09 NOTE — ASSESSMENT & PLAN NOTE
See Scanned Documents.   - monitor CBC and transfuse as necessary Detail Level: Detailed Post-Care Instructions: I reviewed with the patient in detail post-care instructions. Patient is to wear sunprotection, and avoid picking at any of the treated lesions. Pt may apply Vaseline to crusted or scabbing areas. Include Z78.9 (Other Specified Conditions Influencing Health Status) As An Associated Diagnosis?: No Consent: The patient's consent was obtained including but not limited to risks of crusting, scabbing, blistering, scarring, darker or lighter pigmentary change, recurrence, incomplete removal and infection. Render Post-Care Instructions In Note?: yes Medical Necessity Information: It is in your best interest to select a reason for this procedure from the list below. All of these items fulfill various CMS LCD requirements except the new and changing color options. Medical Necessity Clause: This procedure was medically necessary because the lesions that were treated were: Number Of Freeze-Thaw Cycles: 2 freeze-thaw cycles

## 2020-07-09 NOTE — ASSESSMENT & PLAN NOTE
- of the right mid/lower lobes in the setting of progressive dementia and associated dysphagia - confirmed on CXR imaging yesterday  - appreciate speech/swallow input -> upgrade to pureed diet with thin liquids today  - continue IV Cefepime/Flagyl  - COVID-19 testing on 7/5 negative  - afebrile today - procalcitonin mildly improved to 0 71 today (trend tomorrow) - blood cultures from 7/5 remain negative - repeat cultures drawn yesterday pain  - long-term prognosis currently poor -> palliative care input appreciated

## 2020-07-09 NOTE — PROGRESS NOTES
Midnight BS 66  Patient NPO for speech evaluation  [facility] with ATIF made aware  New  IVF order obtained  Will  Continue to monitor

## 2020-07-09 NOTE — SPEECH THERAPY NOTE
Speech Language/Pathology    Speech/Language Pathology Progress Note    Patient Name: Nelta Litten  CFSIM'B Date: 7/9/2020     Subjective:  Awake and alert  1:1 present at bedside  Pt hyperverbal today; non sensical most of the time  "They have needles all over in [place] and [place]"  Objective:  Pt seen for ongoing dysphagia assessment to assess po readiness  Current diet: NPO    Pt participated in oral mech exam demonstrating adequate labial and lingual strength and ROM  Mucosa was very dry, as pt has been NPO with open mouth breathing noted previously while lethargic  Provided ice chips x 3 initially  Pt chewed ice and swallowed promptly  Multiple swallows noted  Wet vocal quality was noted x 1 but cleared with cues  Pt required max cues to direct to task and quiet periodically while eating and drinking  During the course of a 45 minute tx session, pt took approximately 1 oz of applesauce including 1 bite with meds crushed and to drink approx 6 oz thin liquids via straw with max cues cues and encouragement  Bolus retrieval was good  Manipulation and transfer were timely  Swallow initiation appeared prompt but with suspected decreased rise  Suspect pharyngeal retention as multiple swallows per bolus noted  No overt s/s aspiration observed  Vocal quality remained clear  Pt refused solid foods offered  Stated she's had her "esophagus stretched more than anyone in [facility]"  Records unavailable  Pt stated she had procedures done in [place] and [place]  When asked how she knew when her esophagus needed to be dilated, she stated, "Well, I knew because I couldn't swallow!"     Pt stated she is a vegetarian and eats only soft foods - mostly soup  Attempted to call pt's daughters to confirm baselline diet and reported h/o esophageal dilations, but unable to reach anyone  Assessment:  Limited assessment due to pt verbosity/distractibility and at times paranoia   She tolerated limited samples of puree and thin liquids without overt s/s aspiration  Reports h/o possible stricture with dilation  Plan/Recommendations:  Initiate a pureed diet with thin liquids  Fully upright for all po  Aspiration Precautions  Reflux Precautions  Pt would likely benefit from further imaging by VBS + regular barium esophagram, however, unable to participate currently due to poor cooperation, paranoia, and distractibility  Consider GI consult due to pts reported history and c/o solid food dysphagia
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
## 2020-07-09 NOTE — PROGRESS NOTES
[facility] - Progress Note  Patient: Blanca Camacho 70 y o  female MRN: [id number]  Unit/Bed#: [facility] 913-01 Encounter: [id number]  Primary Care Provider: No primary care provider on file    Date Of Visit: 07/09/20        Assessment & Plan:    * Status post fall  Assessment & Plan  - unwitnessed fall out of bed at skilled facility  - CT of head and cervical spine unremarkable for hemorrhaging, fractures, or dislocations  - PT/OT as tolerated -> resides in a lock-down dementia unit however    Dementia  Assessment & Plan  - resides at a locked dementia unit @ [facility]  - c/w Trazadone QHS  - delirium precautions  - appreciate geriatrics input  - appreciate palliative care input regarding goals of care - family requesting continued antibiotic treatment for pneumonia and hemodialysis for the time being pending further clinical decline - remains a DNR/DNI    Aspiration pneumonia   Assessment & Plan  - of the right mid/lower lobes in the setting of progressive dementia and associated dysphagia - confirmed on CXR imaging yesterday  - appreciate speech/swallow input -> upgrade to pureed diet with thin liquids today  - continue IV Cefepime/Flagyl  - COVID-19 testing on 7/5 negative  - afebrile today - procalcitonin mildly improved to 0 71 today (trend tomorrow) - blood cultures from 7/5 remain negative - repeat cultures drawn yesterday pain  - long-term prognosis currently poor -> palliative care input appreciated    Acute rhabdomyolysis  Assessment & Plan  - secondary to fall  - CPK trend:  583 -> 526 -> 230 yesterday  - on IV fluids    ESRD (end stage renal disease)   Assessment & Plan  - continue routine hemodialysis per nephrology  - on Nephrocaps    Severe protein-calorie malnutrition  Assessment & Plan  - BMI of 15 97 in the setting of chronic illness coupled with decreased appetite/oral intake with evidence of muscle wasting/atrophy on exam  - on Nepro nutritional supplementation    Pancytopenia   Assessment & Plan  - monitor CBC and transfuse as necessary    Essential hypertension  Assessment & Plan  - continue Norvasc/Lopressor once tolerating oral diet  - PRN IV Hydralazine on board for BP spikes    Seizures  Assessment & Plan  - continue Keppra  - seizure precautions    Rheumatoid arthritis  Assessment & Plan  - continue Plaquenil    Elevated troponin  Assessment & Plan  - likely a non-MI troponin elevation in the setting of fall, coupled with accelerated blood pressure on admission and mild rhabdomyolysis, w/ history of ESRD  - troponin peak of 0 08  - echocardiogram revealed normal EF with no evidence of LV regional wall motion abnormalities      DVT Prophylaxis:  Heparin SC       Patient Centered Rounds:  I have performed bedside rounds and discussed plan of care with nursing today  Discussions with Specialists or Other Care Team Provider:  see above assessments if applicable    Education and Discussions with Family / Patient:  Patient at bedside, and palliative care discussed with daughter regarding goals of care - also discussed over the phone with significant other yesterday  Time Spent for Care:  32 minutes  More than 50% of total time spent on counseling and coordination of care as described above  Current Length of Stay: 4 day(s)    Current Patient Status: Inpatient   Certification Statement:  Patient will continue to require additional hospital stay due to assessments as noted above  Code Status: Level 3 - DNAR and DNI        Subjective:     Seen/examined earlier in the day  She is more alert and awake today, seemingly at her baseline dementia  Objective:     Vitals:   Temp (24hrs), Av 7 °F (36 5 °C), Min:96 2 °F (35 7 °C), Max:98 8 °F (37 1 °C)    Temp:  [96 2 °F (35 7 °C)-98 8 °F (37 1 °C)] 96 2 °F (35 7 °C)  HR:  [61-77] 68  Resp:  [16-20] 18  BP: (132-157)/(60-70) 152/67  SpO2:  [96 %-99 %] 99 %  Body mass index is 14 8 kg/m²  Input and Output Summary (last 24 hours): Intake/Output Summary (Last 24 hours) at 7/9/2020 1939  Last data filed at 7/9/2020 1451  Gross per 24 hour   Intake 255 83 ml   Output 159 ml   Net 96 83 ml       Physical Exam:     GENERAL:  Weak/fatigued - frail/cachectic  HEAD:  Normocephalic - atraumatic - temporal wasting noted  EYES: PERRL - EOMI   MOUTH:  Mucosa dry  NECK:  Supple - clavicular wasting evident  CARDIAC:  Rate controlled - S1/S2 positive  PULMONARY:  Diminished right-sided breath sounds - nonlabored respirations at rest  ABDOMEN:  Soft - nontender/nondistended - active bowel sounds  MUSCULOSKELETAL:  Motor strength/range of motion markedly deconditioned  NEUROLOGIC:  Alert/awake today - baseline demented  SKIN:  Chronic wrinkles/blemishes       Additional Data:     Labs & Recent Cultures:    Results from last 7 days   Lab Units 07/09/20  0606   WBC Thousand/uL 3 19*   HEMOGLOBIN g/dL 10 1*   HEMATOCRIT % 32 9*   PLATELETS Thousands/uL 62*   NEUTROS PCT % 64   LYMPHS PCT % 27   MONOS PCT % 7   EOS PCT % 2     Results from last 7 days   Lab Units 07/09/20  0606  07/05/20  0911   POTASSIUM mmol/L 3 5   < > 4 2   CHLORIDE mmol/L 108   < > 102   CO2 mmol/L 25   < > 29   BUN mg/dL 32*   < > 37*   CREATININE mg/dL 2 85*   < > 2 59*   CALCIUM mg/dL 8 4   < > 9 5   ALK PHOS U/L  --   --  129*   ALT U/L  --   --  54   AST U/L  --   --  70*    < > = values in this interval not displayed           Results from last 7 days   Lab Units 07/09/20  1757 07/09/20  1137 07/09/20  0602 07/09/20  0125 07/09/20  0002 07/08/20  1756 07/08/20  1054 07/08/20  0557 07/07/20  2349 07/07/20  1759 07/07/20  1706 07/07/20  1124   POC GLUCOSE mg/dl 85 83 77 74 66 88 92 85 87 167* 52* 86         Results from last 7 days   Lab Units 07/09/20  0652 07/08/20  1112 07/07/20  0447 07/06/20  0603 07/05/20  1109   LACTIC ACID mmol/L  --   --   --   --  1 5   PROCALCITONIN ng/ml 0 71* 0 78* 0 41* 0 32*  --          Results from last 7 days   Lab Units 07/08/20  1113 07/05/20  1940 07/05/20  1111 07/05/20  1109   BLOOD CULTURE  Received in Microbiology Lab  Culture in Progress  Received in Microbiology Lab  Culture in Progress  --  No Growth After 4 Days  No Growth After 4 Days  C DIFF TOXIN B   --  Negative  --   --          Last 24 Hours Medication List:     Current Facility-Administered Medications:  acetaminophen 975 mg Oral Q6H PRN Kristina Kim PA-C    b complex-vitamin C-folic acid 1 capsule Oral Daily With Deven Quinonez PA-C    calcium carbonate 1,000 mg Oral Daily PRN Beverly Zaman PA-C    cefepime 1,000 mg Intravenous Q24H Jeff Casanova MD Last Rate: 1,000 mg (07/08/20 2155)   cholestyramine sugar free 4 g Oral BID Deng Zaman PA-C    dextrose 5 % and sodium chloride 0 45 % 50 mL/hr Intravenous Continuous Ashley Thornton MD    folic acid 1 mg Oral Daily Deng Zaman PA-C    heparin (porcine) 5,000 Units Subcutaneous Q8H [address] Deng Zaman PA-C    hydrALAZINE 5 mg Intravenous Q6H PRN Jeff Casanova MD    hydroxychloroquine 200 mg Oral Daily With Breakfast Deng Zaman PA-C    levETIRAcetam 500 mg Intravenous Once per day on Sun Tue Thu Sat Beverly Zaman PA-C Last Rate: 500 mg (07/09/20 1103)   levETIRAcetam 750 mg Intravenous Once per day on Mon Wed Fri Ashley Thornotn MD Last Rate: 750 mg (07/08/20 1645)   lidocaine 1 patch Topical Daily Lindsey Flanagan DO    metoprolol 5 mg Intravenous Q6H Deng Zaman PA-C    metroNIDAZOLE 500 mg Intravenous Q8H Jeff Casanova MD Last Rate: 500 mg (07/09/20 1300)   ondansetron 4 mg Intravenous Q6H PRN Deng Zaman PA-C    pantoprazole 40 mg Oral Daily Deng Zaman PA-C    senna 1 tablet Oral Daily Deng Zaman PA-C    traZODone 50 mg Oral HS Deng Zaman PA-C                 ** Please Note: This note is constructed using a voice recognition dictation system    An occasional wrong word/phrase or sound-a-like substitution may have been picked up by dictation device due to the inherent limitations of voice recognition software  Read the chart carefully and recognize, using reasonable context, where substitutions may have occurred  **

## 2020-07-09 NOTE — CONSULTS
Consultation - [address] 70 y o  female MRN: [id number]  Unit/Bed#: [facility] 913-01 Encounter: [id number]      Assessment/Plan     Assessment:  Patient Active Problem List   Diagnosis    ESRD (end stage renal disease)     Status post fall    Dementia    Hypertensive urgency    Elevated troponin    Acute rhabdomyolysis    Seizures    CAD (coronary artery disease)    History of CVA (cerebrovascular accident)    Rheumatoid arthritis    Pancytopenia     Severe protein-calorie malnutrition    Essential hypertension    Aspiration pneumonia        Plan:  1  Symptom management: Geriatrics managing, appreciate recommendations  Ongoing delirium precautions    2  Goals of care: Level 3, DNR/DNI  Discussed with daughter- Imelda Juarez- over the phone  She has been in communication with patient's wife and they agree with following plan:   - attempt medical optimization with treatment of aspiration pneumonia   - Continue HD if she is able to tolerate   - Continue to try to optimize mentation   - If no improvement or worsening decline- plan for hospice (would like to wait on actual referral at this time )    3  Psychosocial support: Time spent providing supportive listening to daughter  History of Present Illness   Physician Requesting Consult: Franchesca Sorensen MD  Reason for Consult / Principal Problem: goals of care  Hx and PE limited by: patient cannot provide history  HPI: Cecelia James is a 70y o  year old female who presented after a fall at her memory care center  Patient has had a decline after a prolonged hospitalization last year that has resulted in ongoing HD needs  She never recovered to her baseline and "plateued"  She has been living with her wife but mental status has not recovered and has slowly gotten worse  As her care needs have increased the decision was made to put her in a Memory Unit   Unfortunately, since this occurred she has has worsening delusions and paranoia especially when she learned there was a COVID patient in her unit  She apparently fell on the day of admission  No acute intracranial process  She has since suffered from an aspiration pneumonia and is requiring a 1:1 sitter given her agitation  She is currently NPO from speech with ongoing work-up pending  She is well supported by her wife and daughter  Inpatient consult to Palliative Care  Consult performed by: Roddy Sheppard DO  Consult ordered by: Milady Christensen MD          Review of Systems   Unable to perform ROS: Dementia       Historical Information   Past Medical History:   Diagnosis Date    [facility] )     Epilepsy ([place] [address] )     Kidney disease     Seizure ([place] [address] )      History reviewed  No pertinent surgical history    E-Cigarette/Vaping    E-Cigarette Use Never User      E-Cigarette/Vaping Substances    Nicotine No     THC No     CBD No     Flavoring No     Other No     Unknown No      Social History     Socioeconomic History    Marital status: Unknown     Spouse name: None    Number of children: None    Years of education: None    Highest education level: None   Occupational History    None   Social Needs    Financial resource strain: None    Food insecurity:     Worry: None     Inability: None    Transportation needs:     Medical: None     Non-medical: None   Tobacco Use    Smoking status: Unknown If Ever Smoked   Substance and Sexual Activity    Alcohol use: Not Currently    Drug use: Never    Sexual activity: None   Lifestyle    Physical activity:     Days per week: None     Minutes per session: None    Stress: None   Relationships    Social connections:     Talks on phone: None     Gets together: None     Attends Hindu service: None     Active member of club or organization: None     Attends meetings of clubs or organizations: None     Relationship status: None    Intimate partner violence:     Fear of current or ex partner: None     Emotionally abused: None     Physically abused: None     Forced sexual activity: None   Other Topics Concern    None   Social History Narrative    None     History reviewed  No pertinent family history  Meds/Allergies   all current active meds have been reviewed and current meds:   Current Facility-Administered Medications   Medication Dose Route Frequency    acetaminophen (TYLENOL) tablet 975 mg  975 mg Oral Q6H PRN    b complex-vitamin C-folic acid (NEPHROCAPS) capsule 1 capsule  1 capsule Oral Daily With Dinner    calcium carbonate (TUMS) chewable tablet 1,000 mg  1,000 mg Oral Daily PRN    cefepime (MAXIPIME) 1,000 mg in dextrose 5 % 50 mL IVPB  1,000 mg Intravenous Q24H    cholestyramine sugar free (QUESTRAN LIGHT) packet 4 g  4 g Oral BID    folic acid (FOLVITE) tablet 1 mg  1 mg Oral Daily    heparin (porcine) subcutaneous injection 5,000 Units  5,000 Units Subcutaneous Q8H [address]    hydrALAZINE (APRESOLINE) injection 5 mg  5 mg Intravenous Q6H PRN    hydroxychloroquine (PLAQUENIL) tablet 200 mg  200 mg Oral Daily With Breakfast    levETIRAcetam (KEPPRA) 500 mg in sodium chloride 0 9 % 100 mL IVPB  500 mg Intravenous Once per day on Sun Tue Thu Sat    levETIRAcetam (KEPPRA) 750 mg in sodium chloride 0 9 % 100 mL IVPB  750 mg Intravenous Once per day on Mon Wed Fri    lidocaine (LIDODERM) 5 % patch 1 patch  1 patch Topical Daily    metoprolol (LOPRESSOR) injection 5 mg  5 mg Intravenous Q6H    metroNIDAZOLE (FLAGYL) IVPB (premix) 500 mg 100 mL  500 mg Intravenous Q8H    ondansetron (ZOFRAN) injection 4 mg  4 mg Intravenous Q6H PRN    pantoprazole (PROTONIX) EC tablet 40 mg  40 mg Oral Daily    senna (SENOKOT) tablet 8 6 mg  1 tablet Oral Daily    traZODone (DESYREL) tablet 50 mg  50 mg Oral HS       Palliative Care Medications: NA    No Known Allergies    Objective     Physical Exam   Constitutional: No distress  Frail and elderly   HENT:   Head: Normocephalic and atraumatic     Right Ear: External ear normal    Left Ear: External ear normal    Eyes: Right eye exhibits no discharge  Left eye exhibits no discharge  Cardiovascular: Normal rate  No murmur heard  Pulmonary/Chest: Effort normal  No respiratory distress  Abdominal: Soft  She exhibits no distension  Musculoskeletal: She exhibits no edema  Neurological:   Awake but nonsensical speech  Not oriented  Skin: There is pallor  Nursing note and vitals reviewed  Lab Results:   I have personally reviewed pertinent labs  , CBC:   Lab Results   Component Value Date    WBC 3 19 (L) 07/09/2020    HGB 10 1 (L) 07/09/2020    HCT 32 9 (L) 07/09/2020     (H) 07/09/2020    PLT 62 (L) 07/09/2020    MCH 31 5 07/09/2020    MCHC 30 7 (L) 07/09/2020    RDW 16 0 (H) 07/09/2020    MPV 10 0 07/09/2020    NRBC 0 07/09/2020   , CMP:   Lab Results   Component Value Date    SODIUM 141 07/09/2020    K 3 5 07/09/2020     07/09/2020    CO2 25 07/09/2020    BUN 32 (H) 07/09/2020    CREATININE 2 85 (H) 07/09/2020    CALCIUM 8 4 07/09/2020    EGFR 16 07/09/2020   , PT/PTT:No results found for: PT, PTT  Imaging Studies: I have personally reviewed pertinent reports  EKG, Pathology, and Other Studies: I have personally reviewed pertinent reports  Code Status: Level 3 - DNAR and DNI  Advance Directive and Living Will:      Power of :    POLST:      Counseling / Coordination of Care  Total floor / unit time spent today 55+ minutes  Greater than 50% of total time was spent with the patient and / or family counseling and / or coordination of care  A description of the counseling / coordination of care: chart review, medication review, discussion with patient's daughter

## 2020-07-09 NOTE — PLAN OF CARE
Assessment:  Limited assessment due to pt verbosity/distractibility and at times paranoia  She tolerated limited samples of puree and thin liquids without overt s/s aspiration  Reports h/o possible stricture with dilation  Plan/Recommendations:  Initiate a pureed diet with thin liquids  Fully upright for all po  Aspiration Precautions  Reflux Precautions  Pt would likely benefit from further imaging by VBS + regular barium esophagram, however, unable to participate currently due to poor cooperation, paranoia, and distractibility  Consider GI consult due to pts reported history and c/o solid food dysphagia

## 2020-07-10 ENCOUNTER — APPOINTMENT (INPATIENT)
Dept: DIALYSIS | Facility: HOSPITAL | Age: 75
DRG: 557 | End: 2020-07-10
Attending: INTERNAL MEDICINE
Payer: MEDICARE

## 2020-07-10 LAB
ANION GAP SERPL CALCULATED.3IONS-SCNC: 7 MMOL/L (ref 4–13)
BACTERIA BLD CULT: NORMAL
BACTERIA BLD CULT: NORMAL
BASOPHILS # BLD AUTO: 0.02 THOUSANDS/ΜL (ref 0–0.1)
BASOPHILS NFR BLD AUTO: 1 % (ref 0–1)
BUN SERPL-MCNC: 34 MG/DL (ref 5–25)
CALCIUM SERPL-MCNC: 9 MG/DL (ref 8.3–10.1)
CHLORIDE SERPL-SCNC: 111 MMOL/L (ref 100–108)
CO2 SERPL-SCNC: 23 MMOL/L (ref 21–32)
CREAT SERPL-MCNC: 2.95 MG/DL (ref 0.6–1.3)
EOSINOPHIL # BLD AUTO: 0.09 THOUSAND/ΜL (ref 0–0.61)
EOSINOPHIL NFR BLD AUTO: 3 % (ref 0–6)
ERYTHROCYTE [DISTWIDTH] IN BLOOD BY AUTOMATED COUNT: 16 % (ref 11.6–15.1)
GFR SERPL CREATININE-BSD FRML MDRD: 15 ML/MIN/1.73SQ M
GLUCOSE SERPL-MCNC: 116 MG/DL (ref 65–140)
GLUCOSE SERPL-MCNC: 74 MG/DL (ref 65–140)
GLUCOSE SERPL-MCNC: 88 MG/DL (ref 65–140)
GLUCOSE SERPL-MCNC: 93 MG/DL (ref 65–140)
HCT VFR BLD AUTO: 36.3 % (ref 34.8–46.1)
HGB BLD-MCNC: 10.8 G/DL (ref 11.5–15.4)
IMM GRANULOCYTES # BLD AUTO: 0.01 THOUSAND/UL (ref 0–0.2)
IMM GRANULOCYTES NFR BLD AUTO: 0 % (ref 0–2)
LYMPHOCYTES # BLD AUTO: 0.84 THOUSANDS/ΜL (ref 0.6–4.47)
LYMPHOCYTES NFR BLD AUTO: 28 % (ref 14–44)
MCH RBC QN AUTO: 31.8 PG (ref 26.8–34.3)
MCHC RBC AUTO-ENTMCNC: 29.8 G/DL (ref 31.4–37.4)
MCV RBC AUTO: 107 FL (ref 82–98)
MONOCYTES # BLD AUTO: 0.19 THOUSAND/ΜL (ref 0.17–1.22)
MONOCYTES NFR BLD AUTO: 6 % (ref 4–12)
NEUTROPHILS # BLD AUTO: 1.9 THOUSANDS/ΜL (ref 1.85–7.62)
NEUTS SEG NFR BLD AUTO: 62 % (ref 43–75)
NRBC BLD AUTO-RTO: 0 /100 WBCS
PLATELET # BLD AUTO: 62 THOUSANDS/UL (ref 149–390)
PMV BLD AUTO: 9.8 FL (ref 8.9–12.7)
POTASSIUM SERPL-SCNC: 4.9 MMOL/L (ref 3.5–5.3)
PROCALCITONIN SERPL-MCNC: 0.5 NG/ML
RBC # BLD AUTO: 3.4 MILLION/UL (ref 3.81–5.12)
SODIUM SERPL-SCNC: 141 MMOL/L (ref 136–145)
WBC # BLD AUTO: 3.05 THOUSAND/UL (ref 4.31–10.16)

## 2020-07-10 PROCEDURE — 84145 PROCALCITONIN (PCT): CPT | Performed by: INTERNAL MEDICINE

## 2020-07-10 PROCEDURE — 5A1D70Z PERFORMANCE OF URINARY FILTRATION, INTERMITTENT, LESS THAN 6 HOURS PER DAY: ICD-10-PCS | Performed by: INTERNAL MEDICINE

## 2020-07-10 PROCEDURE — 85025 COMPLETE CBC W/AUTO DIFF WBC: CPT | Performed by: INTERNAL MEDICINE

## 2020-07-10 PROCEDURE — 99232 SBSQ HOSP IP/OBS MODERATE 35: CPT | Performed by: INTERNAL MEDICINE

## 2020-07-10 PROCEDURE — 82948 REAGENT STRIP/BLOOD GLUCOSE: CPT

## 2020-07-10 PROCEDURE — 99233 SBSQ HOSP IP/OBS HIGH 50: CPT | Performed by: INTERNAL MEDICINE

## 2020-07-10 PROCEDURE — 80048 BASIC METABOLIC PNL TOTAL CA: CPT | Performed by: INTERNAL MEDICINE

## 2020-07-10 RX ORDER — PANTOPRAZOLE SODIUM 40 MG/1
40 INJECTION, POWDER, FOR SOLUTION INTRAVENOUS
Status: DISCONTINUED | OUTPATIENT
Start: 2020-07-11 | End: 2020-07-14

## 2020-07-10 RX ADMIN — TRAZODONE HYDROCHLORIDE 50 MG: 50 TABLET ORAL at 21:08

## 2020-07-10 RX ADMIN — HEPARIN SODIUM 5000 UNITS: 5000 INJECTION INTRAVENOUS; SUBCUTANEOUS at 13:02

## 2020-07-10 RX ADMIN — METOPROLOL TARTRATE 5 MG: 5 INJECTION INTRAVENOUS at 12:50

## 2020-07-10 RX ADMIN — CHOLESTYRAMINE 4 G: 4 POWDER, FOR SUSPENSION ORAL at 12:53

## 2020-07-10 RX ADMIN — LIDOCAINE 1 PATCH: 50 PATCH TOPICAL at 12:52

## 2020-07-10 RX ADMIN — CEFEPIME HYDROCHLORIDE 1000 MG: 1 INJECTION, POWDER, FOR SOLUTION INTRAMUSCULAR; INTRAVENOUS at 21:00

## 2020-07-10 RX ADMIN — CHOLESTYRAMINE 4 G: 4 POWDER, FOR SUSPENSION ORAL at 17:30

## 2020-07-10 RX ADMIN — PANTOPRAZOLE SODIUM 40 MG: 40 TABLET, DELAYED RELEASE ORAL at 12:51

## 2020-07-10 RX ADMIN — HEPARIN SODIUM 5000 UNITS: 5000 INJECTION INTRAVENOUS; SUBCUTANEOUS at 05:40

## 2020-07-10 RX ADMIN — METRONIDAZOLE 500 MG: 500 INJECTION, SOLUTION INTRAVENOUS at 12:52

## 2020-07-10 RX ADMIN — STANDARDIZED SENNA CONCENTRATE 8.6 MG: 8.6 TABLET ORAL at 12:50

## 2020-07-10 RX ADMIN — METRONIDAZOLE 500 MG: 500 INJECTION, SOLUTION INTRAVENOUS at 21:49

## 2020-07-10 RX ADMIN — HEPARIN SODIUM 5000 UNITS: 5000 INJECTION INTRAVENOUS; SUBCUTANEOUS at 21:08

## 2020-07-10 RX ADMIN — HYDROXYCHLOROQUINE SULFATE 200 MG: 200 TABLET, FILM COATED ORAL at 13:12

## 2020-07-10 RX ADMIN — FOLIC ACID 1 MG: 1 TABLET ORAL at 12:51

## 2020-07-10 RX ADMIN — METOPROLOL TARTRATE 5 MG: 5 INJECTION INTRAVENOUS at 17:30

## 2020-07-10 RX ADMIN — METRONIDAZOLE 500 MG: 500 INJECTION, SOLUTION INTRAVENOUS at 05:41

## 2020-07-10 RX ADMIN — METOPROLOL TARTRATE 5 MG: 5 INJECTION INTRAVENOUS at 05:40

## 2020-07-10 RX ADMIN — LEVETIRACETAM 750 MG: 100 INJECTION, SOLUTION INTRAVENOUS at 14:19

## 2020-07-10 NOTE — PROGRESS NOTES
[facility] - Progress Note  Patient: Nelta Litten 70 y o  female MRN: [id number]  [facility] Encounter: [id number]  Primary Care Provider: No primary care provider on file    Date Of Visit: 07/10/20        Assessment & Plan:    * Status post fall  Assessment & Plan  - unwitnessed fall out of bed at skilled facility  - CT of head and cervical spine unremarkable for hemorrhaging, fractures, or dislocations  - PT/OT as tolerated -> resides in a lock-down dementia unit however    Dementia  Assessment & Plan  - resides at a locked dementia unit @ [facility]  - c/w Trazadone QHS  - delirium precautions  - appreciate geriatrics input  - appreciate palliative care input regarding goals of care - family requesting continued antibiotic treatment for pneumonia and hemodialysis for the time being pending further clinical decline - remains a DNR/DNI    Acute rhabdomyolysis  Assessment & Plan  - secondary to fall  - CPK trend:  583 -> 526 -> 230 on 7/8 (trend tomorrow)  - now off IV fluids    ESRD (end stage renal disease)   Assessment & Plan  - continue routine hemodialysis per nephrology  - on Nephrocaps    Severe protein-calorie malnutrition  Assessment & Plan  - BMI of 15 97 in the setting of chronic illness coupled with decreased appetite/oral intake with evidence of muscle wasting/atrophy on exam  - on Nepro nutritional supplementation    Pancytopenia   Assessment & Plan  - monitor CBC and transfuse as necessary    Essential hypertension  Assessment & Plan  - resume PO Norvasc/Lopressor once consistently tolerating oral diet - c/w IV Lopressor Q6h for now  - PRN IV Hydralazine additionally on board for BP spikes    Seizures  Assessment & Plan  - continue Keppra  - seizure precautions    Rheumatoid arthritis  Assessment & Plan  - continue Plaquenil    Elevated troponin  Assessment & Plan  - likely a non-MI troponin elevation in the setting of fall, coupled with accelerated blood pressure on admission and mild rhabdomyolysis, w/ history of ESRD  - troponin peak of 0 08  - echocardiogram revealed normal EF with no evidence of LV regional wall motion abnormalities      DVT Prophylaxis:  Heparin SC      Patient Centered Rounds:  I have performed bedside rounds and discussed plan of care with nursing today  Discussions with Specialists or Other Care Team Provider:  see above assessments if applicable    Education and Discussions with Family / Patient:  Daughter/significant aware of plan    Time Spent for Care:  32 minutes  More than 50% of total time spent on counseling and coordination of care as described above  Current Length of Stay: 5 day(s)    Current Patient Status: Inpatient   Certification Statement:  Patient will continue to require additional hospital stay due to assessments as noted above  Code Status: Level 3 - DNAR and DNI        Subjective:     Seen/examined earlier in the day while undergoing hemodialysis  She is alert/awake and at her baseline dementia status  Objective:     Vitals:   Temp (24hrs), Av 5 °F (36 4 °C), Min:97 °F (36 1 °C), Max:98 °F (36 7 °C)    Temp:  [97 °F (36 1 °C)-98 °F (36 7 °C)] 97 5 °F (36 4 °C)  HR:  [] 65  Resp:  [16-18] 18  BP: (111-178)/(68-94) 150/69  SpO2:  [97 %-99 %] 99 %  Body mass index is 15 57 kg/m²  Input and Output Summary (last 24 hours):        Intake/Output Summary (Last 24 hours) at 7/10/2020 1828  Last data filed at 7/10/2020 1423  Gross per 24 hour   Intake 920 ml   Output 730 ml   Net 190 ml       Physical Exam:     GENERAL:  Weak/fatigued - frail/cachectic  HEAD:  Normocephalic - atraumatic - temporal wasting evident  EYES: PERRL - EOMI   MOUTH:  Mucosa moist  NECK:  Supple - full range of motion - clavicular wasting noted  CARDIAC:  Rate controlled - S1/S2 positive  PULMONARY:  Diminished at the bases - nonlabored respirations at rest  ABDOMEN:  Soft - nontender/nondistended - active bowel sounds  MUSCULOSKELETAL:  Motor strength/range of motion deconditioned  NEUROLOGIC:  Baseline demented  SKIN:  Age-appropriate wrinkles/blemishes   PSYCHIATRIC:  Chronic wrinkles/blemishes      Additional Data:     Labs & Recent Cultures:    Results from last 7 days   Lab Units 07/10/20  0653   WBC Thousand/uL 3 05*   HEMOGLOBIN g/dL 10 8*   HEMATOCRIT % 36 3   PLATELETS Thousands/uL 62*   NEUTROS PCT % 62   LYMPHS PCT % 28   MONOS PCT % 6   EOS PCT % 3     Results from last 7 days   Lab Units 07/10/20  0653  07/05/20  0911   POTASSIUM mmol/L 4 9   < > 4 2   CHLORIDE mmol/L 111*   < > 102   CO2 mmol/L 23   < > 29   BUN mg/dL 34*   < > 37*   CREATININE mg/dL 2 95*   < > 2 59*   CALCIUM mg/dL 9 0   < > 9 5   ALK PHOS U/L  --   --  129*   ALT U/L  --   --  54   AST U/L  --   --  70*    < > = values in this interval not displayed  Results from last 7 days   Lab Units 07/10/20  1806 07/10/20  0728 07/10/20  0027 07/09/20  1757 07/09/20  1137 07/09/20  0602 07/09/20  0125 07/09/20  0002 07/08/20  1756 07/08/20  1054 07/08/20  0557 07/07/20  2349   POC GLUCOSE mg/dl 116 74 93 85 83 77 74 66 88 92 85 87         Results from last 7 days   Lab Units 07/10/20  0653 07/09/20  0652 07/08/20  1112 07/07/20  0447 07/06/20  0603 07/05/20  1109   LACTIC ACID mmol/L  --   --   --   --   --  1 5   PROCALCITONIN ng/ml 0 50* 0 71* 0 78* 0 41* 0 32*  --          Results from last 7 days   Lab Units 07/08/20  1113 07/05/20  1940 07/05/20  1111 07/05/20  1109   BLOOD CULTURE  No Growth at 24 hrs  No Growth at 24 hrs   --  No Growth After 5 Days  No Growth After 5 Days     C DIFF TOXIN B   --  Negative  --   --          Last 24 Hours Medication List:     Current Facility-Administered Medications:  acetaminophen 975 mg Oral Q6H PRN Kristina Kim PA-C    b complex-vitamin C-folic acid 1 capsule Oral Daily With Dinner Deng Zaman PA-C    calcium carbonate 1,000 mg Oral Daily PRN Deng Zaman PA-C    cefepime 1,000 mg Intravenous Q24H Mimi Pratt MD Last Rate: 1,000 mg (07/09/20 2025)   cholestyramine sugar free 4 g Oral BID Deng Zaman PA-C    folic acid 1 mg Oral Daily Deng Zaman PA-C    heparin (porcine) 5,000 Units Subcutaneous Q8H [address] Deng Zaman PA-C    hydrALAZINE 5 mg Intravenous Q6H PRN Mimi Pratt MD    hydroxychloroquine 200 mg Oral Daily With Breakfast Deng Zaman PA-C    levETIRAcetam 500 mg Intravenous Once per day on Sun Tue Thu Sat Whit Nieves Zaman PA-C Last Rate: 500 mg (07/09/20 1103)   levETIRAcetam 750 mg Intravenous Once per day on Mon Wed Fri Pradeep Elder MD Last Rate: Stopped (07/10/20 1801)   lidocaine 1 patch Topical Daily Marcy Peraza, DO    metoprolol 5 mg Intravenous Q6H Deng Zaman PA-C    metroNIDAZOLE 500 mg Intravenous Q8H Mimi Pratt MD Last Rate: Stopped (07/10/20 1418)   ondansetron 4 mg Intravenous Q6H PRN Deng Zaman PA-C    [START ON 7/11/2020] pantoprazole 40 mg Intravenous Q24H [address] Mimi Pratt MD    senna 1 tablet Oral Daily Deng Zaman PA-C    traZODone 50 mg Oral HS Deng Zaman PA-C                   ** Please Note: This note is constructed using a voice recognition dictation system  An occasional wrong word/phrase or sound-a-like substitution may have been picked up by dictation device due to the inherent limitations of voice recognition software  Read the chart carefully and recognize, using reasonable context, where substitutions may have occurred  **

## 2020-07-10 NOTE — ASSESSMENT & PLAN NOTE
- resume PO Norvasc/Lopressor once consistently tolerating oral diet - c/w IV Lopressor Q6h for now  - PRN IV Hydralazine additionally on board for BP spikes

## 2020-07-10 NOTE — PROGRESS NOTES
[address] [facility] NOTE   Blanca Camacho 70 y o  female MRN: [id number]  [facility] Encounter: [id number]  Reason for Consult: ESRD    ASSESSMENT and PLAN:     71 yo female with PMHx of ESRD, HTN, dementia who presents with fall from SNF facility  Nephrology on board for ESRD     1) ESRD     - ESRD - started on dialysis 2019 per daughter  - access is [facility]  - 7/8-dialysis held due to patient's poor clinical status  - 7/9 - no urgent indication for RRT  Give IVF  - 7/10 - HD with no UF     Plan:     - cont keppra dosed after dialysis on dialysis days  - reviewed with the primary team attending this AM  - HD today - seen on dialysis - 3 5 hours; Na 140; bicarb 35; F160; ; no UF  - give short course IVF if oral intake decreases (today pt ate 75% of breakfast which is improvement)  - HD next on monday     2) fall     - CT head unrevealing of acute process  - CT spine without acute fracture  Degenerative changes  - CXR with increased opacity - being monitored by Primary team  - CPK improving as of 7/6     3) dementia     - per Primary team     4) electrolytes - stable 7/10     5) acid/base - stable 7/10     6) MBD - monitor PTH as outpatient     7) HTN     - on IV metoprolol  - holding amlodipine     8) Anemia     - no RUFUS due to CVA chronic noted on CT head     9) thrombocytopenia - unclear etiology or chronicity - per Primary team     10) fevers     - possible aspiration PNA  - work up in progress per Primary team    SUBJECTIVE / INTERVAL HISTORY:    Pt seen on dialysis  Tolerating treatment  Denies complaints but is confused       OBJECTIVE:  Current Weight: Weight - Scale: 41 1 kg (90 lb 11 2 oz)  Vitals:    07/10/20 1100 07/10/20 1130 07/10/20 1200 07/10/20 1210   BP: 111/91 156/94 147/85 (!) 178/88   Pulse: 98 60 64 65   Resp: 18 18 18 18   Temp:    98 °F (36 7 °C)   TempSrc:       SpO2:       Weight:       Height:           Intake/Output Summary (Last 24 hours) at 7/10/2020 1351  Last data filed at 7/10/2020 1210  Gross per 24 hour   Intake 1200 ml   Output 500 ml   Net 700 ml     General: chronically ill appearing  Skin: no rash  Eyes: anicteric sclera  ENT: dry mucous membrane  Neck: supple  Chest: CTA b/l, no ronchii, no wheeze, no rubs, no rales  CVS: s1s2, no murmur, no gallop, no rub  Abdomen: soft, nontender, nl sounds  Extremities: no edema LE b/l  : no jerome  Neuro: AAOX2  Psych: normal affect      Medications:    Current Facility-Administered Medications:     acetaminophen (TYLENOL) tablet 975 mg, 975 mg, Oral, Q6H PRN, RYAN Jean Baptiste complex-vitamin C-folic acid (NEPHROCAPS) capsule 1 capsule, 1 capsule, Oral, Daily With Dinner, Claudia Zaman PA-C, 1 capsule at 07/09/20 1811    calcium carbonate (TUMS) chewable tablet 1,000 mg, 1,000 mg, Oral, Daily PRN, Deng Zaman PA-C    cefepime (MAXIPIME) 1,000 mg in dextrose 5 % 50 mL IVPB, 1,000 mg, Intravenous, Q24H, Fredi Ramos MD, Last Rate: 100 mL/hr at 07/09/20 2025, 1,000 mg at 07/09/20 2025    cholestyramine sugar free (QUESTRAN LIGHT) packet 4 g, 4 g, Oral, BID, Deng Zaman PA-C, 4 g at 00/68/60 7525    folic acid (FOLVITE) tablet 1 mg, 1 mg, Oral, Daily, Deng Zaman PA-C, 1 mg at 07/10/20 1251    heparin (porcine) subcutaneous injection 5,000 Units, 5,000 Units, Subcutaneous, Q8H [address], 5,000 Units at 07/10/20 1302 **AND** [CANCELED] Platelet count, , , Once, Deng Zaman PA-C    hydrALAZINE (APRESOLINE) injection 5 mg, 5 mg, Intravenous, Q6H PRN, Fredi Ramos MD    hydroxychloroquine (PLAQUENIL) tablet 200 mg, 200 mg, Oral, Daily With Breakfast, Deng Zaman PA-C, 200 mg at 07/10/20 1312    levETIRAcetam (KEPPRA) 500 mg in sodium chloride 0 9 % 100 mL IVPB, 500 mg, Intravenous, Once per day on Sun Tue Thu Sat, Deng Zaman PA-C, Last Rate: 400 mL/hr at 07/09/20 1103, 500 mg at 07/09/20 1103    levETIRAcetam (KEPPRA) 750 mg in sodium chloride 0 9 % 100 mL IVPB, 750 mg, Intravenous, Once per day on Mon Wed Fri, Pradeep Elder MD, Last Rate: 400 mL/hr at 07/08/20 1645, 750 mg at 07/08/20 1645    lidocaine (LIDODERM) 5 % patch 1 patch, 1 patch, Topical, Daily, Marcy Peraza DO, 1 patch at 07/10/20 1252    metoprolol (LOPRESSOR) injection 5 mg, 5 mg, Intravenous, Q6H, Deng Zaman PA-C, 5 mg at 07/10/20 1250    metroNIDAZOLE (FLAGYL) IVPB (premix) 500 mg 100 mL, 500 mg, Intravenous, Q8H, Mimi Pratt MD, Last Rate: 200 mL/hr at 07/10/20 1252, 500 mg at 07/10/20 1252    ondansetron (ZOFRAN) injection 4 mg, 4 mg, Intravenous, Q6H PRN, Deng Zaman PA-C    pantoprazole (PROTONIX) EC tablet 40 mg, 40 mg, Oral, Daily, JEAN-CLAUDE RiveraC, 40 mg at 07/10/20 1251    senna (SENOKOT) tablet 8 6 mg, 1 tablet, Oral, Daily, JUANCARLOS Rivera-C, 8 6 mg at 07/10/20 1250    traZODone (DESYREL) tablet 50 mg, 50 mg, Oral, HS, JUANCARLOS Rivera-C, 50 mg at 07/09/20 2132    Laboratory Results:  Results from last 7 days   Lab Units 07/10/20  0653 07/09/20  0606 07/08/20  0453 07/07/20  0446 07/06/20  0603 07/05/20  0911   WBC Thousand/uL 3 05* 3 19* 6 01  --  3 20* 2 79*   HEMOGLOBIN g/dL 10 8* 10 1* 10 3*  --  12 3 10 9*   HEMATOCRIT % 36 3 32 9* 32 8*  --  39 2 34 4*   PLATELETS Thousands/uL 62* 62* 78*  --  88* 77*   POTASSIUM mmol/L 4 9 3 5 3 8 4 2 4 2 4 2   CHLORIDE mmol/L 111* 108 104 103 102 102   CO2 mmol/L 23 25 26 25 24 29   BUN mg/dL 34* 32* 26* 17 40* 37*   CREATININE mg/dL 2 95* 2 85* 2 42* 1 41* 2 55* 2 59*   CALCIUM mg/dL 9 0 8 4 8 6 9 1 9 4 9 5   MAGNESIUM mg/dL  --   --   --   --  2 2  --

## 2020-07-10 NOTE — SOCIAL WORK
Family requested information on how to request and submit a absentee vote  No family at bedside so the information was left of review

## 2020-07-10 NOTE — ASSESSMENT & PLAN NOTE
- resides at a locked dementia unit @ [facility]  - c/w Trazadone QHS  - delirium precautions  - appreciate geriatrics input  - appreciate palliative care input regarding goals of care - family requesting continued antibiotic treatment for pneumonia and hemodialysis for the time being pending further clinical decline - remains a DNR/DNI

## 2020-07-10 NOTE — PLAN OF CARE
Pre-tx:  Run even today per order  Plan of care discussed with Nephrologist Tish Finney and patient  Post-tx:  Pt completed 3 5 hr tx per order  Ran even today, post weight 40 5 kg  Pt tolerated tx well  1:1 present throughout tx  Report given to primary RN         Problem: METABOLIC, FLUID AND ELECTROLYTES - ADULT  Goal: Electrolytes maintained within normal limits  Description  INTERVENTIONS:  - Monitor labs and assess patient for signs and symptoms of electrolyte imbalances  - Administer electrolyte replacement as ordered  - Monitor response to electrolyte replacements, including repeat lab results as appropriate  - Instruct patient on fluid and nutrition as appropriate  Outcome: Progressing  Goal: Fluid balance maintained  Description  INTERVENTIONS:  - Monitor labs   - Monitor I/O and WT  - Instruct patient on fluid and nutrition as appropriate  - Assess for signs & symptoms of volume excess or deficit  Outcome: Progressing

## 2020-07-10 NOTE — PLAN OF CARE
Problem: Prexisting or High Potential for Compromised Skin Integrity  Goal: Skin integrity is maintained or improved  Description  INTERVENTIONS:  - Identify patients at risk for skin breakdown  - Assess and monitor skin integrity  - Assess and monitor nutrition and hydration status  - Monitor labs   - Assess for incontinence   - Turn and reposition patient  - Assist with mobility/ambulation  - Relieve pressure over bony prominences  - Avoid friction and shearing  - Provide appropriate hygiene as needed including keeping skin clean and dry  - Evaluate need for skin moisturizer/barrier cream  - Collaborate with interdisciplinary team   - Patient/family teaching  - Consider wound care consult   Outcome: Progressing     Problem: Potential for Falls  Goal: Patient will remain free of falls  Description  INTERVENTIONS:  - Assess patient frequently for physical needs  -  Identify cognitive and physical deficits and behaviors that affect risk of falls    -  [place] fall precautions as indicated by assessment   - Educate patient/family on patient safety including physical limitations  - Instruct patient to call for assistance with activity based on assessment  - Modify environment to reduce risk of injury  - Consider OT/PT consult to assist with strengthening/mobility  Outcome: Progressing     Problem: PAIN - ADULT  Goal: Verbalizes/displays adequate comfort level or baseline comfort level  Description  Interventions:  - Encourage patient to monitor pain and request assistance  - Assess pain using appropriate pain scale  - Administer analgesics based on type and severity of pain and evaluate response  - Implement non-pharmacological measures as appropriate and evaluate response  - Consider cultural and social influences on pain and pain management  - Notify physician/advanced practitioner if interventions unsuccessful or patient reports new pain  Outcome: Progressing     Problem: SAFETY ADULT  Goal: Maintain or return to baseline ADL function  Description  INTERVENTIONS:  -  Assess patient's ability to carry out ADLs; assess patient's baseline for ADL function and identify physical deficits which impact ability to perform ADLs (bathing, care of mouth/teeth, toileting, grooming, dressing, etc )  - Assess/evaluate cause of self-care deficits   - Assess range of motion  - Assess patient's mobility; develop plan if impaired  - Assess patient's need for assistive devices and provide as appropriate  - Encourage maximum independence but intervene and supervise when necessary  - Involve family in performance of ADLs  - Assess for home care needs following discharge   - Consider OT consult to assist with ADL evaluation and planning for discharge  - Provide patient education as appropriate  Outcome: Progressing  Goal: Maintain or return mobility status to optimal level  Description  INTERVENTIONS:  - Assess patient's baseline mobility status (ambulation, transfers, stairs, etc )    - Identify cognitive and physical deficits and behaviors that affect mobility  - Identify mobility aids required to assist with transfers and/or ambulation (gait belt, sit-to-stand, lift, walker, cane, etc )  - [place] fall precautions as indicated by assessment  - Record patient progress and toleration of activity level on Mobility SBAR; progress patient to next Phase/Stage  - Instruct patient to call for assistance with activity based on assessment  - Consider rehabilitation consult to assist with strengthening/weightbearing, etc   Outcome: Progressing     Problem: DISCHARGE PLANNING  Goal: Discharge to home or other facility with appropriate resources  Description  INTERVENTIONS:  - Identify barriers to discharge w/patient and caregiver  - Arrange for needed discharge resources and transportation as appropriate  - Identify discharge learning needs (meds, wound care, etc )  - Arrange for interpretive services to assist at discharge as needed  - Refer to Case Management Department for coordinating discharge planning if the patient needs post-hospital services based on physician/advanced practitioner order or complex needs related to functional status, cognitive ability, or social support system  Outcome: Progressing     Problem: Knowledge Deficit  Goal: Patient/family/caregiver demonstrates understanding of disease process, treatment plan, medications, and discharge instructions  Description  Complete learning assessment and assess knowledge base  Interventions:  - Provide teaching at level of understanding  - Provide teaching via preferred learning methods  Outcome: Progressing     Problem: Nutrition/Hydration-ADULT  Goal: Nutrient/Hydration intake appropriate for improving, restoring or maintaining nutritional needs  Description  Monitor and assess patient's nutrition/hydration status for malnutrition  Collaborate with interdisciplinary team and initiate plan and interventions as ordered  Monitor patient's weight and dietary intake as ordered or per policy  Utilize nutrition screening tool and intervene as necessary  Determine patient's food preferences and provide high-protein, high-caloric foods as appropriate       INTERVENTIONS:  - Monitor oral intake, urinary output, labs, and treatment plans  - Assess nutrition and hydration status and recommend course of action  - Evaluate amount of meals eaten  - Assist patient with eating if necessary   - Allow adequate time for meals  - Recommend/ encourage appropriate diets, oral nutritional supplements, and vitamin/mineral supplements  - Order, calculate, and assess calorie counts as needed  - Recommend, monitor, and adjust tube feedings and TPN/PPN based on assessed needs  - Assess need for intravenous fluids  - Provide specific nutrition/hydration education as appropriate  - Include patient/family/caregiver in decisions related to nutrition  Outcome: Progressing     Problem: SAFETY,RESTRAINT: NV/NON-SELF DESTRUCTIVE BEHAVIOR  Goal: Remains free of harm/injury (restraint for non violent/non self-detsructive behavior)  Description  INTERVENTIONS:  - Instruct patient/family regarding restraint use   - Assess and monitor physiologic and psychological status   - Provide interventions and comfort measures to meet assessed patient needs   - Identify and implement measures to help patient regain control  - Assess readiness for release of restraint   Outcome: Progressing  Goal: Returns to optimal restraint-free functioning  Description  INTERVENTIONS:  - Assess the patient's behavior and symptoms that indicate continued need for restraint  - Identify and implement measures to help patient regain control  - Assess readiness for release of restraint   Outcome: Progressing

## 2020-07-11 LAB
ANION GAP SERPL CALCULATED.3IONS-SCNC: 5 MMOL/L (ref 4–13)
BASOPHILS # BLD AUTO: 0.02 THOUSANDS/ΜL (ref 0–0.1)
BASOPHILS NFR BLD AUTO: 1 % (ref 0–1)
BUN SERPL-MCNC: 13 MG/DL (ref 5–25)
CALCIUM SERPL-MCNC: 8.5 MG/DL (ref 8.3–10.1)
CHLORIDE SERPL-SCNC: 102 MMOL/L (ref 100–108)
CK MB SERPL-MCNC: 41.9 % (ref 0–2.5)
CK MB SERPL-MCNC: 81.7 NG/ML (ref 0–5)
CK SERPL-CCNC: 195 U/L (ref 26–192)
CO2 SERPL-SCNC: 29 MMOL/L (ref 21–32)
CREAT SERPL-MCNC: 1.61 MG/DL (ref 0.6–1.3)
EOSINOPHIL # BLD AUTO: 0.06 THOUSAND/ΜL (ref 0–0.61)
EOSINOPHIL NFR BLD AUTO: 2 % (ref 0–6)
ERYTHROCYTE [DISTWIDTH] IN BLOOD BY AUTOMATED COUNT: 15.8 % (ref 11.6–15.1)
GFR SERPL CREATININE-BSD FRML MDRD: 32 ML/MIN/1.73SQ M
GLUCOSE SERPL-MCNC: 126 MG/DL (ref 65–140)
GLUCOSE SERPL-MCNC: 132 MG/DL (ref 65–140)
GLUCOSE SERPL-MCNC: 162 MG/DL (ref 65–140)
GLUCOSE SERPL-MCNC: 168 MG/DL (ref 65–140)
GLUCOSE SERPL-MCNC: 197 MG/DL (ref 65–140)
HCT VFR BLD AUTO: 38.1 % (ref 34.8–46.1)
HGB BLD-MCNC: 12 G/DL (ref 11.5–15.4)
IMM GRANULOCYTES # BLD AUTO: 0.04 THOUSAND/UL (ref 0–0.2)
IMM GRANULOCYTES NFR BLD AUTO: 1 % (ref 0–2)
LYMPHOCYTES # BLD AUTO: 1.7 THOUSANDS/ΜL (ref 0.6–4.47)
LYMPHOCYTES NFR BLD AUTO: 42 % (ref 14–44)
MCH RBC QN AUTO: 31.8 PG (ref 26.8–34.3)
MCHC RBC AUTO-ENTMCNC: 31.5 G/DL (ref 31.4–37.4)
MCV RBC AUTO: 101 FL (ref 82–98)
MONOCYTES # BLD AUTO: 0.24 THOUSAND/ΜL (ref 0.17–1.22)
MONOCYTES NFR BLD AUTO: 6 % (ref 4–12)
NEUTROPHILS # BLD AUTO: 1.97 THOUSANDS/ΜL (ref 1.85–7.62)
NEUTS SEG NFR BLD AUTO: 48 % (ref 43–75)
NRBC BLD AUTO-RTO: 0 /100 WBCS
PLATELET # BLD AUTO: 77 THOUSANDS/UL (ref 149–390)
PMV BLD AUTO: 10.2 FL (ref 8.9–12.7)
POTASSIUM SERPL-SCNC: 3.2 MMOL/L (ref 3.5–5.3)
PROCALCITONIN SERPL-MCNC: 0.53 NG/ML
RBC # BLD AUTO: 3.77 MILLION/UL (ref 3.81–5.12)
SODIUM SERPL-SCNC: 136 MMOL/L (ref 136–145)
WBC # BLD AUTO: 4.03 THOUSAND/UL (ref 4.31–10.16)

## 2020-07-11 PROCEDURE — 99232 SBSQ HOSP IP/OBS MODERATE 35: CPT | Performed by: INTERNAL MEDICINE

## 2020-07-11 PROCEDURE — 80048 BASIC METABOLIC PNL TOTAL CA: CPT | Performed by: INTERNAL MEDICINE

## 2020-07-11 PROCEDURE — 82948 REAGENT STRIP/BLOOD GLUCOSE: CPT

## 2020-07-11 PROCEDURE — 82553 CREATINE MB FRACTION: CPT | Performed by: INTERNAL MEDICINE

## 2020-07-11 PROCEDURE — 85025 COMPLETE CBC W/AUTO DIFF WBC: CPT | Performed by: INTERNAL MEDICINE

## 2020-07-11 PROCEDURE — 82550 ASSAY OF CK (CPK): CPT | Performed by: INTERNAL MEDICINE

## 2020-07-11 PROCEDURE — C9113 INJ PANTOPRAZOLE SODIUM, VIA: HCPCS | Performed by: INTERNAL MEDICINE

## 2020-07-11 PROCEDURE — 84145 PROCALCITONIN (PCT): CPT | Performed by: INTERNAL MEDICINE

## 2020-07-11 RX ADMIN — METOPROLOL TARTRATE 5 MG: 5 INJECTION INTRAVENOUS at 00:08

## 2020-07-11 RX ADMIN — FOLIC ACID 1 MG: 1 TABLET ORAL at 09:27

## 2020-07-11 RX ADMIN — HEPARIN SODIUM 5000 UNITS: 5000 INJECTION INTRAVENOUS; SUBCUTANEOUS at 05:18

## 2020-07-11 RX ADMIN — CEFEPIME HYDROCHLORIDE 1000 MG: 1 INJECTION, POWDER, FOR SOLUTION INTRAMUSCULAR; INTRAVENOUS at 20:52

## 2020-07-11 RX ADMIN — METRONIDAZOLE 500 MG: 500 INJECTION, SOLUTION INTRAVENOUS at 22:04

## 2020-07-11 RX ADMIN — STANDARDIZED SENNA CONCENTRATE 8.6 MG: 8.6 TABLET ORAL at 09:27

## 2020-07-11 RX ADMIN — METOPROLOL TARTRATE 5 MG: 5 INJECTION INTRAVENOUS at 05:18

## 2020-07-11 RX ADMIN — METRONIDAZOLE 500 MG: 500 INJECTION, SOLUTION INTRAVENOUS at 12:30

## 2020-07-11 RX ADMIN — LIDOCAINE 1 PATCH: 50 PATCH TOPICAL at 09:28

## 2020-07-11 RX ADMIN — HYDROXYCHLOROQUINE SULFATE 200 MG: 200 TABLET, FILM COATED ORAL at 09:27

## 2020-07-11 RX ADMIN — HEPARIN SODIUM 5000 UNITS: 5000 INJECTION INTRAVENOUS; SUBCUTANEOUS at 21:00

## 2020-07-11 RX ADMIN — HEPARIN SODIUM 5000 UNITS: 5000 INJECTION INTRAVENOUS; SUBCUTANEOUS at 13:19

## 2020-07-11 RX ADMIN — TRAZODONE HYDROCHLORIDE 50 MG: 50 TABLET ORAL at 21:00

## 2020-07-11 RX ADMIN — Medication 1 CAPSULE: at 17:32

## 2020-07-11 RX ADMIN — METOPROLOL TARTRATE 5 MG: 5 INJECTION INTRAVENOUS at 17:32

## 2020-07-11 RX ADMIN — METOPROLOL TARTRATE 5 MG: 5 INJECTION INTRAVENOUS at 23:00

## 2020-07-11 RX ADMIN — CHOLESTYRAMINE 4 G: 4 POWDER, FOR SUSPENSION ORAL at 17:32

## 2020-07-11 RX ADMIN — LEVETIRACETAM 500 MG: 100 INJECTION, SOLUTION INTRAVENOUS at 09:28

## 2020-07-11 RX ADMIN — METRONIDAZOLE 500 MG: 500 INJECTION, SOLUTION INTRAVENOUS at 05:14

## 2020-07-11 RX ADMIN — METOPROLOL TARTRATE 5 MG: 5 INJECTION INTRAVENOUS at 12:30

## 2020-07-11 RX ADMIN — PANTOPRAZOLE SODIUM 40 MG: 40 INJECTION, POWDER, FOR SOLUTION INTRAVENOUS at 09:28

## 2020-07-11 RX ADMIN — CHOLESTYRAMINE 4 G: 4 POWDER, FOR SUSPENSION ORAL at 09:28

## 2020-07-11 NOTE — PROGRESS NOTES
[facility] - Progress Note  Patient: Nba Adame 70 y o  female MRN: [id number]  [facility] Encounter: [id number]  Primary Care Provider: No primary care provider on file    Date Of Visit: 07/11/20        Assessment & Plan:    * Status post fall  Assessment & Plan  - unwitnessed fall out of bed at skilled facility  - CT of head and cervical spine unremarkable for hemorrhaging, fractures, or dislocations  - PT/OT as tolerated -> resides in a lock-down dementia unit however    Aspiration pneumonia   Assessment & Plan  - of the right mid/lower lobes in the setting of progressive dementia and associated dysphagia - confirmed on CXR imaging  - appreciate speech/swallow input -> upgrade to pureed diet with thin liquids currently  - continue IV Cefepime/Flagyl  - COVID-19 testing on 7/5 negative  - remaining afebrile - procalcitonin mildly improved to 0 53 from previous - blood cultures from 7/5 are negative - repeat cultures drawn on 7/8 remain preliminarily negative  - long-term prognosis currently poor -> palliative care input appreciated    Dementia  Assessment & Plan  - resides at a locked dementia unit @ [facility]  - c/w Trazadone QHS  - delirium precautions  - appreciate geriatrics input  - appreciate palliative care input regarding goals of care - family requesting continued antibiotic treatment for pneumonia and hemodialysis for the time being pending further clinical decline - remains a DNR/DNI    Acute rhabdomyolysis  Assessment & Plan  - secondary to fall  - CPK trend:  583 -> 526 -> 230 -> 195 currently  - now off IV fluids    ESRD (end stage renal disease)   Assessment & Plan  - continue routine hemodialysis per nephrology  - on Nephrocaps    Severe protein-calorie malnutrition  Assessment & Plan  - BMI of 15 97 in the setting of chronic illness coupled with decreased appetite/oral intake with evidence of muscle wasting/atrophy on exam  - on Nepro nutritional supplementation    Pancytopenia   Assessment & Plan  - monitor CBC and transfuse as necessary    Essential hypertension  Assessment & Plan  - resume PO Norvasc/Lopressor once consistently tolerating oral diet - c/w IV Lopressor Q6h for now  - PRN IV Hydralazine additionally on board for BP spikes    Seizures  Assessment & Plan  - continue Keppra  - seizure precautions    Rheumatoid arthritis  Assessment & Plan  - continue Plaquenil    Elevated troponin  Assessment & Plan  - likely a non-MI troponin elevation in the setting of fall, coupled with accelerated blood pressure on admission and mild rhabdomyolysis, w/ history of ESRD  - troponin peak of 0 08  - echocardiogram revealed normal EF with no evidence of LV regional wall motion abnormalities      DVT Prophylaxis:  Heparin SC       Patient Centered Rounds:  I have performed bedside rounds and discussed plan of care with nursing today  Discussions with Specialists or Other Care Team Provider:  see above assessments if applicable    Education and Discussions with Family / Patient:  Significant other present was bedside today    Time Spent for Care:  32 minutes  More than 50% of total time spent on counseling and coordination of care as described above  Current Length of Stay: 6 day(s)    Current Patient Status: Inpatient   Certification Statement:  Patient will continue to require additional hospital stay due to assessments as noted above  Code Status: Level 3 - DNAR and DNI        Subjective:     Seen/examined earlier in the day  No new complaints at this time  She remains with a relatively pleasant demeanor  Generalized weakness/fatigue persists  Objective:     Vitals:   Temp (24hrs), Av 1 °F (36 2 °C), Min:96 2 °F (35 7 °C), Max:97 9 °F (36 6 °C)    Temp:  [96 2 °F (35 7 °C)-97 9 °F (36 6 °C)] 97 9 °F (36 6 °C)  HR:  [62-92] 65  Resp:  [14-18] 14  BP: (131-184)/(67-92) 142/67  SpO2:  [96 %-100 %] 97 %  Body mass index is 16 31 kg/m²  Input and Output Summary (last 24 hours): Intake/Output Summary (Last 24 hours) at 7/11/2020 1701  Last data filed at 7/11/2020 0940  Gross per 24 hour   Intake 240 ml   Output    Net 240 ml       Physical Exam:     GENERAL:  Frail/cachectic  HEAD:  Normocephalic - atraumatic - temporal wasting present  EYES: PERRL - EOMI   MOUTH:  Mucosa moist  NECK:  Supple - full range of motion - clavicular wasting noted  CARDIAC:  Regular rate/rhythm - S1/S2 positive  PULMONARY:  Diminished breath sounds - nonlabored respirations  ABDOMEN:  Soft - nontender/nondistended - active bowel sounds  MUSCULOSKELETAL:  Motor strength/range of motion remains deconditioned  NEUROLOGIC:  Baseline demented  SKIN:  Chronic wrinkles/blemishes         Additional Data:     Labs & Recent Cultures:    Results from last 7 days   Lab Units 07/11/20  0446   WBC Thousand/uL 4 03*   HEMOGLOBIN g/dL 12 0   HEMATOCRIT % 38 1   PLATELETS Thousands/uL 77*   NEUTROS PCT % 48   LYMPHS PCT % 42   MONOS PCT % 6   EOS PCT % 2     Results from last 7 days   Lab Units 07/11/20  0446  07/05/20  0911   POTASSIUM mmol/L 3 2*   < > 4 2   CHLORIDE mmol/L 102   < > 102   CO2 mmol/L 29   < > 29   BUN mg/dL 13   < > 37*   CREATININE mg/dL 1 61*   < > 2 59*   CALCIUM mg/dL 8 5   < > 9 5   ALK PHOS U/L  --   --  129*   ALT U/L  --   --  54   AST U/L  --   --  70*    < > = values in this interval not displayed           Results from last 7 days   Lab Units 07/11/20  1146 07/11/20  0552 07/11/20  0001 07/10/20  1806 07/10/20  0728 07/10/20  0027 07/09/20  1757 07/09/20  1137 07/09/20  0602 07/09/20  0125 07/09/20  0002 07/08/20  1756   POC GLUCOSE mg/dl 197* 132 162* 116 74 93 85 83 77 74 66 88         Results from last 7 days   Lab Units 07/11/20  0446 07/10/20  0653 07/09/20  0652 07/08/20  1112 07/07/20  0447  07/05/20  1109   LACTIC ACID mmol/L  --   --   --   --   --   --  1 5   PROCALCITONIN ng/ml 0 53* 0 50* 0 71* 0 78* 0 41*   < >  --     < > = values in this interval not displayed  Results from last 7 days   Lab Units 07/08/20  1113 07/05/20  1940 07/05/20  1111 07/05/20  1109   BLOOD CULTURE  No Growth at 48 hrs  No Growth at 48 hrs  --  No Growth After 5 Days  No Growth After 5 Days  C DIFF TOXIN B   --  Negative  --   --          Last 24 Hours Medication List:     Current Facility-Administered Medications:  acetaminophen 975 mg Oral Q6H PRN Kristina Kim PA-C    b complex-vitamin C-folic acid 1 capsule Oral Daily With Earline CodeRYAN    calcium carbonate 1,000 mg Oral Daily PRN Isaiah Zaman PA-C    cefepime 1,000 mg Intravenous Q24H Cresencio Martínez MD Last Rate: 1,000 mg (07/10/20 2100)   cholestyramine sugar free 4 g Oral BID Deng Zaman PA-C    folic acid 1 mg Oral Daily Deng Zaman PA-C    heparin (porcine) 5,000 Units Subcutaneous Q8H [address] Deng Zaman PA-C    hydrALAZINE 5 mg Intravenous Q6H PRN Cresencio Martínez MD    hydroxychloroquine 200 mg Oral Daily With Breakfast Deng Zaman PA-C    levETIRAcetam 500 mg Intravenous Once per day on Sun Tue Thu Sat Isaiah Zaman PA-C Last Rate: Stopped (07/11/20 1646)   levETIRAcetam 750 mg Intravenous Once per day on Mon Wed Fri Katie Horan MD Last Rate: Stopped (07/10/20 1801)   lidocaine 1 patch Topical Daily Merrell Kayser, DO    metoprolol 5 mg Intravenous Q6H Deng Zaman PA-C    metroNIDAZOLE 500 mg Intravenous Q8H Cresencio Martínez MD Last Rate: Stopped (07/11/20 1646)   ondansetron 4 mg Intravenous Q6H PRN Deng Zaman PA-C    pantoprazole 40 mg Intravenous Q24H [address] Cresencio Martínez MD    senna 1 tablet Oral Daily Deng Zaman PA-C    traZODone 50 mg Oral HS Deng Zaman PA-C                   ** Please Note: This note is constructed using a voice recognition dictation system  An occasional wrong word/phrase or sound-a-like substitution may have been picked up by dictation device due to the inherent limitations of voice recognition software    Read the chart carefully and recognize, using reasonable context, where substitutions may have occurred  **

## 2020-07-11 NOTE — ASSESSMENT & PLAN NOTE
- resides at a locked dementia unit @ [facility]  - c/w Trazadone QHS  - delirium precautions  - appreciate geriatrics input  - appreciate palliative care input regarding goals of care - family requesting continued antibiotic treatment for pneumonia and hemodialysis for the time being pending further clinical decline - remains a DNR/DNI

## 2020-07-11 NOTE — PROGRESS NOTES
NEPHROLOGY PROGRESS NOTE   Jazzmine Service 70 y o  female MRN: [id number]  Unit/Bed#: [facility] 913-01 Encounter: [id number]      ASSESSMENT & PLAN    1  End Stage Kidney Disease   o Receiving dialysis Monday Wednesday Friday  o Access:  Has tunneled dialysis catheter placed  o Plan:  Presents with a fall from a nursing facility  2  Electrolytes:  o Potassium does remain low  3  Acid/Base:  o Bicarb stable no anion gap  4  BP/HR:  o Blood pressure is currently acceptable  5  Volume Status  o Currently volume depleted and cachectic is tolerating some p o  Intake  6  Anemia of Chronic Kidney Disease  o Hemoglobin stable  7  MBD  o Secondary Hyperparathyroidism of Renal Origin monitor parameters as an outpatient  o Hyperphosphatemia will check a phosphorus  8  Clinical Course/Risk Reduction/Health Maintenance  o Dementia  o Resides at [facility] in [facility]  o Palliative care and Geriatrics follow-up  o Intravenous fluids as needed        SUBJECTIVE:    Patient was seen today  No acute events    OBJECTIVE:  Current Weight: Weight - Scale: 43 1 kg (95 lb 0 3 oz)  Vitals:    07/11/20 1215   BP: 135/92   Pulse: 63   Resp: 16   Temp: (!) 97 °F (36 1 °C)   SpO2: 98%       Intake/Output Summary (Last 24 hours) at 7/11/2020 1418  Last data filed at 7/11/2020 0940  Gross per 24 hour   Intake 360 ml   Output 230 ml   Net 130 ml     Weight (last 2 days)     Date/Time   Weight    07/11/20 0552   43 1 (95 02)    07/10/20 0600   41 1 (90 7)    07/09/20 0600   39 1 (86 2)              General:  Frail and cachectic  Eyes: conjunctivae pink, anicteric sclerae  ENT: lips and mucous membranes moist  Neck: supple, no JVD  Chest: clear breath sounds bilateral, no crackles, ronchus or wheezings  CVS: distinct S1 & S2, normal rate, regular rhythm  Abdomen: soft, non-tender, non-distended, normoactive bowel sounds  Extremities: no edema of both legs  Skin: no rash  Neuro:  Resting comfortably difficult to arouse but did tolerate p o   Intake earlier today      Medications:    Current Facility-Administered Medications:     acetaminophen (TYLENOL) tablet 975 mg, 975 mg, Oral, Q6H PRN, RYAN Jean Baptiste complex-vitamin C-folic acid (NEPHROCAPS) capsule 1 capsule, 1 capsule, Oral, Daily With Dinner, Tyler Zaman PA-C, 1 capsule at 07/09/20 1811    calcium carbonate (TUMS) chewable tablet 1,000 mg, 1,000 mg, Oral, Daily PRN, [facility] L Deleon, PA-C    cefepime (MAXIPIME) 1,000 mg in dextrose 5 % 50 mL IVPB, 1,000 mg, Intravenous, Q24H, Brenna Pike MD, Last Rate: 100 mL/hr at 07/10/20 2100, 1,000 mg at 07/10/20 2100    cholestyramine sugar free (QUESTRAN LIGHT) packet 4 g, 4 g, Oral, BID, Deng Zaman PA-C, 4 g at 04/61/62 9390    folic acid (FOLVITE) tablet 1 mg, 1 mg, Oral, Daily, Deng Zaman PA-C, 1 mg at 07/11/20 2875    heparin (porcine) subcutaneous injection 5,000 Units, 5,000 Units, Subcutaneous, Q8H [facility], 5,000 Units at 07/11/20 1319 **AND** [CANCELED] Platelet count, , , Once, Deng Zaman PA-C    hydrALAZINE (APRESOLINE) injection 5 mg, 5 mg, Intravenous, Q6H PRN, Brenna Pike MD    hydroxychloroquine (PLAQUENIL) tablet 200 mg, 200 mg, Oral, Daily With Breakfast, Deng Zaman PA-C, 200 mg at 07/11/20 0927    levETIRAcetam (KEPPRA) 500 mg in sodium chloride 0 9 % 100 mL IVPB, 500 mg, Intravenous, Once per day on Sun Tue Thu Sat, Deng Zaman PA-C, Last Rate: 400 mL/hr at 07/11/20 0928, 500 mg at 07/11/20 0928    levETIRAcetam (KEPPRA) 750 mg in sodium chloride 0 9 % 100 mL IVPB, 750 mg, Intravenous, Once per day on Mon Wed Fri, Katherine Hendricks MD, Stopped at 07/10/20 1801    lidocaine (LIDODERM) 5 % patch 1 patch, 1 patch, Topical, Daily, Lindsey Flanagan DO, 1 patch at 07/11/20 0928    metoprolol (LOPRESSOR) injection 5 mg, 5 mg, Intravenous, Q6H, Deng Zaman PA-C, 5 mg at 07/11/20 1230    metroNIDAZOLE (FLAGYL) IVPB (premix) 500 mg 100 mL, 500 mg, Intravenous, Q8H, Brenna Pike MD, Last Rate: 200 mL/hr at 07/11/20 1230, 500 mg at 07/11/20 1230    ondansetron (ZOFRAN) injection 4 mg, 4 mg, Intravenous, Q6H PRN, Deng Zaman PA-C    pantoprazole (PROTONIX) injection 40 mg, 40 mg, Intravenous, Q24H ELIDA, Debbie Ventura MD, 40 mg at 07/11/20 0928    senna (SENOKOT) tablet 8 6 mg, 1 tablet, Oral, Daily, Deng Zaman PA-C, 8 6 mg at 07/11/20 0436    traZODone (DESYREL) tablet 50 mg, 50 mg, Oral, HS, Deng Zaman PA-C, 50 mg at 07/10/20 2108    Invasive Devices:      Lab Results:   Results from last 7 days   Lab Units 07/11/20  0446 07/10/20  0653 07/09/20  0606 07/08/20  1113 07/08/20  0453 07/07/20  0446 07/06/20  0603 07/05/20  1319 07/05/20  1111 07/05/20  1109 07/05/20  0911   WBC Thousand/uL 4 03* 3 05* 3 19*  --  6 01  --  3 20*  --   --   --  2 79*   HEMOGLOBIN g/dL 12 0 10 8* 10 1*  --  10 3*  --  12 3  --   --   --  10 9*   HEMATOCRIT % 38 1 36 3 32 9*  --  32 8*  --  39 2  --   --   --  34 4*   PLATELETS Thousands/uL 77* 62* 62*  --  78*  --  88*  --   --   --  77*   POTASSIUM mmol/L 3 2* 4 9 3 5  --  3 8 4 2 4 2  --   --   --  4 2   CHLORIDE mmol/L 102 111* 108  --  104 103 102  --   --   --  102   CO2 mmol/L 29 23 25  --  26 25 24  --   --   --  29   BUN mg/dL 13 34* 32*  --  26* 17 40*  --   --   --  37*   CREATININE mg/dL 1 61* 2 95* 2 85*  --  2 42* 1 41* 2 55*  --   --   --  2 59*   CALCIUM mg/dL 8 5 9 0 8 4  --  8 6 9 1 9 4  --   --   --  9 5   MAGNESIUM mg/dL  --   --   --   --   --   --  2 2  --   --   --   --    ALK PHOS U/L  --   --   --   --   --   --   --   --   --   --  129*   ALT U/L  --   --   --   --   --   --   --   --   --   --  54   AST U/L  --   --   --   --   --   --   --   --   --   --  70*   BLOOD CULTURE   --   --   --  No Growth at 48 hrs  No Growth at 48 hrs   --   --   --   --  No Growth After 5 Days  No Growth After 5 Days    --    LEUKOCYTES UA   --   --   --   --   --   --   --  Trace*  --   --   --    BLOOD UA   --   --   --   --   --   --   --  Small*  --   --   -- Previous work up:  Please see previous notes

## 2020-07-11 NOTE — ASSESSMENT & PLAN NOTE
- of the right mid/lower lobes in the setting of progressive dementia and associated dysphagia - confirmed on CXR imaging  - appreciate speech/swallow input -> upgrade to pureed diet with thin liquids currently  - continue IV Cefepime/Flagyl  - COVID-19 testing on 7/5 negative  - remaining afebrile - procalcitonin mildly improved to 0 53 from previous - blood cultures from 7/5 are negative - repeat cultures drawn on 7/8 remain preliminarily negative  - long-term prognosis currently poor -> palliative care input appreciated

## 2020-07-11 NOTE — PLAN OF CARE
Problem: Prexisting or High Potential for Compromised Skin Integrity  Goal: Skin integrity is maintained or improved  Description  INTERVENTIONS:  - Identify patients at risk for skin breakdown  - Assess and monitor skin integrity  - Assess and monitor nutrition and hydration status  - Monitor labs   - Assess for incontinence   - Turn and reposition patient  - Assist with mobility/ambulation  - Relieve pressure over bony prominences  - Avoid friction and shearing  - Provide appropriate hygiene as needed including keeping skin clean and dry  - Evaluate need for skin moisturizer/barrier cream  - Collaborate with interdisciplinary team   - Patient/family teaching  - Consider wound care consult   Outcome: Progressing     Problem: Potential for Falls  Goal: Patient will remain free of falls  Description  INTERVENTIONS:  - Assess patient frequently for physical needs  -  Identify cognitive and physical deficits and behaviors that affect risk of falls    -  [place] fall precautions as indicated by assessment   - Educate patient/family on patient safety including physical limitations  - Instruct patient to call for assistance with activity based on assessment  - Modify environment to reduce risk of injury  - Consider OT/PT consult to assist with strengthening/mobility  Outcome: Progressing     Problem: PAIN - ADULT  Goal: Verbalizes/displays adequate comfort level or baseline comfort level  Description  Interventions:  - Encourage patient to monitor pain and request assistance  - Assess pain using appropriate pain scale  - Administer analgesics based on type and severity of pain and evaluate response  - Implement non-pharmacological measures as appropriate and evaluate response  - Consider cultural and social influences on pain and pain management  - Notify physician/advanced practitioner if interventions unsuccessful or patient reports new pain  Outcome: Progressing     Problem: SAFETY ADULT  Goal: Maintain or return to baseline ADL function  Description  INTERVENTIONS:  -  Assess patient's ability to carry out ADLs; assess patient's baseline for ADL function and identify physical deficits which impact ability to perform ADLs (bathing, care of mouth/teeth, toileting, grooming, dressing, etc )  - Assess/evaluate cause of self-care deficits   - Assess range of motion  - Assess patient's mobility; develop plan if impaired  - Assess patient's need for assistive devices and provide as appropriate  - Encourage maximum independence but intervene and supervise when necessary  - Involve family in performance of ADLs  - Assess for home care needs following discharge   - Consider OT consult to assist with ADL evaluation and planning for discharge  - Provide patient education as appropriate  Outcome: Progressing  Goal: Maintain or return mobility status to optimal level  Description  INTERVENTIONS:  - Assess patient's baseline mobility status (ambulation, transfers, stairs, etc )    - Identify cognitive and physical deficits and behaviors that affect mobility  - Identify mobility aids required to assist with transfers and/or ambulation (gait belt, sit-to-stand, lift, walker, cane, etc )  - [place] fall precautions as indicated by assessment  - Record patient progress and toleration of activity level on Mobility SBAR; progress patient to next Phase/Stage  - Instruct patient to call for assistance with activity based on assessment  - Consider rehabilitation consult to assist with strengthening/weightbearing, etc   Outcome: Progressing     Problem: DISCHARGE PLANNING  Goal: Discharge to home or other facility with appropriate resources  Description  INTERVENTIONS:  - Identify barriers to discharge w/patient and caregiver  - Arrange for needed discharge resources and transportation as appropriate  - Identify discharge learning needs (meds, wound care, etc )  - Arrange for interpretive services to assist at discharge as needed  - Refer to Case Management Department for coordinating discharge planning if the patient needs post-hospital services based on physician/advanced practitioner order or complex needs related to functional status, cognitive ability, or social support system  Outcome: Progressing     Problem: Knowledge Deficit  Goal: Patient/family/caregiver demonstrates understanding of disease process, treatment plan, medications, and discharge instructions  Description  Complete learning assessment and assess knowledge base  Interventions:  - Provide teaching at level of understanding  - Provide teaching via preferred learning methods  Outcome: Progressing     Problem: Nutrition/Hydration-ADULT  Goal: Nutrient/Hydration intake appropriate for improving, restoring or maintaining nutritional needs  Description  Monitor and assess patient's nutrition/hydration status for malnutrition  Collaborate with interdisciplinary team and initiate plan and interventions as ordered  Monitor patient's weight and dietary intake as ordered or per policy  Utilize nutrition screening tool and intervene as necessary  Determine patient's food preferences and provide high-protein, high-caloric foods as appropriate       INTERVENTIONS:  - Monitor oral intake, urinary output, labs, and treatment plans  - Assess nutrition and hydration status and recommend course of action  - Evaluate amount of meals eaten  - Assist patient with eating if necessary   - Allow adequate time for meals  - Recommend/ encourage appropriate diets, oral nutritional supplements, and vitamin/mineral supplements  - Order, calculate, and assess calorie counts as needed  - Recommend, monitor, and adjust tube feedings and TPN/PPN based on assessed needs  - Assess need for intravenous fluids  - Provide specific nutrition/hydration education as appropriate  - Include patient/family/caregiver in decisions related to nutrition  Outcome: Progressing     Problem: SAFETY,RESTRAINT: NV/NON-SELF DESTRUCTIVE BEHAVIOR  Goal: Remains free of harm/injury (restraint for non violent/non self-detsructive behavior)  Description  INTERVENTIONS:  - Instruct patient/family regarding restraint use   - Assess and monitor physiologic and psychological status   - Provide interventions and comfort measures to meet assessed patient needs   - Identify and implement measures to help patient regain control  - Assess readiness for release of restraint   Outcome: Progressing  Goal: Returns to optimal restraint-free functioning  Description  INTERVENTIONS:  - Assess the patient's behavior and symptoms that indicate continued need for restraint  - Identify and implement measures to help patient regain control  - Assess readiness for release of restraint   Outcome: Progressing     Problem: METABOLIC, FLUID AND ELECTROLYTES - ADULT  Goal: Electrolytes maintained within normal limits  Description  INTERVENTIONS:  - Monitor labs and assess patient for signs and symptoms of electrolyte imbalances  - Administer electrolyte replacement as ordered  - Monitor response to electrolyte replacements, including repeat lab results as appropriate  - Instruct patient on fluid and nutrition as appropriate  Outcome: Progressing  Goal: Fluid balance maintained  Description  INTERVENTIONS:  - Monitor labs   - Monitor I/O and WT  - Instruct patient on fluid and nutrition as appropriate  - Assess for signs & symptoms of volume excess or deficit  Outcome: Progressing

## 2020-07-12 LAB
ANION GAP SERPL CALCULATED.3IONS-SCNC: 6 MMOL/L (ref 4–13)
BASOPHILS # BLD AUTO: 0.01 THOUSANDS/ΜL (ref 0–0.1)
BASOPHILS NFR BLD AUTO: 0 % (ref 0–1)
BUN SERPL-MCNC: 20 MG/DL (ref 5–25)
CALCIUM SERPL-MCNC: 8.6 MG/DL (ref 8.3–10.1)
CHLORIDE SERPL-SCNC: 105 MMOL/L (ref 100–108)
CO2 SERPL-SCNC: 27 MMOL/L (ref 21–32)
CREAT SERPL-MCNC: 2.09 MG/DL (ref 0.6–1.3)
EOSINOPHIL # BLD AUTO: 0.07 THOUSAND/ΜL (ref 0–0.61)
EOSINOPHIL NFR BLD AUTO: 3 % (ref 0–6)
ERYTHROCYTE [DISTWIDTH] IN BLOOD BY AUTOMATED COUNT: 15.8 % (ref 11.6–15.1)
GFR SERPL CREATININE-BSD FRML MDRD: 23 ML/MIN/1.73SQ M
GLUCOSE SERPL-MCNC: 101 MG/DL (ref 65–140)
GLUCOSE SERPL-MCNC: 102 MG/DL (ref 65–140)
GLUCOSE SERPL-MCNC: 79 MG/DL (ref 65–140)
GLUCOSE SERPL-MCNC: 93 MG/DL (ref 65–140)
GLUCOSE SERPL-MCNC: 98 MG/DL (ref 65–140)
HCT VFR BLD AUTO: 32.9 % (ref 34.8–46.1)
HGB BLD-MCNC: 10.4 G/DL (ref 11.5–15.4)
IMM GRANULOCYTES # BLD AUTO: 0 THOUSAND/UL (ref 0–0.2)
IMM GRANULOCYTES NFR BLD AUTO: 0 % (ref 0–2)
LYMPHOCYTES # BLD AUTO: 0.94 THOUSANDS/ΜL (ref 0.6–4.47)
LYMPHOCYTES NFR BLD AUTO: 38 % (ref 14–44)
MCH RBC QN AUTO: 31.5 PG (ref 26.8–34.3)
MCHC RBC AUTO-ENTMCNC: 31.6 G/DL (ref 31.4–37.4)
MCV RBC AUTO: 100 FL (ref 82–98)
MONOCYTES # BLD AUTO: 0.16 THOUSAND/ΜL (ref 0.17–1.22)
MONOCYTES NFR BLD AUTO: 6 % (ref 4–12)
NEUTROPHILS # BLD AUTO: 1.32 THOUSANDS/ΜL (ref 1.85–7.62)
NEUTS SEG NFR BLD AUTO: 53 % (ref 43–75)
NRBC BLD AUTO-RTO: 0 /100 WBCS
PLATELET # BLD AUTO: 62 THOUSANDS/UL (ref 149–390)
PMV BLD AUTO: 9.2 FL (ref 8.9–12.7)
POTASSIUM SERPL-SCNC: 3 MMOL/L (ref 3.5–5.3)
RBC # BLD AUTO: 3.3 MILLION/UL (ref 3.81–5.12)
SODIUM SERPL-SCNC: 138 MMOL/L (ref 136–145)
WBC # BLD AUTO: 2.5 THOUSAND/UL (ref 4.31–10.16)

## 2020-07-12 PROCEDURE — 80048 BASIC METABOLIC PNL TOTAL CA: CPT | Performed by: INTERNAL MEDICINE

## 2020-07-12 PROCEDURE — C9113 INJ PANTOPRAZOLE SODIUM, VIA: HCPCS | Performed by: INTERNAL MEDICINE

## 2020-07-12 PROCEDURE — 99232 SBSQ HOSP IP/OBS MODERATE 35: CPT | Performed by: INTERNAL MEDICINE

## 2020-07-12 PROCEDURE — 85025 COMPLETE CBC W/AUTO DIFF WBC: CPT | Performed by: INTERNAL MEDICINE

## 2020-07-12 PROCEDURE — 82948 REAGENT STRIP/BLOOD GLUCOSE: CPT

## 2020-07-12 RX ORDER — POTASSIUM CHLORIDE 14.9 MG/ML
20 INJECTION INTRAVENOUS
Status: COMPLETED | OUTPATIENT
Start: 2020-07-12 | End: 2020-07-12

## 2020-07-12 RX ADMIN — POTASSIUM CHLORIDE 20 MEQ: 14.9 INJECTION, SOLUTION INTRAVENOUS at 11:43

## 2020-07-12 RX ADMIN — METOPROLOL TARTRATE 5 MG: 5 INJECTION INTRAVENOUS at 17:21

## 2020-07-12 RX ADMIN — HYDROXYCHLOROQUINE SULFATE 200 MG: 200 TABLET, FILM COATED ORAL at 08:31

## 2020-07-12 RX ADMIN — CEFEPIME HYDROCHLORIDE 1000 MG: 1 INJECTION, POWDER, FOR SOLUTION INTRAMUSCULAR; INTRAVENOUS at 20:38

## 2020-07-12 RX ADMIN — METOPROLOL TARTRATE 5 MG: 5 INJECTION INTRAVENOUS at 05:52

## 2020-07-12 RX ADMIN — CHOLESTYRAMINE 4 G: 4 POWDER, FOR SUSPENSION ORAL at 08:34

## 2020-07-12 RX ADMIN — METOPROLOL TARTRATE 5 MG: 5 INJECTION INTRAVENOUS at 11:30

## 2020-07-12 RX ADMIN — METRONIDAZOLE 500 MG: 500 INJECTION, SOLUTION INTRAVENOUS at 05:51

## 2020-07-12 RX ADMIN — CHOLESTYRAMINE 4 G: 4 POWDER, FOR SUSPENSION ORAL at 17:26

## 2020-07-12 RX ADMIN — HEPARIN SODIUM 5000 UNITS: 5000 INJECTION INTRAVENOUS; SUBCUTANEOUS at 13:54

## 2020-07-12 RX ADMIN — HEPARIN SODIUM 5000 UNITS: 5000 INJECTION INTRAVENOUS; SUBCUTANEOUS at 21:49

## 2020-07-12 RX ADMIN — HEPARIN SODIUM 5000 UNITS: 5000 INJECTION INTRAVENOUS; SUBCUTANEOUS at 05:52

## 2020-07-12 RX ADMIN — LEVETIRACETAM 500 MG: 100 INJECTION, SOLUTION INTRAVENOUS at 08:50

## 2020-07-12 RX ADMIN — STANDARDIZED SENNA CONCENTRATE 8.6 MG: 8.6 TABLET ORAL at 08:32

## 2020-07-12 RX ADMIN — POTASSIUM CHLORIDE 20 MEQ: 14.9 INJECTION, SOLUTION INTRAVENOUS at 13:53

## 2020-07-12 RX ADMIN — METRONIDAZOLE 500 MG: 500 INJECTION, SOLUTION INTRAVENOUS at 21:47

## 2020-07-12 RX ADMIN — METRONIDAZOLE 500 MG: 500 INJECTION, SOLUTION INTRAVENOUS at 13:54

## 2020-07-12 RX ADMIN — METOPROLOL TARTRATE 5 MG: 5 INJECTION INTRAVENOUS at 23:11

## 2020-07-12 RX ADMIN — PANTOPRAZOLE SODIUM 40 MG: 40 INJECTION, POWDER, FOR SOLUTION INTRAVENOUS at 08:32

## 2020-07-12 RX ADMIN — FOLIC ACID 1 MG: 1 TABLET ORAL at 08:32

## 2020-07-12 NOTE — PROGRESS NOTES
NEPHROLOGY PROGRESS NOTE   Gonzalo Rosenberg 70 y o  female MRN: [id number]  Unit/Bed#: [facility] 913-01 Encounter: [id number]      ASSESSMENT & PLAN    1  End Stage Kidney Disease   ? Receiving dialysis Monday Wednesday Friday  ? Access:  Has tunneled dialysis catheter placed  ? Plan:  Presents with a fall from a nursing facility  2  Electrolytes:  ? Potassium does remain low is tolerating more p o  Intake, 4 potassium bath on dialysis and liberalize potassium in diet  3  Acid/Base:  ? Bicarb stable no anion gap  4  BP/HR:  ? Blood pressure is currently acceptable  5  Volume Status  ? Comfortable, euvolemic to hypovolemic is tolerating some p o  Intake so will hold off on intravenous fluid  6  Anemia of Chronic Kidney Disease  ? Hemoglobin stable  7  MBD  ? Secondary Hyperparathyroidism of Renal Origin monitor parameters as an outpatient  ? Hyperphosphatemia will check a phosphorus  8  Clinical Course/Risk Reduction/Health Maintenance  ? Dementia  ? Resides at [facility]  ? Palliative care and Geriatrics follow-up  ?  Intravenous fluids as needed       SUBJECTIVE:    Pleasant  Is eating some of her meals  Resting comfortably  No acute events overnight    OBJECTIVE:  Current Weight: Weight - Scale: 43 4 kg (95 lb 10 9 oz)  Vitals:    07/12/20 1121   BP: 148/75   Pulse: 74   Resp:    Temp:    SpO2: 97%       Intake/Output Summary (Last 24 hours) at 7/12/2020 1256  Last data filed at 7/12/2020 0900  Gross per 24 hour   Intake 540 ml   Output 122 ml   Net 418 ml     Weight (last 2 days)     Date/Time   Weight    07/12/20 0600   43 4 (95 68)    07/11/20 0552   43 1 (95 02)    07/10/20 0600   41 1 (90 7)              General:  Frail elderly demented but in no acute distress  Eyes: conjunctivae pink, anicteric sclerae  ENT: lips and mucous membranes moist  Neck: supple, no JVD  Chest: clear breath sounds bilateral, no crackles, ronchus or wheezings  CVS: distinct S1 & S2, normal rate, regular rhythm  Abdomen: soft, non-tender, non-distended, normoactive bowel sounds  Extremities: no edema of both legs  Skin: no rash  Neuro:  Dementia awake      Medications:    Current Facility-Administered Medications:     acetaminophen (TYLENOL) tablet 975 mg, 975 mg, Oral, Q6H PRN, RYAN Jean Baptiste complex-vitamin C-folic acid (NEPHROCAPS) capsule 1 capsule, 1 capsule, Oral, Daily With Dinner, Oumar Zaman PA-C, 1 capsule at 07/11/20 1732    calcium carbonate (TUMS) chewable tablet 1,000 mg, 1,000 mg, Oral, Daily PRN, Deng Zaman PA-C    cefepime (MAXIPIME) 1,000 mg in dextrose 5 % 50 mL IVPB, 1,000 mg, Intravenous, Q24H, Bridgett Posadas MD, Stopped at 07/11/20 2203    cholestyramine sugar free (QUESTRAN LIGHT) packet 4 g, 4 g, Oral, BID, Deng Zaman PA-C, 4 g at 71/21/02 1826    folic acid (FOLVITE) tablet 1 mg, 1 mg, Oral, Daily, Deng Zaman PA-C, 1 mg at 07/12/20 1319    heparin (porcine) subcutaneous injection 5,000 Units, 5,000 Units, Subcutaneous, Q8H [address], 5,000 Units at 07/12/20 0552 **AND** [CANCELED] Platelet count, , , Once, Deng Zaman PA-C    hydrALAZINE (APRESOLINE) injection 5 mg, 5 mg, Intravenous, Q6H PRN, Bridgett Posadas MD    hydroxychloroquine (PLAQUENIL) tablet 200 mg, 200 mg, Oral, Daily With Breakfast, Deng Zaman PA-C, 200 mg at 07/12/20 0831    levETIRAcetam (KEPPRA) 500 mg in sodium chloride 0 9 % 100 mL IVPB, 500 mg, Intravenous, Once per day on Sun Tue Thu Sat, Deng Zaman PA-C, Last Rate: 400 mL/hr at 07/12/20 0850, 500 mg at 07/12/20 0850    levETIRAcetam (KEPPRA) 750 mg in sodium chloride 0 9 % 100 mL IVPB, 750 mg, Intravenous, Once per day on Mon Wed Fri, Yohana Pierce MD, Stopped at 07/10/20 1801    lidocaine (LIDODERM) 5 % patch 1 patch, 1 patch, Topical, Daily, Lindsey Flanagan DO, 1 patch at 07/11/20 0928    metoprolol (LOPRESSOR) injection 5 mg, 5 mg, Intravenous, Q6H, Deng Zaman PA-C, 5 mg at 07/12/20 1130    metroNIDAZOLE (FLAGYL) IVPB (premix) 500 mg 100 mL, 500 mg, Intravenous, Q8H, Bimal Miles MD, Stopped at 07/12/20 0705    ondansetron (ZOFRAN) injection 4 mg, 4 mg, Intravenous, Q6H PRN, Deng Zaman PA-C    pantoprazole (PROTONIX) injection 40 mg, 40 mg, Intravenous, Q24H [facility], Bimal Miles MD, 40 mg at 07/12/20 5673    potassium chloride 20 mEq IVPB (premix), 20 mEq, Intravenous, Q2H, Bimal Miles MD, Last Rate: 50 mL/hr at 07/12/20 1143, 20 mEq at 07/12/20 1143    senna (SENOKOT) tablet 8 6 mg, 1 tablet, Oral, Daily, Deng Zaman PA-C, 8 6 mg at 07/12/20 8219    traZODone (DESYREL) tablet 50 mg, 50 mg, Oral, HS, Deng Zaman PA-C, 50 mg at 07/11/20 2100    Invasive Devices:      Lab Results:   Results from last 7 days   Lab Units 07/12/20  0455 07/11/20  0446 07/10/20  0653 07/09/20  0606 07/08/20  1113 07/08/20  0453  07/06/20  0603  07/05/20  1319   WBC Thousand/uL 2 50* 4 03* 3 05* 3 19*  --  6 01  --  3 20*   < >  --    HEMOGLOBIN g/dL 10 4* 12 0 10 8* 10 1*  --  10 3*  --  12 3   < >  --    HEMATOCRIT % 32 9* 38 1 36 3 32 9*  --  32 8*  --  39 2   < >  --    PLATELETS Thousands/uL 62* 77* 62* 62*  --  78*  --  88*   < >  --    POTASSIUM mmol/L 3 0* 3 2* 4 9 3 5  --  3 8   < > 4 2  --   --    CHLORIDE mmol/L 105 102 111* 108  --  104   < > 102  --   --    CO2 mmol/L 27 29 23 25  --  26   < > 24  --   --    BUN mg/dL 20 13 34* 32*  --  26*   < > 40*  --   --    CREATININE mg/dL 2 09* 1 61* 2 95* 2 85*  --  2 42*   < > 2 55*  --   --    CALCIUM mg/dL 8 6 8 5 9 0 8 4  --  8 6   < > 9 4  --   --    MAGNESIUM mg/dL  --   --   --   --   --   --   --  2 2  --   --    BLOOD CULTURE   --   --   --   --  No Growth at 72 hrs  No Growth at 72 hrs   --   --   --   --   --    LEUKOCYTES UA   --   --   --   --   --   --   --   --   --  Trace*   BLOOD UA   --   --   --   --   --   --   --   --   --  Small*    < > = values in this interval not displayed         Previous work up:  Please see previous notes

## 2020-07-12 NOTE — ASSESSMENT & PLAN NOTE
- of the right mid/lower lobes in the setting of progressive dementia and associated dysphagia - confirmed on CXR imaging  - appreciate speech/swallow input -> tolerating a pureed diet with thin liquids currently  - continue IV Cefepime/Flagyl  - COVID-19 testing on 7/5 negative  - remaining afebrile - procalcitonin mildly improved to 0 53 from prior peak - blood cultures from 7/5 are negative - repeat cultures drawn on 7/8 remain preliminarily negative  - long-term prognosis currently poor -> palliative care input appreciated

## 2020-07-12 NOTE — ASSESSMENT & PLAN NOTE
- resides at a locked dementia unit @ [facility]  - c/w Trazadone QHS  - delirium precautions  - appreciate geriatrics input  - appreciate palliative care input regarding goals of care - family requesting continued antibiotic treatment for pneumonia and hemodialysis for the time being pending further clinical decline - remains a DNR/DNI

## 2020-07-12 NOTE — PROGRESS NOTES
[facility] - Progress Note  Patient: Beto Comer 70 y o  female MRN: [id number]  [facility] Encounter: [id number]  Primary Care Provider: No primary care provider on file    Date Of Visit: 07/12/20        Assessment & Plan:    * Status post fall  Assessment & Plan  - unwitnessed fall out of bed at skilled facility  - CT of head and cervical spine unremarkable for hemorrhaging, fractures, or dislocations  - PT/OT as tolerated -> resides in a lock-down dementia unit however    Aspiration pneumonia   Assessment & Plan  - of the right mid/lower lobes in the setting of progressive dementia and associated dysphagia - confirmed on CXR imaging  - appreciate speech/swallow input -> tolerating a pureed diet with thin liquids currently  - continue IV Cefepime/Flagyl  - COVID-19 testing on 7/5 negative  - remaining afebrile - procalcitonin mildly improved to 0 53 from prior peak - blood cultures from 7/5 are negative - repeat cultures drawn on 7/8 remain preliminarily negative  - long-term prognosis currently poor -> palliative care input appreciated    Dementia  Assessment & Plan  - resides at a locked dementia unit @ [facility]  - c/w Trazadone QHS  - delirium precautions  - appreciate geriatrics input  - appreciate palliative care input regarding goals of care - family requesting continued antibiotic treatment for pneumonia and hemodialysis for the time being pending further clinical decline - remains a DNR/DNI    Acute rhabdomyolysis  Assessment & Plan  - secondary to fall  - CPK trend:  583 -> 526 -> 230 -> 195 yesterday  - now off IV fluids    ESRD (end stage renal disease)   Assessment & Plan  - continue routine hemodialysis per nephrology  - on Nephrocaps    Severe protein-calorie malnutrition  Assessment & Plan  - BMI of 15 97 in the setting of chronic illness coupled with decreased appetite/oral intake with evidence of muscle wasting/atrophy on exam  - on Nepro nutritional supplementation    Pancytopenia   Assessment & Plan  - monitor CBC and transfuse as necessary    Essential hypertension  Assessment & Plan  - resume PO Norvasc/Lopressor once consistently tolerating oral diet - c/w IV Lopressor Q6h for now  - PRN IV Hydralazine additionally on board for BP spikes    Seizures  Assessment & Plan  - continue Keppra  - seizure precautions    Rheumatoid arthritis  Assessment & Plan  - continue Plaquenil    Elevated troponin  Assessment & Plan  - likely a non-MI troponin elevation in the setting of fall, coupled with accelerated blood pressure on admission and mild rhabdomyolysis, w/ history of ESRD  - troponin peak of 0 08  - echocardiogram revealed normal EF with no evidence of LV regional wall motion abnormalities      DVT Prophylaxis:  Heparin SC       Patient Centered Rounds:  I have performed bedside rounds and discussed plan of care with nursing today  Discussions with Specialists or Other Care Team Provider:  see above assessments if applicable    Education and Discussions with Family / Patient:  Patient at bedside - daughter/significant other aware of plan    Time Spent for Care:  32 minutes  More than 50% of total time spent on counseling and coordination of care as described above  Current Length of Stay: 7 day(s)    Current Patient Status: Inpatient   Certification Statement:  Patient will continue to require additional hospital stay due to assessments as noted above  Code Status: Level 3 - DNAR and DNI        Subjective:     Seen/examined earlier today  She remains pleasantly demented at this time  Also remains generally weak/fatigued  Per nursing, she is tolerating her current diet          Objective:     Vitals:   Temp (24hrs), Av 3 °F (36 8 °C), Min:97 8 °F (36 6 °C), Max:99 1 °F (37 3 °C)    Temp:  [97 8 °F (36 6 °C)-99 1 °F (37 3 °C)] 99 1 °F (37 3 °C)  HR:  [68-80] 80  Resp:  [18] 18  BP: (135-150)/(65-75) 150/75  SpO2:  [96 %-97 %] 96 %  Body mass index is 16 42 kg/m²  Input and Output Summary (last 24 hours): Intake/Output Summary (Last 24 hours) at 7/12/2020 1654  Last data filed at 7/12/2020 1501  Gross per 24 hour   Intake 825 ml   Output 122 ml   Net 703 ml       Physical Exam:     GENERAL:  Frail/cachectic  HEAD:  Normocephalic - atraumatic - temporal wasting noted  EYES: PERRL - EOMI   MOUTH:  Mucosa moist  NECK:  Supple - full range of motion - clavicular wasting evident  CARDIAC:  Regular rate/rhythm - S1/S2 positive  PULMONARY:  Diminished to auscultation bilaterally - nonlabored respirations  ABDOMEN:  Soft - nontender/nondistended - active bowel sounds  MUSCULOSKELETAL:  Motor strength/range of motion deconditioned  NEUROLOGIC:  Baseline demented  SKIN:  Chronic wrinkles/blemishes         Additional Data:     Labs & Recent Cultures:    Results from last 7 days   Lab Units 07/12/20  0455   WBC Thousand/uL 2 50*   HEMOGLOBIN g/dL 10 4*   HEMATOCRIT % 32 9*   PLATELETS Thousands/uL 62*   NEUTROS PCT % 53   LYMPHS PCT % 38   MONOS PCT % 6   EOS PCT % 3     Results from last 7 days   Lab Units 07/12/20  0455   POTASSIUM mmol/L 3 0*   CHLORIDE mmol/L 105   CO2 mmol/L 27   BUN mg/dL 20   CREATININE mg/dL 2 09*   CALCIUM mg/dL 8 6         Results from last 7 days   Lab Units 07/12/20  1807 07/12/20  1205 07/12/20  0603 07/12/20  0004 07/11/20  1736 07/11/20  1146 07/11/20  0552 07/11/20  0001 07/10/20  1806 07/10/20  0728 07/10/20  0027 07/09/20  1757   POC GLUCOSE mg/dl 79 98 93 102 126 197* 132 162* 116 74 93 85         Results from last 7 days   Lab Units 07/11/20  0446 07/10/20  0653 07/09/20  0652 07/08/20  1112 07/07/20  0447   PROCALCITONIN ng/ml 0 53* 0 50* 0 71* 0 78* 0 41*         Results from last 7 days   Lab Units 07/08/20  1113 07/05/20  1940   BLOOD CULTURE  No Growth at 72 hrs  No Growth at 72 hrs    --    C DIFF TOXIN B   --  Negative         Last 24 Hours Medication List:     Current Facility-Administered Medications:  acetaminophen 975 mg Oral Q6H PRN Kristina Kim PA-C    b complex-vitamin C-folic acid 1 capsule Oral Daily With Carolynntianna Rolon PA-C    calcium carbonate 1,000 mg Oral Daily PRN Oumar Zaman PA-C    cefepime 1,000 mg Intravenous Q24H Bridgett Posadas MD Last Rate: Stopped (07/11/20 2203)   cholestyramine sugar free 4 g Oral BID Deng Zaman PA-C    folic acid 1 mg Oral Daily Deng Zaman PA-C    heparin (porcine) 5,000 Units Subcutaneous Q8H [facility] Deng Zaman PA-C    hydrALAZINE 5 mg Intravenous Q6H PRN Bridgett Posadas MD    hydroxychloroquine 200 mg Oral Daily With Breakfast Deng Zaman PA-C    levETIRAcetam 500 mg Intravenous Once per day on Sun Tue Thu Sat Oumar Zaman PA-C Last Rate: 500 mg (07/12/20 0850)   levETIRAcetam 750 mg Intravenous Once per day on Mon Wed Fri Yohana Pierce MD Last Rate: Stopped (07/10/20 1801)   lidocaine 1 patch Topical Daily Lindsey Flanagan DO    metoprolol 5 mg Intravenous Q6H Deng Zaman PA-C    metroNIDAZOLE 500 mg Intravenous Q8H Bridgett Posadas MD Last Rate: 500 mg (07/12/20 1354)   ondansetron 4 mg Intravenous Q6H PRN Deng Zaman PA-C    pantoprazole 40 mg Intravenous Q24H [facility] Bridgett Posadas MD    senna 1 tablet Oral Daily Deng Zaman PA-C    traZODone 50 mg Oral HS Deng Zaman PA-C                   ** Please Note: This note is constructed using a voice recognition dictation system  An occasional wrong word/phrase or sound-a-like substitution may have been picked up by dictation device due to the inherent limitations of voice recognition software  Read the chart carefully and recognize, using reasonable context, where substitutions may have occurred  **

## 2020-07-13 ENCOUNTER — APPOINTMENT (INPATIENT)
Dept: DIALYSIS | Facility: HOSPITAL | Age: 75
DRG: 557 | End: 2020-07-13
Attending: INTERNAL MEDICINE
Payer: MEDICARE

## 2020-07-13 LAB
ANION GAP SERPL CALCULATED.3IONS-SCNC: 8 MMOL/L (ref 4–13)
BACTERIA BLD CULT: NORMAL
BACTERIA BLD CULT: NORMAL
BASOPHILS # BLD AUTO: 0.02 THOUSANDS/ΜL (ref 0–0.1)
BASOPHILS NFR BLD AUTO: 1 % (ref 0–1)
BUN SERPL-MCNC: 24 MG/DL (ref 5–25)
CALCIUM SERPL-MCNC: 8.6 MG/DL (ref 8.3–10.1)
CHLORIDE SERPL-SCNC: 107 MMOL/L (ref 100–108)
CK SERPL-CCNC: 96 U/L (ref 26–192)
CO2 SERPL-SCNC: 23 MMOL/L (ref 21–32)
CREAT SERPL-MCNC: 2.41 MG/DL (ref 0.6–1.3)
EOSINOPHIL # BLD AUTO: 0.04 THOUSAND/ΜL (ref 0–0.61)
EOSINOPHIL NFR BLD AUTO: 1 % (ref 0–6)
ERYTHROCYTE [DISTWIDTH] IN BLOOD BY AUTOMATED COUNT: 16 % (ref 11.6–15.1)
GFR SERPL CREATININE-BSD FRML MDRD: 19 ML/MIN/1.73SQ M
GLUCOSE SERPL-MCNC: 105 MG/DL (ref 65–140)
GLUCOSE SERPL-MCNC: 106 MG/DL (ref 65–140)
GLUCOSE SERPL-MCNC: 107 MG/DL (ref 65–140)
GLUCOSE SERPL-MCNC: 61 MG/DL (ref 65–140)
GLUCOSE SERPL-MCNC: 76 MG/DL (ref 65–140)
GLUCOSE SERPL-MCNC: 91 MG/DL (ref 65–140)
GLUCOSE SERPL-MCNC: 98 MG/DL (ref 65–140)
HCT VFR BLD AUTO: 34.7 % (ref 34.8–46.1)
HGB BLD-MCNC: 10.8 G/DL (ref 11.5–15.4)
IMM GRANULOCYTES # BLD AUTO: 0.01 THOUSAND/UL (ref 0–0.2)
IMM GRANULOCYTES NFR BLD AUTO: 0 % (ref 0–2)
LYMPHOCYTES # BLD AUTO: 0.9 THOUSANDS/ΜL (ref 0.6–4.47)
LYMPHOCYTES NFR BLD AUTO: 29 % (ref 14–44)
MAGNESIUM SERPL-MCNC: 1.7 MG/DL (ref 1.6–2.6)
MCH RBC QN AUTO: 31.7 PG (ref 26.8–34.3)
MCHC RBC AUTO-ENTMCNC: 31.1 G/DL (ref 31.4–37.4)
MCV RBC AUTO: 102 FL (ref 82–98)
MONOCYTES # BLD AUTO: 0.16 THOUSAND/ΜL (ref 0.17–1.22)
MONOCYTES NFR BLD AUTO: 5 % (ref 4–12)
NEUTROPHILS # BLD AUTO: 2.03 THOUSANDS/ΜL (ref 1.85–7.62)
NEUTS SEG NFR BLD AUTO: 64 % (ref 43–75)
NRBC BLD AUTO-RTO: 0 /100 WBCS
PHOSPHATE SERPL-MCNC: 2.8 MG/DL (ref 2.3–4.1)
PLATELET # BLD AUTO: 68 THOUSANDS/UL (ref 149–390)
PMV BLD AUTO: 9.4 FL (ref 8.9–12.7)
POTASSIUM SERPL-SCNC: 4 MMOL/L (ref 3.5–5.3)
RBC # BLD AUTO: 3.41 MILLION/UL (ref 3.81–5.12)
SODIUM SERPL-SCNC: 138 MMOL/L (ref 136–145)
WBC # BLD AUTO: 3.16 THOUSAND/UL (ref 4.31–10.16)

## 2020-07-13 PROCEDURE — 82550 ASSAY OF CK (CPK): CPT | Performed by: INTERNAL MEDICINE

## 2020-07-13 PROCEDURE — 99232 SBSQ HOSP IP/OBS MODERATE 35: CPT | Performed by: INTERNAL MEDICINE

## 2020-07-13 PROCEDURE — 82948 REAGENT STRIP/BLOOD GLUCOSE: CPT

## 2020-07-13 PROCEDURE — 90935 HEMODIALYSIS ONE EVALUATION: CPT | Performed by: INTERNAL MEDICINE

## 2020-07-13 PROCEDURE — 85025 COMPLETE CBC W/AUTO DIFF WBC: CPT | Performed by: INTERNAL MEDICINE

## 2020-07-13 PROCEDURE — 83735 ASSAY OF MAGNESIUM: CPT | Performed by: INTERNAL MEDICINE

## 2020-07-13 PROCEDURE — 80048 BASIC METABOLIC PNL TOTAL CA: CPT | Performed by: INTERNAL MEDICINE

## 2020-07-13 PROCEDURE — C9113 INJ PANTOPRAZOLE SODIUM, VIA: HCPCS | Performed by: INTERNAL MEDICINE

## 2020-07-13 PROCEDURE — 84100 ASSAY OF PHOSPHORUS: CPT | Performed by: INTERNAL MEDICINE

## 2020-07-13 PROCEDURE — 36600 WITHDRAWAL OF ARTERIAL BLOOD: CPT

## 2020-07-13 RX ORDER — AMLODIPINE BESYLATE 2.5 MG/1
2.5 TABLET ORAL DAILY
Status: DISCONTINUED | OUTPATIENT
Start: 2020-07-13 | End: 2020-07-14

## 2020-07-13 RX ORDER — DEXTROSE MONOHYDRATE 25 G/50ML
INJECTION, SOLUTION INTRAVENOUS
Status: COMPLETED
Start: 2020-07-13 | End: 2020-07-13

## 2020-07-13 RX ORDER — LORAZEPAM 2 MG/ML
2 INJECTION INTRAMUSCULAR ONCE
Status: DISCONTINUED | OUTPATIENT
Start: 2020-07-14 | End: 2020-07-14 | Stop reason: HOSPADM

## 2020-07-13 RX ORDER — DEXTROSE MONOHYDRATE 25 G/50ML
25 INJECTION, SOLUTION INTRAVENOUS ONCE
Status: COMPLETED | OUTPATIENT
Start: 2020-07-13 | End: 2020-07-13

## 2020-07-13 RX ADMIN — METRONIDAZOLE 500 MG: 500 INJECTION, SOLUTION INTRAVENOUS at 13:35

## 2020-07-13 RX ADMIN — METOPROLOL TARTRATE 5 MG: 5 INJECTION INTRAVENOUS at 13:35

## 2020-07-13 RX ADMIN — LIDOCAINE 1 PATCH: 50 PATCH TOPICAL at 13:44

## 2020-07-13 RX ADMIN — DEXTROSE MONOHYDRATE 25 ML: 25 INJECTION, SOLUTION INTRAVENOUS at 13:32

## 2020-07-13 RX ADMIN — LEVETIRACETAM 750 MG: 100 INJECTION, SOLUTION INTRAVENOUS at 14:25

## 2020-07-13 RX ADMIN — HEPARIN SODIUM 5000 UNITS: 5000 INJECTION INTRAVENOUS; SUBCUTANEOUS at 21:42

## 2020-07-13 RX ADMIN — HEPARIN SODIUM 5000 UNITS: 5000 INJECTION INTRAVENOUS; SUBCUTANEOUS at 05:26

## 2020-07-13 RX ADMIN — METOPROLOL TARTRATE 5 MG: 5 INJECTION INTRAVENOUS at 17:29

## 2020-07-13 RX ADMIN — METOPROLOL TARTRATE 5 MG: 5 INJECTION INTRAVENOUS at 05:26

## 2020-07-13 RX ADMIN — METRONIDAZOLE 500 MG: 500 INJECTION, SOLUTION INTRAVENOUS at 21:42

## 2020-07-13 RX ADMIN — PANTOPRAZOLE SODIUM 40 MG: 40 INJECTION, POWDER, FOR SOLUTION INTRAVENOUS at 13:35

## 2020-07-13 RX ADMIN — CEFEPIME HYDROCHLORIDE 1000 MG: 1 INJECTION, POWDER, FOR SOLUTION INTRAMUSCULAR; INTRAVENOUS at 20:28

## 2020-07-13 RX ADMIN — METOPROLOL TARTRATE 5 MG: 5 INJECTION INTRAVENOUS at 23:34

## 2020-07-13 RX ADMIN — HEPARIN SODIUM 5000 UNITS: 5000 INJECTION INTRAVENOUS; SUBCUTANEOUS at 13:35

## 2020-07-13 RX ADMIN — METRONIDAZOLE 500 MG: 500 INJECTION, SOLUTION INTRAVENOUS at 05:26

## 2020-07-13 NOTE — ASSESSMENT & PLAN NOTE
- resides at a locked dementia unit @ [facility]  - c/w Trazadone QHS  - delirium precautions - waxing/waning episodes of mentation noted -> significant other and daughter understand this is progressive over time due to the nature of dementia  - appreciate geriatrics input  - appreciate palliative care input regarding goals of care - family requesting continued antibiotic treatment for pneumonia and hemodialysis for the time being pending further clinical decline - remains a DNR/DNI

## 2020-07-13 NOTE — PROGRESS NOTES
[facility] - Progress Note  Patient: Aquiles Jarrett 70 y o  female MRN: [id number]  [facility] Encounter: [id number]  Primary Care Provider: No primary care provider on file    Date Of Visit: 07/13/20        Assessment & Plan:    * Status post fall  Assessment & Plan  - unwitnessed fall out of bed at skilled facility  - CT of head and cervical spine unremarkable for hemorrhaging, fractures, or dislocations  - PT/OT as tolerated -> resides in a lock-down dementia unit however  - w/ improvement in mentation, anticipate discharge in 24-48 hours    Aspiration pneumonia   Assessment & Plan  - of the right mid/lower lobes in the setting of progressive dementia and associated dysphagia - confirmed on CXR imaging  - appreciate speech/swallow input -> tolerating a pureed diet with thin liquids currently  - continue IV Cefepime/Flagyl (day 6 of 7 today)   - COVID-19 testing on 7/5 negative  - remaining afebrile - procalcitonin mildly improved to 0 53 from prior peak - blood cultures from 7/5 are negative - repeat cultures drawn on 7/8 remain negative thus far  - long-term prognosis currently poor -> palliative care input appreciated    Dementia  Assessment & Plan  - resides at a locked dementia unit @ [facility]  - c/w Trazadone QHS  - delirium precautions - waxing/waning episodes of mentation noted -> significant other and daughter understand this is progressive over time due to the nature of dementia  - appreciate geriatrics input  - appreciate palliative care input regarding goals of care - family requesting continued antibiotic treatment for pneumonia and hemodialysis for the time being pending further clinical decline - remains a DNR/DNI    Acute rhabdomyolysis  Assessment & Plan  - secondary to fall  - CPK trend:  583 -> 526 -> 230 -> 195 on 7/11  - now off IV fluids    ESRD (end stage renal disease)   Assessment & Plan  - continue routine hemodialysis per nephrology  - on Nephrocaps    Severe protein-calorie malnutrition  Assessment & Plan  - BMI of 15 97 in the setting of chronic illness coupled with decreased appetite/oral intake with evidence of muscle wasting/atrophy on exam  - on Nepro nutritional supplementation    Pancytopenia   Assessment & Plan  - monitor CBC and transfuse as necessary    Essential hypertension  Assessment & Plan  - resume PO Norvasc/Lopressor once consistently tolerating oral diet - c/w IV Lopressor Q6h for now  - PRN IV Hydralazine additionally on board for BP spikes    Seizures  Assessment & Plan  - continue Keppra  - seizure precautions    Rheumatoid arthritis  Assessment & Plan  - continue Plaquenil    Elevated troponin  Assessment & Plan  - likely a non-MI troponin elevation in the setting of fall, coupled with accelerated blood pressure on admission and mild rhabdomyolysis, w/ history of ESRD  - troponin peak of 0 08  - echocardiogram revealed normal EF with no evidence of LV regional wall motion abnormalities      DVT Prophylaxis:  Heparin SC       Patient Centered Rounds:  I have performed bedside rounds and discussed plan of care with nursing today  Discussions with Specialists or Other Care Team Provider:  see above assessments if applicable    Education and Discussions with Family / Patient:  Significant other and daughter aware of plan, and are understanding of the progressive decline with chronic dementia  Time Spent for Care:  32 minutes  More than 50% of total time spent on counseling and coordination of care as described above  Current Length of Stay: 8 day(s)    Current Patient Status: Inpatient   Certification Statement:  Patient will continue to require additional hospital stay due to assessments as noted above  Code Status: Level 3 - DNAR and DNI        Subjective:     Seen/examined earlier in the day while undergoing dialysis  She remains weak/fatigued           Objective:     Vitals:   Temp (24hrs), Av 9 °F (37 2 °C), Min:97 7 °F (36 5 °C), Max:100 °F (37 8 °C)    Temp:  [97 7 °F (36 5 °C)-100 °F (37 8 °C)] 99 8 °F (37 7 °C)  HR:  [] 78  Resp:  [17-18] 18  BP: (136-204)/() 161/74  SpO2:  [95 %-98 %] 95 %  Body mass index is 16 08 kg/m²  Input and Output Summary (last 24 hours):        Intake/Output Summary (Last 24 hours) at 7/13/2020 1816  Last data filed at 7/13/2020 1501  Gross per 24 hour   Intake 700 ml   Output 1437 ml   Net -737 ml       Physical Exam:     GENERAL:  Frail/cachectic  HEAD:  Normocephalic - atraumatic - temporal wasting evident  EYES: PERRL - EOMI   MOUTH:  Mucosa moist  NECK:  Supple - full range of motion - clavicular wasting noted  CARDIAC:  Regular rate/rhythm - S1/S2 positive  PULMONARY:  Mildly diminished at the bases - nonlabored respirations at rest  ABDOMEN:  Soft - nontender/nondistended - active bowel sounds  MUSCULOSKELETAL:  Motor strength/range of motion deconditioned  NEUROLOGIC:  Baseline demented  SKIN:  Chronic wrinkles/blemishes       Additional Data:     Labs & Recent Cultures:    Results from last 7 days   Lab Units 07/13/20  0852   WBC Thousand/uL 3 16*   HEMOGLOBIN g/dL 10 8*   HEMATOCRIT % 34 7*   PLATELETS Thousands/uL 68*   NEUTROS PCT % 64   LYMPHS PCT % 29   MONOS PCT % 5   EOS PCT % 1     Results from last 7 days   Lab Units 07/13/20  0852   POTASSIUM mmol/L 4 0   CHLORIDE mmol/L 107   CO2 mmol/L 23   BUN mg/dL 24   CREATININE mg/dL 2 41*   CALCIUM mg/dL 8 6         Results from last 7 days   Lab Units 07/13/20  1659 07/13/20  1402 07/13/20  1319 07/13/20  0545 07/12/20  2358 07/12/20  1807 07/12/20  1205 07/12/20  0603 07/12/20  0004 07/11/20  1736 07/11/20  1146 07/11/20  0552   POC GLUCOSE mg/dl 98 106 61* 76 91 79 98 93 102 126 197* 132         Results from last 7 days   Lab Units 07/11/20  0446 07/10/20  0653 07/09/20  0652 07/08/20  1112 07/07/20  0447   PROCALCITONIN ng/ml 0 53* 0 50* 0 71* 0 78* 0 41*         Results from last 7 days   Lab Units 07/08/20  1113   BLOOD CULTURE  No Growth After 4 Days  No Growth After 4 Days  Last 24 Hours Medication List:     Current Facility-Administered Medications:  acetaminophen 975 mg Oral Q6H PRN Kristina Kim PA-C    amLODIPine 2 5 mg Oral Daily OddMD diana Arredondo complex-vitamin C-folic acid 1 capsule Oral Daily With Eda Chaves PA-C    calcium carbonate 1,000 mg Oral Daily PRN Jose Carlos Zaman PA-C    cefepime 1,000 mg Intravenous Q24H Milady Christensen MD Last Rate: 1,000 mg (07/12/20 2038)   cholestyramine sugar free 4 g Oral BID Deng Zaman PA-C    folic acid 1 mg Oral Daily Deng Zaman PA-C    heparin (porcine) 5,000 Units Subcutaneous Q8H [facility] Deng Zaman PA-C    hydrALAZINE 5 mg Intravenous Q6H PRN Milady Christensen MD    hydroxychloroquine 200 mg Oral Daily With Breakfast Deng Zaman PA-C    levETIRAcetam 500 mg Intravenous Once per day on Sun Tue Thu Sat Jose Carlos Zaman PA-C Last Rate: 500 mg (07/12/20 0850)   levETIRAcetam 750 mg Intravenous Once per day on Mon Wed Fri Shawn Sales MD Last Rate: Stopped (07/13/20 1501)   lidocaine 1 patch Topical Daily Lindsey Flanagan DO    metoprolol 5 mg Intravenous Q6H Deng Zaman PA-C    metroNIDAZOLE 500 mg Intravenous Q8H Milady Christensen MD Last Rate: Stopped (07/13/20 1425)   ondansetron 4 mg Intravenous Q6H PRN Deng Zaman PA-C    pantoprazole 40 mg Intravenous Q24H [facility] Milady Christensen MD    senna 1 tablet Oral Daily Deng Zaman PA-C    traZODone 50 mg Oral HS Deng Zaman PA-C                 ** Please Note: This note is constructed using a voice recognition dictation system  An occasional wrong word/phrase or sound-a-like substitution may have been picked up by dictation device due to the inherent limitations of voice recognition software  Read the chart carefully and recognize, using reasonable context, where substitutions may have occurred  **

## 2020-07-13 NOTE — PLAN OF CARE
Problem: Nutrition/Hydration-ADULT  Goal: Nutrient/Hydration intake appropriate for improving, restoring or maintaining nutritional needs  Description  Monitor and assess patient's nutrition/hydration status for malnutrition  Collaborate with interdisciplinary team and initiate plan and interventions as ordered  Monitor patient's weight and dietary intake as ordered or per policy  Utilize nutrition screening tool and intervene as necessary  Determine patient's food preferences and provide high-protein, high-caloric foods as appropriate       INTERVENTIONS:  - Monitor oral intake, urinary output, labs, and treatment plans  - Assess nutrition and hydration status and recommend course of action  - Evaluate amount of meals eaten  - Assist patient with eating if necessary   - Allow adequate time for meals  - Recommend/ encourage appropriate diets, oral nutritional supplements, and vitamin/mineral supplements  - Order, calculate, and assess calorie counts as needed  - Recommend, monitor, and adjust tube feedings and TPN/PPN based on assessed needs  - Assess need for intravenous fluids  - Provide specific nutrition/hydration education as appropriate  - Include patient/family/caregiver in decisions related to nutrition  Outcome: Not Progressing  Note:   Mostly 0-25% meal completion

## 2020-07-13 NOTE — ASSESSMENT & PLAN NOTE
- unwitnessed fall out of bed at skilled facility  - CT of head and cervical spine unremarkable for hemorrhaging, fractures, or dislocations  - PT/OT as tolerated -> resides in a lock-down dementia unit however  - w/ improvement in mentation, anticipate discharge in 24-48 hours

## 2020-07-13 NOTE — PHYSICAL THERAPY NOTE
Physical Therapy Cancellation Note    PT orders received for PT re-evaluation  Pt was evaluated by PT on 7/6 and was Mod/Max Ax2 for transfers  Pt attempted to be seen for PT re-evaluation  Pt presents extremely lethargic, not opening eyes w/ verbal or tactile stimulation, and unable to follow any commands  Pt not appropriate to participate in PT re-evaluation at this time  Will continue to follow and attempt to see pt as able      Nannette Cochran, PT, DPT

## 2020-07-13 NOTE — PLAN OF CARE
Tx initiated without difficulty  Catheter patent  BFR at 400   1 l fluid removal as tolerated      Pre HD weight 42 8 kg on bed scale  Problem: METABOLIC, FLUID AND ELECTROLYTES - ADULT  Goal: Electrolytes maintained within normal limits  Description  INTERVENTIONS:  - Monitor labs and assess patient for signs and symptoms of electrolyte imbalances  - Administer electrolyte replacement as ordered  - Monitor response to electrolyte replacements, including repeat lab results as appropriate  - Instruct patient on fluid and nutrition as appropriate  Outcome: Progressing  Goal: Fluid balance maintained  Description  INTERVENTIONS:  - Monitor labs   - Monitor I/O and WT  - Instruct patient on fluid and nutrition as appropriate  - Assess for signs & symptoms of volume excess or deficit  Outcome: Progressing

## 2020-07-13 NOTE — PROGRESS NOTES
Follow-up:  Pt continues with very limited PO intake; level 3 status noted;   could consider short term nutrition intervention via NG route/ tube feeding, if in accordance with overall care goals and consult RD for recommendations

## 2020-07-13 NOTE — PROGRESS NOTES
[address] [facility] NOTE   Aquiles Jarrett 70 y o  female MRN: [id number]  [facility] Encounter: [id number]  Reason for Consult: ESRD    ASSESSMENT and PLAN:    71 yo female with PMHx of ESRD, HTN, dementia who presents with fall from SNF facility  Nephrology on board for ESRD     1) ESRD     - ESRD - started on dialysis 2019 per daughter  - access is [facility]  - 7/8-dialysis held due to patient's poor clinical status  - 7/9 - no urgent indication for RRT  Give IVF  - 7/10 - HD with no UF  - 7/13 - HD 1 L UF     Plan:     - cont keppra dosed after dialysis on dialysis days  - HD today - seen on dialysis - 3 5 hours; Na 140; bicarb 35; F160; ; 1 L UF  - restart amlodipine  - consider conversion of IV metoprolol to oral metoprolol  - monitor BP post UF/HD today     2) fall     - CT head unrevealing of acute process  - CT spine without acute fracture  Degenerative changes  - CXR with increased opacity - being monitored by Primary team  - CPK improving as of 7/6     3) dementia     - per Primary team     4) electrolytes - stable 7/13     5) acid/base - stable 7/13     6) MBD - monitor PTH as outpatient     7) HTN     - on IV metoprolol  - holding amlodipine     8) Anemia     - no RUFUS due to CVA chronic noted on CT head     9) thrombocytopenia - unclear etiology or chronicity - per Primary team     10) fevers     - possible aspiration PNA    SUBJECTIVE / INTERVAL HISTORY:    SBP 110K systolic  Pt seen on dialysis  Tolerating treatment  Arousable  But not answering questions appropriately       OBJECTIVE:  Current Weight: Weight - Scale: 42 5 kg (93 lb 11 1 oz)  Vitals:    07/13/20 1130 07/13/20 1200 07/13/20 1300 07/13/20 1446   BP: (!) 172/102 136/100 (!) 176/79 (!) 177/78   BP Location:       Pulse: 80 105 81 76   Resp: 18 18 18    Temp:   98 7 °F (37 1 °C) 99 8 °F (37 7 °C)   TempSrc:       SpO2:   96% 98%   Weight:       Height:           Intake/Output Summary (Last 24 hours) at 7/13/2020 1505  Last data filed at 7/13/2020 1336  Gross per 24 hour   Intake 200 ml   Output 0 ml   Net 200 ml     General: cachectic  Skin: no rash  Eyes: anicteric sclera  Neck: supple  Chest: CTA b/l, no ronchii, no wheeze, no rubs, diminished air take b/l  CVS: s1s2, no murmur, no gallop, no rub  Abdomen: soft, nontender, nl sounds  Extremities: no edema LE b/l  : no jerome  Neuro: AAOX2  Psych: normal affect      Medications:    Current Facility-Administered Medications:     acetaminophen (TYLENOL) tablet 975 mg, 975 mg, Oral, Q6H PRN, RYAN Jean Baptiste complex-vitamin C-folic acid (NEPHROCAPS) capsule 1 capsule, 1 capsule, Oral, Daily With Dinner, Patria Zaman PA-C, 1 capsule at 07/11/20 1732    calcium carbonate (TUMS) chewable tablet 1,000 mg, 1,000 mg, Oral, Daily PRN, Deng Zaman PA-C    cefepime (MAXIPIME) 1,000 mg in dextrose 5 % 50 mL IVPB, 1,000 mg, Intravenous, Q24H, Bimal Miles MD, Last Rate: 100 mL/hr at 07/12/20 2038, 1,000 mg at 07/12/20 2038    cholestyramine sugar free (QUESTRAN LIGHT) packet 4 g, 4 g, Oral, BID, Deng Zaman PA-C, 4 g at 87/09/63 6806    folic acid (FOLVITE) tablet 1 mg, 1 mg, Oral, Daily, Deng Zaman PA-C, 1 mg at 07/12/20 3093    heparin (porcine) subcutaneous injection 5,000 Units, 5,000 Units, Subcutaneous, Q8H [address], 5,000 Units at 07/13/20 1335 **AND** [CANCELED] Platelet count, , , Once, Deng Zaman PA-C    hydrALAZINE (APRESOLINE) injection 5 mg, 5 mg, Intravenous, Q6H PRN, Bimal Miles MD    hydroxychloroquine (PLAQUENIL) tablet 200 mg, 200 mg, Oral, Daily With Breakfast, Deng Zaman PA-C, 200 mg at 07/12/20 0831    levETIRAcetam (KEPPRA) 500 mg in sodium chloride 0 9 % 100 mL IVPB, 500 mg, Intravenous, Once per day on Sun Tue Thu Sat, Deng Zaman PA-C, Last Rate: 400 mL/hr at 07/12/20 0850, 500 mg at 07/12/20 0850    levETIRAcetam (KEPPRA) 750 mg in sodium chloride 0 9 % 100 mL IVPB, 750 mg, Intravenous, Once per day on Mon Wed Fri, Ashley Thornton MD, Last Rate: 400 mL/hr at 07/13/20 1425, 750 mg at 07/13/20 1425    lidocaine (LIDODERM) 5 % patch 1 patch, 1 patch, Topical, Daily, Lindsey Flanagan DO, 1 patch at 07/13/20 1344    metoprolol (LOPRESSOR) injection 5 mg, 5 mg, Intravenous, Q6H, Deng Zaman PA-C, 5 mg at 07/13/20 1335    metroNIDAZOLE (FLAGYL) IVPB (premix) 500 mg 100 mL, 500 mg, Intravenous, Q8H, Jeff Casanova MD, Last Rate: 200 mL/hr at 07/13/20 1335, 500 mg at 07/13/20 1335    ondansetron (ZOFRAN) injection 4 mg, 4 mg, Intravenous, Q6H PRN, Deng Zaman PA-C    pantoprazole (PROTONIX) injection 40 mg, 40 mg, Intravenous, Q24H [facility], Jeff Casanova MD, 40 mg at 07/13/20 1335    senna (SENOKOT) tablet 8 6 mg, 1 tablet, Oral, Daily, Deng Zaman PA-C, 8 6 mg at 07/12/20 6673    traZODone (DESYREL) tablet 50 mg, 50 mg, Oral, HS, Deng Zaman PA-C, 50 mg at 07/11/20 2100    Laboratory Results:  Results from last 7 days   Lab Units 07/13/20  0852 07/12/20  0455 07/11/20  0446 07/10/20  0653 07/09/20  0606 07/08/20  0453 07/07/20  0446   WBC Thousand/uL 3 16* 2 50* 4 03* 3 05* 3 19* 6 01  --    HEMOGLOBIN g/dL 10 8* 10 4* 12 0 10 8* 10 1* 10 3*  --    HEMATOCRIT % 34 7* 32 9* 38 1 36 3 32 9* 32 8*  --    PLATELETS Thousands/uL 68* 62* 77* 62* 62* 78*  --    POTASSIUM mmol/L 4 0 3 0* 3 2* 4 9 3 5 3 8 4 2   CHLORIDE mmol/L 107 105 102 111* 108 104 103   CO2 mmol/L 23 27 29 23 25 26 25   BUN mg/dL 24 20 13 34* 32* 26* 17   CREATININE mg/dL 2 41* 2 09* 1 61* 2 95* 2 85* 2 42* 1 41*   CALCIUM mg/dL 8 6 8 6 8 5 9 0 8 4 8 6 9 1   MAGNESIUM mg/dL 1 7  --   --   --   --   --   --    PHOSPHORUS mg/dL 2 8  --   --   --   --   --   --

## 2020-07-13 NOTE — SPEECH THERAPY NOTE
F/U at meal planned to ensure tolerance of oral diet  Pt with limited responsiveness and unable3 to participate in feeding trial at this time (s/p HD)  Plan: f/u Tues

## 2020-07-13 NOTE — OCCUPATIONAL THERAPY NOTE
OT CANCEL NOTE:    Received orders for OT re-evaluation for return to [facility] locked dementia unit  Pt was evaluated by OT on 7/6, and dc'd due to not being able to follow directions, 1:1 sitter, and b/l christel  Attempted to see patient this afternoon, however, pt is very lethargic, does not open eyes, occasional facial grimacing when spoken to  Pt is not able to participate in OT re-evaluation at this time  Anticipate that pt  Remains max/total assist w all self care

## 2020-07-13 NOTE — PLAN OF CARE
Problem: Prexisting or High Potential for Compromised Skin Integrity  Goal: Skin integrity is maintained or improved  Description  INTERVENTIONS:  - Identify patients at risk for skin breakdown  - Assess and monitor skin integrity  - Assess and monitor nutrition and hydration status  - Monitor labs   - Assess for incontinence   - Turn and reposition patient  - Assist with mobility/ambulation  - Relieve pressure over bony prominences  - Avoid friction and shearing  - Provide appropriate hygiene as needed including keeping skin clean and dry  - Evaluate need for skin moisturizer/barrier cream  - Collaborate with interdisciplinary team   - Patient/family teaching  - Consider wound care consult   Outcome: Progressing     Problem: Potential for Falls  Goal: Patient will remain free of falls  Description  INTERVENTIONS:  - Assess patient frequently for physical needs  -  Identify cognitive and physical deficits and behaviors that affect risk of falls    -  [place] fall precautions as indicated by assessment   - Educate patient/family on patient safety including physical limitations  - Instruct patient to call for assistance with activity based on assessment  - Modify environment to reduce risk of injury  - Consider OT/PT consult to assist with strengthening/mobility  Outcome: Progressing     Problem: PAIN - ADULT  Goal: Verbalizes/displays adequate comfort level or baseline comfort level  Description  Interventions:  - Encourage patient to monitor pain and request assistance  - Assess pain using appropriate pain scale  - Administer analgesics based on type and severity of pain and evaluate response  - Implement non-pharmacological measures as appropriate and evaluate response  - Consider cultural and social influences on pain and pain management  - Notify physician/advanced practitioner if interventions unsuccessful or patient reports new pain  Outcome: Progressing     Problem: SAFETY ADULT  Goal: Maintain or return to baseline ADL function  Description  INTERVENTIONS:  -  Assess patient's ability to carry out ADLs; assess patient's baseline for ADL function and identify physical deficits which impact ability to perform ADLs (bathing, care of mouth/teeth, toileting, grooming, dressing, etc )  - Assess/evaluate cause of self-care deficits   - Assess range of motion  - Assess patient's mobility; develop plan if impaired  - Assess patient's need for assistive devices and provide as appropriate  - Encourage maximum independence but intervene and supervise when necessary  - Involve family in performance of ADLs  - Assess for home care needs following discharge   - Consider OT consult to assist with ADL evaluation and planning for discharge  - Provide patient education as appropriate  Outcome: Progressing  Goal: Maintain or return mobility status to optimal level  Description  INTERVENTIONS:  - Assess patient's baseline mobility status (ambulation, transfers, stairs, etc )    - Identify cognitive and physical deficits and behaviors that affect mobility  - Identify mobility aids required to assist with transfers and/or ambulation (gait belt, sit-to-stand, lift, walker, cane, etc )  - [place] fall precautions as indicated by assessment  - Record patient progress and toleration of activity level on Mobility SBAR; progress patient to next Phase/Stage  - Instruct patient to call for assistance with activity based on assessment  - Consider rehabilitation consult to assist with strengthening/weightbearing, etc   Outcome: Progressing     Problem: DISCHARGE PLANNING  Goal: Discharge to home or other facility with appropriate resources  Description  INTERVENTIONS:  - Identify barriers to discharge w/patient and caregiver  - Arrange for needed discharge resources and transportation as appropriate  - Identify discharge learning needs (meds, wound care, etc )  - Arrange for interpretive services to assist at discharge as needed  - Refer to Case Management Department for coordinating discharge planning if the patient needs post-hospital services based on physician/advanced practitioner order or complex needs related to functional status, cognitive ability, or social support system  Outcome: Progressing     Problem: Knowledge Deficit  Goal: Patient/family/caregiver demonstrates understanding of disease process, treatment plan, medications, and discharge instructions  Description  Complete learning assessment and assess knowledge base  Interventions:  - Provide teaching at level of understanding  - Provide teaching via preferred learning methods  Outcome: Progressing     Problem: Nutrition/Hydration-ADULT  Goal: Nutrient/Hydration intake appropriate for improving, restoring or maintaining nutritional needs  Description  Monitor and assess patient's nutrition/hydration status for malnutrition  Collaborate with interdisciplinary team and initiate plan and interventions as ordered  Monitor patient's weight and dietary intake as ordered or per policy  Utilize nutrition screening tool and intervene as necessary  Determine patient's food preferences and provide high-protein, high-caloric foods as appropriate       INTERVENTIONS:  - Monitor oral intake, urinary output, labs, and treatment plans  - Assess nutrition and hydration status and recommend course of action  - Evaluate amount of meals eaten  - Assist patient with eating if necessary   - Allow adequate time for meals  - Recommend/ encourage appropriate diets, oral nutritional supplements, and vitamin/mineral supplements  - Order, calculate, and assess calorie counts as needed  - Recommend, monitor, and adjust tube feedings and TPN/PPN based on assessed needs  - Assess need for intravenous fluids  - Provide specific nutrition/hydration education as appropriate  - Include patient/family/caregiver in decisions related to nutrition  Outcome: Progressing     Problem: METABOLIC, FLUID AND ELECTROLYTES - ADULT  Goal: Electrolytes maintained within normal limits  Description  INTERVENTIONS:  - Monitor labs and assess patient for signs and symptoms of electrolyte imbalances  - Administer electrolyte replacement as ordered  - Monitor response to electrolyte replacements, including repeat lab results as appropriate  - Instruct patient on fluid and nutrition as appropriate  Outcome: Progressing  Goal: Fluid balance maintained  Description  INTERVENTIONS:  - Monitor labs   - Monitor I/O and WT  - Instruct patient on fluid and nutrition as appropriate  - Assess for signs & symptoms of volume excess or deficit  Outcome: Progressing

## 2020-07-13 NOTE — MALNUTRITION/BMI
This medical record reflects one or more clinical indicators suggestive of malnutrition and/or morbid obesity  Malnutrition Findings:   Malnutrition type: Chronic illness  Degree of Malnutrition: Other severe protein calorie malnutrition  Malnutrition Characteristics: Muscle loss, Fat loss(severe fat and muscle loss noted at temporals, orbitals, cheeks, clavicles and shoulder; tx with oral diet as pt can tolerate and nutrition supplements)    BMI Findings:  BMI Classifications: Underweight < 18 5     Body mass index is 16 08 kg/m²  See Nutrition note dated 7/8/13 and 7/13/20 for additional details  Completed nutrition assessment is viewable in the nutrition documentation

## 2020-07-13 NOTE — HEMODIALYSIS
Hemodialysis completed    Right permacath maintains 400 bfr   -volume removal 1437 ml  -pre weight 42 8 kg  -post weight 41 5 kg

## 2020-07-13 NOTE — PLAN OF CARE
Problem: Prexisting or High Potential for Compromised Skin Integrity  Goal: Skin integrity is maintained or improved  Description  INTERVENTIONS:  - Identify patients at risk for skin breakdown  - Assess and monitor skin integrity  - Assess and monitor nutrition and hydration status  - Monitor labs   - Assess for incontinence   - Turn and reposition patient  - Assist with mobility/ambulation  - Relieve pressure over bony prominences  - Avoid friction and shearing  - Provide appropriate hygiene as needed including keeping skin clean and dry  - Evaluate need for skin moisturizer/barrier cream  - Collaborate with interdisciplinary team   - Patient/family teaching  - Consider wound care consult   Outcome: Progressing     Problem: Potential for Falls  Goal: Patient will remain free of falls  Description  INTERVENTIONS:  - Assess patient frequently for physical needs  -  Identify cognitive and physical deficits and behaviors that affect risk of falls    -  [place] fall precautions as indicated by assessment   - Educate patient/family on patient safety including physical limitations  - Instruct patient to call for assistance with activity based on assessment  - Modify environment to reduce risk of injury  - Consider OT/PT consult to assist with strengthening/mobility  Outcome: Progressing     Problem: PAIN - ADULT  Goal: Verbalizes/displays adequate comfort level or baseline comfort level  Description  Interventions:  - Encourage patient to monitor pain and request assistance  - Assess pain using appropriate pain scale  - Administer analgesics based on type and severity of pain and evaluate response  - Implement non-pharmacological measures as appropriate and evaluate response  - Consider cultural and social influences on pain and pain management  - Notify physician/advanced practitioner if interventions unsuccessful or patient reports new pain  Outcome: Progressing     Problem: SAFETY ADULT  Goal: Maintain or return to baseline ADL function  Description  INTERVENTIONS:  -  Assess patient's ability to carry out ADLs; assess patient's baseline for ADL function and identify physical deficits which impact ability to perform ADLs (bathing, care of mouth/teeth, toileting, grooming, dressing, etc )  - Assess/evaluate cause of self-care deficits   - Assess range of motion  - Assess patient's mobility; develop plan if impaired  - Assess patient's need for assistive devices and provide as appropriate  - Encourage maximum independence but intervene and supervise when necessary  - Involve family in performance of ADLs  - Assess for home care needs following discharge   - Consider OT consult to assist with ADL evaluation and planning for discharge  - Provide patient education as appropriate  Outcome: Progressing  Goal: Maintain or return mobility status to optimal level  Description  INTERVENTIONS:  - Assess patient's baseline mobility status (ambulation, transfers, stairs, etc )    - Identify cognitive and physical deficits and behaviors that affect mobility  - Identify mobility aids required to assist with transfers and/or ambulation (gait belt, sit-to-stand, lift, walker, cane, etc )  - [place] fall precautions as indicated by assessment  - Record patient progress and toleration of activity level on Mobility SBAR; progress patient to next Phase/Stage  - Instruct patient to call for assistance with activity based on assessment  - Consider rehabilitation consult to assist with strengthening/weightbearing, etc   Outcome: Progressing     Problem: DISCHARGE PLANNING  Goal: Discharge to home or other facility with appropriate resources  Description  INTERVENTIONS:  - Identify barriers to discharge w/patient and caregiver  - Arrange for needed discharge resources and transportation as appropriate  - Identify discharge learning needs (meds, wound care, etc )  - Arrange for interpretive services to assist at discharge as needed  - Refer to Case Management Department for coordinating discharge planning if the patient needs post-hospital services based on physician/advanced practitioner order or complex needs related to functional status, cognitive ability, or social support system  Outcome: Progressing     Problem: Knowledge Deficit  Goal: Patient/family/caregiver demonstrates understanding of disease process, treatment plan, medications, and discharge instructions  Description  Complete learning assessment and assess knowledge base  Interventions:  - Provide teaching at level of understanding  - Provide teaching via preferred learning methods  Outcome: Progressing     Problem: Nutrition/Hydration-ADULT  Goal: Nutrient/Hydration intake appropriate for improving, restoring or maintaining nutritional needs  Description  Monitor and assess patient's nutrition/hydration status for malnutrition  Collaborate with interdisciplinary team and initiate plan and interventions as ordered  Monitor patient's weight and dietary intake as ordered or per policy  Utilize nutrition screening tool and intervene as necessary  Determine patient's food preferences and provide high-protein, high-caloric foods as appropriate       INTERVENTIONS:  - Monitor oral intake, urinary output, labs, and treatment plans  - Assess nutrition and hydration status and recommend course of action  - Evaluate amount of meals eaten  - Assist patient with eating if necessary   - Allow adequate time for meals  - Recommend/ encourage appropriate diets, oral nutritional supplements, and vitamin/mineral supplements  - Order, calculate, and assess calorie counts as needed  - Recommend, monitor, and adjust tube feedings and TPN/PPN based on assessed needs  - Assess need for intravenous fluids  - Provide specific nutrition/hydration education as appropriate  - Include patient/family/caregiver in decisions related to nutrition  Outcome: Progressing     Problem: SAFETY,RESTRAINT: NV/NON-SELF DESTRUCTIVE BEHAVIOR  Goal: Remains free of harm/injury (restraint for non violent/non self-detsructive behavior)  Description  INTERVENTIONS:  - Instruct patient/family regarding restraint use   - Assess and monitor physiologic and psychological status   - Provide interventions and comfort measures to meet assessed patient needs   - Identify and implement measures to help patient regain control  - Assess readiness for release of restraint   Outcome: Progressing  Goal: Returns to optimal restraint-free functioning  Description  INTERVENTIONS:  - Assess the patient's behavior and symptoms that indicate continued need for restraint  - Identify and implement measures to help patient regain control  - Assess readiness for release of restraint   Outcome: Progressing     Problem: METABOLIC, FLUID AND ELECTROLYTES - ADULT  Goal: Electrolytes maintained within normal limits  Description  INTERVENTIONS:  - Monitor labs and assess patient for signs and symptoms of electrolyte imbalances  - Administer electrolyte replacement as ordered  - Monitor response to electrolyte replacements, including repeat lab results as appropriate  - Instruct patient on fluid and nutrition as appropriate  Outcome: Progressing  Goal: Fluid balance maintained  Description  INTERVENTIONS:  - Monitor labs   - Monitor I/O and WT  - Instruct patient on fluid and nutrition as appropriate  - Assess for signs & symptoms of volume excess or deficit  Outcome: Progressing

## 2020-07-13 NOTE — ASSESSMENT & PLAN NOTE
- of the right mid/lower lobes in the setting of progressive dementia and associated dysphagia - confirmed on CXR imaging  - appreciate speech/swallow input -> tolerating a pureed diet with thin liquids currently  - continue IV Cefepime/Flagyl (day 6 of 7 today)   - COVID-19 testing on 7/5 negative  - remaining afebrile - procalcitonin mildly improved to 0 53 from prior peak - blood cultures from 7/5 are negative - repeat cultures drawn on 7/8 remain negative thus far  - long-term prognosis currently poor -> palliative care input appreciated

## 2020-07-14 VITALS
WEIGHT: 101.19 LBS | BODY MASS INDEX: 17.28 KG/M2 | SYSTOLIC BLOOD PRESSURE: 96 MMHG | RESPIRATION RATE: 24 BRPM | HEIGHT: 64 IN | OXYGEN SATURATION: 100 % | DIASTOLIC BLOOD PRESSURE: 48 MMHG | TEMPERATURE: 98.3 F | HEART RATE: 63 BPM

## 2020-07-14 LAB
ANION GAP SERPL CALCULATED.3IONS-SCNC: 7 MMOL/L (ref 4–13)
BASOPHILS # BLD AUTO: 0.01 THOUSANDS/ΜL (ref 0–0.1)
BASOPHILS NFR BLD AUTO: 0 % (ref 0–1)
BUN SERPL-MCNC: 12 MG/DL (ref 5–25)
CA-I BLD-SCNC: 1.34 MMOL/L (ref 1.12–1.32)
CALCIUM SERPL-MCNC: 9 MG/DL (ref 8.3–10.1)
CHLORIDE SERPL-SCNC: 102 MMOL/L (ref 100–108)
CO2 SERPL-SCNC: 28 MMOL/L (ref 21–32)
CREAT SERPL-MCNC: 1.71 MG/DL (ref 0.6–1.3)
EOSINOPHIL # BLD AUTO: 0 THOUSAND/ΜL (ref 0–0.61)
EOSINOPHIL NFR BLD AUTO: 0 % (ref 0–6)
ERYTHROCYTE [DISTWIDTH] IN BLOOD BY AUTOMATED COUNT: 16.2 % (ref 11.6–15.1)
FIO2 GAS DIL.REBREATH: 100 L
GFR SERPL CREATININE-BSD FRML MDRD: 29 ML/MIN/1.73SQ M
GLUCOSE SERPL-MCNC: 112 MG/DL (ref 65–140)
GLUCOSE SERPL-MCNC: 127 MG/DL (ref 65–140)
GLUCOSE SERPL-MCNC: 99 MG/DL (ref 65–140)
HCT VFR BLD AUTO: 40.9 % (ref 34.8–46.1)
HCT VFR BLD CALC: 39 % (ref 34.8–46.1)
HGB BLD-MCNC: 12.4 G/DL (ref 11.5–15.4)
HGB BLDA-MCNC: 13.3 G/DL (ref 11.5–15.4)
IMM GRANULOCYTES # BLD AUTO: 0.03 THOUSAND/UL (ref 0–0.2)
IMM GRANULOCYTES NFR BLD AUTO: 1 % (ref 0–2)
LYMPHOCYTES # BLD AUTO: 0.55 THOUSANDS/ΜL (ref 0.6–4.47)
LYMPHOCYTES NFR BLD AUTO: 10 % (ref 14–44)
MAGNESIUM SERPL-MCNC: 1.9 MG/DL (ref 1.6–2.6)
MCH RBC QN AUTO: 31.9 PG (ref 26.8–34.3)
MCHC RBC AUTO-ENTMCNC: 30.3 G/DL (ref 31.4–37.4)
MCV RBC AUTO: 105 FL (ref 82–98)
MONOCYTES # BLD AUTO: 0.27 THOUSAND/ΜL (ref 0.17–1.22)
MONOCYTES NFR BLD AUTO: 5 % (ref 4–12)
NEUTROPHILS # BLD AUTO: 4.84 THOUSANDS/ΜL (ref 1.85–7.62)
NEUTS SEG NFR BLD AUTO: 84 % (ref 43–75)
NRBC BLD AUTO-RTO: 0 /100 WBCS
PCO2 BLD: >103 MM HG (ref 36–44)
PH BLD: 7.03 [PH] (ref 7.35–7.45)
PHOSPHATE SERPL-MCNC: 4.2 MG/DL (ref 2.3–4.1)
PLATELET # BLD AUTO: 86 THOUSANDS/UL (ref 149–390)
PMV BLD AUTO: 9.9 FL (ref 8.9–12.7)
PO2 BLD: 175 MM HG (ref 75–129)
POTASSIUM BLD-SCNC: 3.8 MMOL/L (ref 3.5–5.3)
POTASSIUM SERPL-SCNC: 3.9 MMOL/L (ref 3.5–5.3)
RBC # BLD AUTO: 3.89 MILLION/UL (ref 3.81–5.12)
SODIUM BLD-SCNC: 138 MMOL/L (ref 136–145)
SODIUM SERPL-SCNC: 137 MMOL/L (ref 136–145)
SPECIMEN SOURCE: ABNORMAL
WBC # BLD AUTO: 5.7 THOUSAND/UL (ref 4.31–10.16)

## 2020-07-14 PROCEDURE — 94664 DEMO&/EVAL PT USE INHALER: CPT

## 2020-07-14 PROCEDURE — NC001 PR NO CHARGE: Performed by: INTERNAL MEDICINE

## 2020-07-14 PROCEDURE — 99239 HOSP IP/OBS DSCHRG MGMT >30: CPT | Performed by: INTERNAL MEDICINE

## 2020-07-14 PROCEDURE — 84132 ASSAY OF SERUM POTASSIUM: CPT

## 2020-07-14 PROCEDURE — 85014 HEMATOCRIT: CPT

## 2020-07-14 PROCEDURE — 84100 ASSAY OF PHOSPHORUS: CPT | Performed by: INTERNAL MEDICINE

## 2020-07-14 PROCEDURE — 82803 BLOOD GASES ANY COMBINATION: CPT

## 2020-07-14 PROCEDURE — 82948 REAGENT STRIP/BLOOD GLUCOSE: CPT

## 2020-07-14 PROCEDURE — 82947 ASSAY GLUCOSE BLOOD QUANT: CPT

## 2020-07-14 PROCEDURE — 80048 BASIC METABOLIC PNL TOTAL CA: CPT | Performed by: INTERNAL MEDICINE

## 2020-07-14 PROCEDURE — 94002 VENT MGMT INPAT INIT DAY: CPT

## 2020-07-14 PROCEDURE — 99233 SBSQ HOSP IP/OBS HIGH 50: CPT | Performed by: PHYSICIAN ASSISTANT

## 2020-07-14 PROCEDURE — 82330 ASSAY OF CALCIUM: CPT

## 2020-07-14 PROCEDURE — 83735 ASSAY OF MAGNESIUM: CPT | Performed by: INTERNAL MEDICINE

## 2020-07-14 PROCEDURE — 94760 N-INVAS EAR/PLS OXIMETRY 1: CPT

## 2020-07-14 PROCEDURE — 84295 ASSAY OF SERUM SODIUM: CPT

## 2020-07-14 PROCEDURE — 85025 COMPLETE CBC W/AUTO DIFF WBC: CPT | Performed by: INTERNAL MEDICINE

## 2020-07-14 RX ORDER — GLYCOPYRROLATE 0.2 MG/ML
0.1 INJECTION INTRAMUSCULAR; INTRAVENOUS
Status: DISCONTINUED | OUTPATIENT
Start: 2020-07-14 | End: 2020-07-14 | Stop reason: HOSPADM

## 2020-07-14 RX ORDER — LORAZEPAM 2 MG/ML
1 INJECTION INTRAMUSCULAR EVERY 2 HOUR PRN
Status: DISCONTINUED | OUTPATIENT
Start: 2020-07-14 | End: 2020-07-14 | Stop reason: HOSPADM

## 2020-07-14 RX ORDER — GLYCOPYRROLATE 1 MG/1
1 TABLET ORAL 3 TIMES DAILY
Status: DISCONTINUED | OUTPATIENT
Start: 2020-07-14 | End: 2020-07-14

## 2020-07-14 RX ADMIN — METRONIDAZOLE 500 MG: 500 INJECTION, SOLUTION INTRAVENOUS at 05:43

## 2020-07-14 RX ADMIN — LEVETIRACETAM 500 MG: 100 INJECTION, SOLUTION INTRAVENOUS at 10:32

## 2020-07-14 RX ADMIN — MORPHINE SULFATE 2 MG: 2 INJECTION, SOLUTION INTRAMUSCULAR; INTRAVENOUS at 09:24

## 2020-07-14 RX ADMIN — METOPROLOL TARTRATE 5 MG: 5 INJECTION INTRAVENOUS at 05:47

## 2020-07-14 RX ADMIN — MORPHINE SULFATE 2 MG: 2 INJECTION, SOLUTION INTRAMUSCULAR; INTRAVENOUS at 13:48

## 2020-07-14 RX ADMIN — GLYCOPYRROLATE 0.1 MG: 0.2 INJECTION, SOLUTION INTRAMUSCULAR; INTRAVENOUS at 11:38

## 2020-07-14 RX ADMIN — HEPARIN SODIUM 5000 UNITS: 5000 INJECTION INTRAVENOUS; SUBCUTANEOUS at 05:47

## 2020-07-14 NOTE — PROGRESS NOTES
Renal short note    Reviewed chart and note  Pt with rapid response overnight  Family at bedside  Pt is lethargic  On BIPAP  Sig resp acidosis overnight  Electrolytes stable this AM    Per review with family, they will be pursuing hospice/comfort care  They agree that dialysis is not in the patient's best interest nor goals at this juncture  I explained that we will still follow pt daily while she is inpatient but for now will plan to hold any dialysis unless pt's clinical status changes which is within the family's goals for the patient and family agrees with this plan

## 2020-07-14 NOTE — HOSPICE NOTE
Hospice referral received  Pt assessed and approved for ipu by Dr Kristine Lewis  Initially no IPU beds available, but bed became available  Family at bedside and in agreement with pt going to [facility]  Consents signed by POA daughter, Dahiana Reyez  Faxed consents to inpatient unit, original DNR and copies of consents given to CM  CM will set up transport and advise family of transport time  Nurse will need to call report to [facility] prior to transport at [phone]  Emotional support provided to family

## 2020-07-14 NOTE — RESPIRATORY THERAPY NOTE
RT Protocol Note  Nelta Litten 70 y o  female MRN: [id number]  Unit/Bed#: [facility] 913-01 Encounter: [id number]    Assessment    Principal Problem:    Status post fall  Active Problems:    ESRD (end stage renal disease)     Dementia    Elevated troponin    Acute rhabdomyolysis    Seizures    Rheumatoid arthritis    Pancytopenia     Severe protein-calorie malnutrition    Essential hypertension    Aspiration pneumonia       Home Pulmonary Medications:  None on File       Past Medical History:   Diagnosis Date    Dialysis patient ([place] [address] )     Epilepsy ([place] [address] )     Kidney disease     Seizure ([place] [address] )      Social History     Socioeconomic History    Marital status: Unknown     Spouse name: None    Number of children: None    Years of education: None    Highest education level: None   Occupational History    None   Social Needs    Financial resource strain: None    Food insecurity:     Worry: None     Inability: None    Transportation needs:     Medical: None     Non-medical: None   Tobacco Use    Smoking status: Unknown If Ever Smoked   Substance and Sexual Activity    Alcohol use: Not Currently    Drug use: Never    Sexual activity: None   Lifestyle    Physical activity:     Days per week: None     Minutes per session: None    Stress: None   Relationships    Social connections:     Talks on phone: None     Gets together: None     Attends [employer] service: None     Active member of club or organization: None     Attends meetings of clubs or organizations: None     Relationship status: None    Intimate partner violence:     Fear of current or ex partner: None     Emotionally abused: None     Physically abused: None     Forced sexual activity: None   Other Topics Concern    None   Social History Narrative    None       Subjective         Objective    Physical Exam:   Assessment Type: Assess only  Respiratory Pattern: Irregular, Labored, Dyspnea at rest, Dyspnea with exertion, Symmetrical  Chest Assessment: Chest expansion symmetrical, Trachea midline  Bilateral Breath Sounds: Diminished  R Breath Sounds: Diminished  L Breath Sounds: Diminished  Cough: None  O2 Device: Bipap    Vitals:  Blood pressure (!) 153/47, pulse 86, temperature 99 5 °F (37 5 °C), resp  rate 21, height 5' 4" (1 626 m), weight 42 5 kg (93 lb 11 1 oz), SpO2 99 %  Imaging and other studies: I have personally reviewed pertinent reports  O2 Device: Bipap     Plan    Respiratory Plan: Vent/NIV/HFNC(Bipap)        Resp Comments: Placed on Bipap during RRT due to ABG results and pts decreased WOB

## 2020-07-14 NOTE — SOCIAL WORK
Accepted at [address], they can transport at 1:45  Daughter notified - in room with patient    CMN form completed   Copy to Medical Records

## 2020-07-14 NOTE — PROGRESS NOTES
Spoke with patient's partner and daughter Steff Walker over the phone to provide update and determine next steps  They will be coming in to the hospital tonight, will keep patient on bipap until family arrival at bedside  Patient's daughter Jaron Thomson and patient's partner Alberta Suarez at bedside, other daughter Steff Walker on her way to hospital  Family would like to wait until they can reach their sister (patient's 2rd daughter) before removing bipap and making patient comfortable  She appears comfortable however discussed with family and will order morphine or ativan if any signs of distress

## 2020-07-14 NOTE — SOCIAL WORK
A post acute care recommendation was made by your care team for hospice  Discussed [place] [facility] with patients dghters and SO  List of providers offered, they would like [facility]    [facility] [address]

## 2020-07-14 NOTE — RAPID RESPONSE
Progress Note - Rapid Response   Gonzalo Rosenberg 70 y o  female MRN: [id number]    Time Called ( Time): 1380  Date Called: 7/13/20  Level of Care: MS  Room#: 229  QYAXSLG Time ( Time): 8247  Event End Time ( Time): 0012  Primary reason for call: Acute change in mental status  Interventions:  Airway/Breathing:  NPPV  Circulation: N/A  Other Treatments: N/A       Assessment:   1  Acute hypercapneic and hypoxic respiratory failure  2  Altered mental status    Plan:   · ABG with severe respiratory acidosis, bipap initiated  · Primary team was able to reach patient's life partner, who stated the family was considering comfort care within the next couple days if no improvement  Unable to reach daughter who is POA during rapid  Will continue bipap for now with likely transition to comfort  Patient already DNR/DNI at time of rapid  HPI/Chief Complaint (Background/Situation):   Gonzalo Rosenberg is a 70y o  year old female who presented initially to the hospital after a fall at her locked dementia unit  Today rapid response was called for altered mental status and hypoxia  ABG with pH 7 0 and CO2 > 100  Historical Information   Past Medical History:   Diagnosis Date    [facility] [address] [facility]     [facility]ile[facility] [address] [facility]     Kidney disease     [facility]      History reviewed  No pertinent surgical history    Social History   Social History     Substance and Sexual Activity   Alcohol Use Not Currently     Social History     Substance and Sexual Activity   Drug Use Never     Social History     Tobacco Use   Smoking Status Unknown If Ever Smoked     Family History: non-contributory    Meds/Allergies     Current Facility-Administered Medications:  acetaminophen 975 mg Oral Q6H PRN Kristina Kim PA-C    amLODIPine 2 5 mg Oral Daily Katie Horan MD    b complex-vitamin C-folic acid 1 capsule Oral Daily With Dinner Deng Zaman PA-C    calcium carbonate 1,000 mg Oral Daily PRN Citlalli Agudelo RYAN    cefepime 1,000 mg Intravenous Q24H Fredi Ramos MD Last Rate: 1,000 mg (07/13/20 2028)   cholestyramine sugar free 4 g Oral BID Deng Zaman PA-C    folic acid 1 mg Oral Daily Deng Zaman PA-C    heparin (porcine) 5,000 Units Subcutaneous Q8H [address] Deng Zaman PA-C    hydrALAZINE 5 mg Intravenous Q6H PRN Fredi Ramos MD    hydroxychloroquine 200 mg Oral Daily With Breakfast Deng Zaman PA-C    levETIRAcetam 500 mg Intravenous Once per day on Sun Tue Thu Sat Claudia Petty Zaman PA-C Last Rate: 500 mg (07/12/20 0850)   levETIRAcetam 750 mg Intravenous Once per day on Mon Wed Fri Claudette Frames, MD Last Rate: Stopped (07/13/20 1501)   lidocaine 1 patch Topical Daily Lindsey Masha, DO    LORazepam 2 mg Intravenous Once Scott Joey, DO    metoprolol 5 mg Intravenous Q6H Deng Zaman PA-C    metroNIDAZOLE 500 mg Intravenous Q8H Fredi Ramos MD Last Rate: 500 mg (07/13/20 2142)   ondansetron 4 mg Intravenous Q6H PRN Deng Zaman PA-C    pantoprazole 40 mg Intravenous Q24H [address] Fredi Ramos MD    senna 1 tablet Oral Daily Deng Zaman PA-C    traZODone 50 mg Oral HS Deng Zaman PA-C             No Known Allergies    ROS: unable to obtain    Vitals:   Vitals:    07/14/20 0024   BP:    Pulse: 86   Resp: 21   Temp:    SpO2: 99%         Physical Exam:  Gen: Cachectic, elderly female  Unresponsive     HEENT: NCAT  Neck: +JVD  Chest: RRR, R dialysis catheter in place  Cor: Hypopnea  Abd: Soft, ND  Neuro: No response to painful stimuli, no blink to threat  Skin: Warm and dry      Intake/Output Summary (Last 24 hours) at 7/14/2020 0039  Last data filed at 7/13/2020 1816  Gross per 24 hour   Intake 700 ml   Output 1437 ml   Net -737 ml       Respiratory    Lab Data (Last 4 hours)    None         O2/Vent Data (Last 4 hours)      07/14 0024          Non-Invasive Ventilation Mode BiPAP                 Invasive Devices     Peripheral Intravenous Line            Peripheral IV 07/10/20 Dorsal (posterior); Left Hand 3 days          Hemodialysis Catheter            HD Permanent Double Catheter -- days                DIAGNOSTIC DATA:    Lab: I have personally reviewed pertinent lab results  CBC:   Results from last 7 days   Lab Units 07/14/20  0001 07/13/20  0852   WBC Thousand/uL  --  3 16*   HEMOGLOBIN g/dL  --  10 8*   I STAT HEMOGLOBIN g/dl 13 3  --    HEMATOCRIT %  --  34 7*   HEMATOCRIT, ISTAT % 39  --    PLATELETS Thousands/uL  --  68*     CMP:   Results from last 7 days   Lab Units 07/14/20  0001 07/13/20  0852 07/12/20  0455 07/11/20  0446   POTASSIUM mmol/L  --  4 0 3 0* 3 2*   CHLORIDE mmol/L  --  107 105 102   CO2 mmol/L  --  23 27 29   BUN mg/dL  --  24 20 13   CREATININE mg/dL  --  2 41* 2 09* 1 61*   CALCIUM mg/dL  --  8 6 8 6 8 5   GLUCOSE, ISTAT mg/dl 112  --   --   --      PT/INR:   No results found for: PT, INR,   Magnesium: No components found for: MAG,   Phosphorous:   Lab Results   Component Value Date    PHOS 2 8 07/13/2020       Microbiology:  Lab Results   Component Value Date    BLOODCX No Growth After 5 Days  07/08/2020    BLOODCX No Growth After 5 Days  07/08/2020    BLOODCX No Growth After 5 Days  07/05/2020         OUTCOME:   Stayed in room   Family notified of transfer: yes  Family member contacted: Life partner - attempted daughter x 3 with no response  Code Status: Level 3 - DNAR and DNI  Critical Care Time: Total Critical Care time spent 16 minutes excluding procedures, teaching and family updates

## 2020-07-14 NOTE — SPEECH THERAPY NOTE
Records reviewed  Pt s/p rapid response, now on BIPAP and plan is for comfort measures  hospice  D/C SLP follow up

## 2020-07-14 NOTE — PLAN OF CARE
Problem: Prexisting or High Potential for Compromised Skin Integrity  Goal: Skin integrity is maintained or improved  Description  INTERVENTIONS:  - Identify patients at risk for skin breakdown  - Assess and monitor skin integrity  - Assess and monitor nutrition and hydration status  - Monitor labs   - Assess for incontinence   - Turn and reposition patient  - Assist with mobility/ambulation  - Relieve pressure over bony prominences  - Avoid friction and shearing  - Provide appropriate hygiene as needed including keeping skin clean and dry  - Evaluate need for skin moisturizer/barrier cream  - Collaborate with interdisciplinary team   - Patient/family teaching  - Consider wound care consult   Outcome: Progressing     Problem: Potential for Falls  Goal: Patient will remain free of falls  Description  INTERVENTIONS:  - Assess patient frequently for physical needs  -  Identify cognitive and physical deficits and behaviors that affect risk of falls    -  [place] fall precautions as indicated by assessment   - Educate patient/family on patient safety including physical limitations  - Instruct patient to call for assistance with activity based on assessment  - Modify environment to reduce risk of injury  - Consider OT/PT consult to assist with strengthening/mobility  Outcome: Progressing     Problem: PAIN - ADULT  Goal: Verbalizes/displays adequate comfort level or baseline comfort level  Description  Interventions:  - Encourage patient to monitor pain and request assistance  - Assess pain using appropriate pain scale  - Administer analgesics based on type and severity of pain and evaluate response  - Implement non-pharmacological measures as appropriate and evaluate response  - Consider cultural and social influences on pain and pain management  - Notify physician/advanced practitioner if interventions unsuccessful or patient reports new pain  Outcome: Progressing     Problem: SAFETY ADULT  Goal: Maintain or return to baseline ADL function  Description  INTERVENTIONS:  -  Assess patient's ability to carry out ADLs; assess patient's baseline for ADL function and identify physical deficits which impact ability to perform ADLs (bathing, care of mouth/teeth, toileting, grooming, dressing, etc )  - Assess/evaluate cause of self-care deficits   - Assess range of motion  - Assess patient's mobility; develop plan if impaired  - Assess patient's need for assistive devices and provide as appropriate  - Encourage maximum independence but intervene and supervise when necessary  - Involve family in performance of ADLs  - Assess for home care needs following discharge   - Consider OT consult to assist with ADL evaluation and planning for discharge  - Provide patient education as appropriate  Outcome: Progressing  Goal: Maintain or return mobility status to optimal level  Description  INTERVENTIONS:  - Assess patient's baseline mobility status (ambulation, transfers, stairs, etc )    - Identify cognitive and physical deficits and behaviors that affect mobility  - Identify mobility aids required to assist with transfers and/or ambulation (gait belt, sit-to-stand, lift, walker, cane, etc )  - [place] fall precautions as indicated by assessment  - Record patient progress and toleration of activity level on Mobility SBAR; progress patient to next Phase/Stage  - Instruct patient to call for assistance with activity based on assessment  - Consider rehabilitation consult to assist with strengthening/weightbearing, etc   Outcome: Progressing     Problem: DISCHARGE PLANNING  Goal: Discharge to home or other facility with appropriate resources  Description  INTERVENTIONS:  - Identify barriers to discharge w/patient and caregiver  - Arrange for needed discharge resources and transportation as appropriate  - Identify discharge learning needs (meds, wound care, etc )  - Arrange for interpretive services to assist at discharge as needed  - Refer to Case Management Department for coordinating discharge planning if the patient needs post-hospital services based on physician/advanced practitioner order or complex needs related to functional status, cognitive ability, or social support system  Outcome: Progressing     Problem: Knowledge Deficit  Goal: Patient/family/caregiver demonstrates understanding of disease process, treatment plan, medications, and discharge instructions  Description  Complete learning assessment and assess knowledge base  Interventions:  - Provide teaching at level of understanding  - Provide teaching via preferred learning methods  Outcome: Progressing     Problem: Nutrition/Hydration-ADULT  Goal: Nutrient/Hydration intake appropriate for improving, restoring or maintaining nutritional needs  Description  Monitor and assess patient's nutrition/hydration status for malnutrition  Collaborate with interdisciplinary team and initiate plan and interventions as ordered  Monitor patient's weight and dietary intake as ordered or per policy  Utilize nutrition screening tool and intervene as necessary  Determine patient's food preferences and provide high-protein, high-caloric foods as appropriate       INTERVENTIONS:  - Monitor oral intake, urinary output, labs, and treatment plans  - Assess nutrition and hydration status and recommend course of action  - Evaluate amount of meals eaten  - Assist patient with eating if necessary   - Allow adequate time for meals  - Recommend/ encourage appropriate diets, oral nutritional supplements, and vitamin/mineral supplements  - Order, calculate, and assess calorie counts as needed  - Recommend, monitor, and adjust tube feedings and TPN/PPN based on assessed needs  - Assess need for intravenous fluids  - Provide specific nutrition/hydration education as appropriate  - Include patient/family/caregiver in decisions related to nutrition  Outcome: Progressing     Problem: METABOLIC, FLUID AND ELECTROLYTES - ADULT  Goal: Electrolytes maintained within normal limits  Description  INTERVENTIONS:  - Monitor labs and assess patient for signs and symptoms of electrolyte imbalances  - Administer electrolyte replacement as ordered  - Monitor response to electrolyte replacements, including repeat lab results as appropriate  - Instruct patient on fluid and nutrition as appropriate  Outcome: Progressing  Goal: Fluid balance maintained  Description  INTERVENTIONS:  - Monitor labs   - Monitor I/O and WT  - Instruct patient on fluid and nutrition as appropriate  - Assess for signs & symptoms of volume excess or deficit  Outcome: Progressing

## 2020-07-14 NOTE — PHYSICAL THERAPY NOTE
Physical Therapy Cancellation Note      Orders received and chart reviewed  Pt noted to be pursuing hospice upon d/c and has been changed to Level 4 Comfort Care  Will D/C PT at this time  Please re-consult if appropriate      Jeff Jeffrey, PT, DPT

## 2020-07-14 NOTE — QUICK NOTE
Emergency Department Airway Evaluation and Management Form    History  Obtained from: NurseLINDA team  Review of patient's allergies indicates:  [unfilled], As per EHR, which was reviewed  , No Known Allergies    Chief Complaint:  RAPID RESPONSE    HPI: Pt is a 70 y o  female admitted for fall at her locked dementia unit  Called rapid today because patient was altered and having hypoxia to 80s  Placed on 4L and satting 90s  On my eval, not responsive to voice or pain  In poor condition  Would intubate but patient level 3 so will place on Bipap  I have reviewed and agree with the history as documented        Physical Exam    Vitals:    07/14/20 0024   BP:    Pulse: 86   Resp: 21   Temp:    SpO2: 99%     Supplemental Oxygen: 4L-->Bipap    GCS: 3 E1 (eyes are open but nonresponsive) M1 (does not respond to painful stimuli) V1 (no voice)    Neuro: Patient nonarousable;   Psych: Unable to assess  Neck: +JVD  Cardio:  Tachycardia  Respiratory: Respiratory failure; not breathing; using accessory muscles and struggling  Mouth: Open; poor dentition  Would intubate but not candidate for intubation    Monitor:  Tachycardia      ED Medications      Current Facility-Administered Medications:     acetaminophen (TYLENOL) tablet 975 mg, 975 mg, Oral, Q6H PRN, Kristina Kim PA-C    amLODIPine (NORVASC) tablet 2 5 mg, 2 5 mg, Oral, Daily, Suzy Obrien MD    b complex-vitamin C-folic acid (NEPHROCAPS) capsule 1 capsule, 1 capsule, Oral, Daily With Dinner, Santy Zaman PA-C, 1 capsule at 07/11/20 1732    calcium carbonate (TUMS) chewable tablet 1,000 mg, 1,000 mg, Oral, Daily PRN, Deng Zaman PA-C    cefepime (MAXIPIME) 1,000 mg in dextrose 5 % 50 mL IVPB, 1,000 mg, Intravenous, Q24H, Mio Maynard MD, Last Rate: 100 mL/hr at 07/13/20 2028, 1,000 mg at 07/13/20 2028    cholestyramine sugar free (QUESTRAN LIGHT) packet 4 g, 4 g, Oral, BID, Deng Zaman PA-C, 4 g at 03/46/25 6271    folic acid (FOLVITE) tablet 1 mg, 1 mg, Oral, Daily, Deng Zaman PA-C, 1 mg at 07/12/20 8888    heparin (porcine) subcutaneous injection 5,000 Units, 5,000 Units, Subcutaneous, Q8H [address], 5,000 Units at 07/13/20 2142 **AND** [CANCELED] Platelet count, , , Once, Deng Zaman PA-C    hydrALAZINE (APRESOLINE) injection 5 mg, 5 mg, Intravenous, Q6H PRN, Monalisa Mcgovern MD    hydroxychloroquine (PLAQUENIL) tablet 200 mg, 200 mg, Oral, Daily With Breakfast, Deng Zaman PA-C, 200 mg at 07/12/20 0831    levETIRAcetam (KEPPRA) 500 mg in sodium chloride 0 9 % 100 mL IVPB, 500 mg, Intravenous, Once per day on Sun Tue Thu Sat, Deng Zaman PA-C, Last Rate: 400 mL/hr at 07/12/20 0850, 500 mg at 07/12/20 0850    levETIRAcetam (KEPPRA) 750 mg in sodium chloride 0 9 % 100 mL IVPB, 750 mg, Intravenous, Once per day on Mon Wed Fri, Zara Garzon MD, Stopped at 07/13/20 1501    lidocaine (LIDODERM) 5 % patch 1 patch, 1 patch, Topical, Daily, Anshul Dubon DO, 1 patch at 07/13/20 1344    LORazepam (ATIVAN) injection 2 mg, 2 mg, Intravenous, Once, Sunshine Falcon DO    metoprolol (LOPRESSOR) injection 5 mg, 5 mg, Intravenous, Q6H, Deng Zaman PA-C, 5 mg at 07/13/20 2334    metroNIDAZOLE (FLAGYL) IVPB (premix) 500 mg 100 mL, 500 mg, Intravenous, Q8H, Monalisa Mcgovern MD, Last Rate: 200 mL/hr at 07/13/20 2142, 500 mg at 07/13/20 2142    ondansetron (ZOFRAN) injection 4 mg, 4 mg, Intravenous, Q6H PRN, Deng Zaman PA-C    pantoprazole (PROTONIX) injection 40 mg, 40 mg, Intravenous, Q24H [address], Monalisa Mcgovern MD, 40 mg at 07/13/20 1335    senna (SENOKOT) tablet 8 6 mg, 1 tablet, Oral, Daily, Deng Zaman PA-C, 8 6 mg at 07/12/20 0832    traZODone (DESYREL) tablet 50 mg, 50 mg, Oral, HS, Deng Zaman PA-C, 50 mg at 07/11/20 2100      Intubation    No intubation required because patient is DNR/DNI  Patient placed on Bipap      Final Diagnosis:  Acute Respiratory Failure requiring Bipap  AMS  Possible Seizure Like Activity    CC following but sounds like goals of care discussion       ED Provider  Electronically Signed by Jess Hadley

## 2020-07-14 NOTE — DISCHARGE SUMMARY
Discharge- Cecelia James 1945, 70 y o  female MRN: [id number]    Unit/Bed#: [facility] 913-01 Encounter: [id number]    Primary Care Provider: No primary care provider on file  Date and time admitted to hospital: 7/5/2020  8:22 AM      Discharge Summary - [facility] Internal Medicine    Patient Information: Cecelia James 70 y o  female MRN: [id number]  Unit/Bed#: [facility] 913-01 Encounter: [id number]    Discharging Physician / Practitioner: Festus Reece DO  PCP: No primary care provider on file  Admission Date: 7/5/2020  Discharge Date: 07/14/20    Disposition:   Inpatient hospice    Reason for Admission:   Unwitnessed fall  Hypertensive urgency  Rhabdomyolysis      Discharge Diagnoses:     Principal Problem:    Status post fall  Active Problems:    ESRD (end stage renal disease)     Dementia    Elevated troponin    Acute rhabdomyolysis    Seizures    Rheumatoid arthritis    Pancytopenia     Severe protein-calorie malnutrition    Essential hypertension    Aspiration pneumonia   Resolved Problems:    * No resolved hospital problems  *      Consultations During Hospital Stay:  · Nephrology  · Geriatric  · Palliative care    Procedures Performed:     · Head CT scan no acute abnormality chronic bilateral cortical infarct  · Cervical spine CT scan no fracture  · Chest x-ray on admission  Increased opacity in the right middle lobe with retraction of the minor fissure, at least in part due to atelectasis  Superimposed pneumonia cannot be excluded      Small left pleural effusion and left base atelectasis  Superimposed pneumonia cannot be excluded    Hospital Course:     Cecelia James is a 70 y o  female patient who originally presented to the hospital on 7/5/2020 due to fall  Patient with underlying dementia, ambulatory dysfunction, end-stage renal disease on hemodialysis, hypertension and seizure she was brought hospital after she was found in her room on the floor and complaining of pain  She was hydrated with IV fluid, kept on HD by Nephrology  She developed aspiration pneumonia on 07/08 and treated with IV antibiotic  However yesterday overnight patient was hypoxic and more lethargy, she was placed on BiPAP, pH is 7 0 to in CO2 more than 103  With Family patient's  wishes is DNR/DNI and  no BiPAP  Family decided with comfort measures and hospice evaluation  Patient is appropriate for inpatient hospice, and she was discharged today  Condition at Discharge: Dying     Discharge Day Visit / Exam:     Subjective:    Patient seen on BiPAP machine  Obtunded  Family at bedside  Vitals: Blood Pressure: (!) 96/48 (07/14/20 0711)  Pulse: 63 (07/14/20 0711)  Temperature: 98 3 °F (36 8 °C) (07/14/20 0711)  Temp Source: Axillary (07/13/20 1215)  Respirations: (!) 24 (07/14/20 0711)  Height: 5' 4" (162 6 cm)(per Care Everywhere records) (07/06/20 1334)  Weight - Scale: 45 9 kg (101 lb 3 1 oz) (07/14/20 0600)  SpO2: 100 % (07/14/20 0730)  Exam:   Physical Exam  Patient on BiPAP machine obtunded  Comfortable in bed  Nonlabored  Discussion with Family:  Daughter    Discharge instructions/Information to patient and family:   See after visit summary for information provided to patient and family  Provisions for Follow-Up Care:  See after visit summary for information related to follow-up care and any pertinent home health orders  Planned Readmission: no     Discharge Statement:  I spent 40 minutes discharging the patient  This time was spent on the day of discharge  I had direct contact with the patient on the day of discharge  Greater than 50% of the total time was spent examining patient, answering all patient questions, arranging and discussing plan of care with patient as well as directly providing post-discharge instructions  Additional time then spent on discharge activities  Discharge Medications:  See after visit summary for reconciled discharge medications provided to patient and family        ** Please Note: This note has been constructed using a voice recognition system **

## 2020-07-14 NOTE — TRANSPORTATION MEDICAL NECESSITY
Section I - General Information    Name of Patient: Schuyler Myers                 : [id number]    Medicare #: [id number]  Transport Date: 20 (PCS is valid for round trips on this date and for all repetitive trips in the 60-day range as noted below )  Origin: [address] [facility] [address]                                                         Destination: [address]  Is the pt's stay covered under Medicare Part A (PPS/DRG)   []     Closest appropriate facility? If no, why is transport to more distant facility required? Yes  If hospice pt, is this transport related to pt's terminal illness? No       Section II - Medical Necessity Questionnaire  Ambulance transportation is medically necessary only if other means of transport are contraindicated or would be potentially harmful to the patient  To meet this requirement, the patient must either be "bed confined" or suffer from a condition such that transport by means other than ambulance is contraindicated by the patient's condition  The following questions must be answered by the medical professional signing below for this form to be valid:    1)  Describe the MEDICAL CONDITION (physical and/or mental) of this patient AT [address] [place] [address] that requires the patient to be transported in an ambulance and why transport by other means is contraindicated by the patient's condition: O2 2l, unresponsive, rhabdomyolysis, seizure disorder, ESRD    2) Is the patient "bed confined" as defined below? Yes  To be "be confined" the patient must satisfy all three of the following conditions: (1) unable to get up from bed without Assistance; AND (2) unable to ambulate; AND (3) unable to sit in a chair or wheelchair  3) Can this patient safely be transported by car or wheelchair van (i e , seated during transport without a medical attendant or monitoring)?    No    4) In addition to completing questions 1-3 above, please check any of the following conditions that apply*:   *Note: supporting documentation for any boxes checked must be maintained in the patient's medical records  If hosp-hosp transfer, describe services needed at 2nd facility not available at 1st facility? Patient is comatose  Requires oxygen-unable to self administer  Unable to tolerate seated position for time needed to transport       Section III - Signature of Physician or Healthcare Professional  I certify that the above information is true and correct based on my evaluation of this patient, and represent that the patient requires transport by ambulance and that other forms of transport are contraindicated  I understand that this information will be used by the Centers for Medicare and Medicaid Services (CMS) to support the determination of medical necessity for ambulance services, and I represent that I have personal knowledge of the patient's condition at time of transport  []  If this box is checked, I also certify that the patient is physically or mentally incapable of signing the ambulance service's claim and that the institution with which I am affiliated has furnished care, services, or assistance to the patient  My signature below is made on behalf of the patient pursuant to 42 CFR §424 36(b)(4)  In accordance with 42 CFR §424 37, the specific reason(s) that the patient is physically or mentally incapable of signing the claim form is as follows: Carolina Dine of Physician* or Healthcare Professional______________________________________________________________  Signature Date 07/14/20 (For scheduled repetitive transports, this form is not valid for transports performed more than 60 days after this date)    Printed Name & Credentials of Physician or Healthcare Professional (MD, DO, RN, etc )__Tessa Grimaldo RN  *Form must be signed by patient's attending physician for scheduled, repetitive transports   For non-repetitive, unscheduled ambulance transports, if unable to obtain the signature of the attending physician, any of the following may sign (choose appropriate option below)  [] Physician Assistant []  Clinical Nurse Specialist [x]  Registered Nurse  []  Nurse Practitioner  [x] Discharge Planner

## 2022-12-01 NOTE — PLAN OF CARE
Problem: Prexisting or High Potential for Compromised Skin Integrity  Goal: Skin integrity is maintained or improved  Description  INTERVENTIONS:  - Identify patients at risk for skin breakdown  - Assess and monitor skin integrity  - Assess and monitor nutrition and hydration status  - Monitor labs   - Assess for incontinence   - Turn and reposition patient  - Assist with mobility/ambulation  - Relieve pressure over bony prominences  - Avoid friction and shearing  - Provide appropriate hygiene as needed including keeping skin clean and dry  - Evaluate need for skin moisturizer/barrier cream  - Collaborate with interdisciplinary team   - Patient/family teaching  - Consider wound care consult   Outcome: Progressing     Problem: Potential for Falls  Goal: Patient will remain free of falls  Description  INTERVENTIONS:  - Assess patient frequently for physical needs  -  Identify cognitive and physical deficits and behaviors that affect risk of falls    -  [place] fall precautions as indicated by assessment   - Educate patient/family on patient safety including physical limitations  - Instruct patient to call for assistance with activity based on assessment  - Modify environment to reduce risk of injury  - Consider OT/PT consult to assist with strengthening/mobility  Outcome: Progressing     Problem: PAIN - ADULT  Goal: Verbalizes/displays adequate comfort level or baseline comfort level  Description  Interventions:  - Encourage patient to monitor pain and request assistance  - Assess pain using appropriate pain scale  - Administer analgesics based on type and severity of pain and evaluate response  - Implement non-pharmacological measures as appropriate and evaluate response  - Consider cultural and social influences on pain and pain management  - Notify physician/advanced practitioner if interventions unsuccessful or patient reports new pain  Outcome: Progressing     Problem: INFECTION - ADULT  Goal: Absence or prevention of progression during hospitalization  Description  INTERVENTIONS:  - Assess and monitor for signs and symptoms of infection  - Monitor lab/diagnostic results  - Monitor all insertion sites, i e  indwelling lines, tubes, and drains  - Monitor endotracheal if appropriate and nasal secretions for changes in amount and color  - [place] appropriate cooling/warming therapies per order  - Administer medications as ordered  - Instruct and encourage patient and family to use good hand hygiene technique  - Identify and instruct in appropriate isolation precautions for identified infection/condition  Outcome: Progressing  Goal: Absence of fever/infection during neutropenic period  Description  INTERVENTIONS:  - Monitor WBC    Outcome: Progressing     Problem: SAFETY ADULT  Goal: Maintain or return to baseline ADL function  Description  INTERVENTIONS:  -  Assess patient's ability to carry out ADLs; assess patient's baseline for ADL function and identify physical deficits which impact ability to perform ADLs (bathing, care of mouth/teeth, toileting, grooming, dressing, etc )  - Assess/evaluate cause of self-care deficits   - Assess range of motion  - Assess patient's mobility; develop plan if impaired  - Assess patient's need for assistive devices and provide as appropriate  - Encourage maximum independence but intervene and supervise when necessary  - Involve family in performance of ADLs  - Assess for home care needs following discharge   - Consider OT consult to assist with ADL evaluation and planning for discharge  - Provide patient education as appropriate  Outcome: Progressing  Goal: Maintain or return mobility status to optimal level  Description  INTERVENTIONS:  - Assess patient's baseline mobility status (ambulation, transfers, stairs, etc )    - Identify cognitive and physical deficits and behaviors that affect mobility  - Identify mobility aids required to assist with transfers and/or ambulation (gait belt, sit-to-stand, lift, walker, cane, etc )  - [place] fall precautions as indicated by assessment  - Record patient progress and toleration of activity level on Mobility SBAR; progress patient to next Phase/Stage  - Instruct patient to call for assistance with activity based on assessment  - Consider rehabilitation consult to assist with strengthening/weightbearing, etc   Outcome: Progressing     Problem: DISCHARGE PLANNING  Goal: Discharge to home or other facility with appropriate resources  Description  INTERVENTIONS:  - Identify barriers to discharge w/patient and caregiver  - Arrange for needed discharge resources and transportation as appropriate  - Identify discharge learning needs (meds, wound care, etc )  - Arrange for interpretive services to assist at discharge as needed  - Refer to Case Management Department for coordinating discharge planning if the patient needs post-hospital services based on physician/advanced practitioner order or complex needs related to functional status, cognitive ability, or social support system  Outcome: Progressing     Problem: Knowledge Deficit  Goal: Patient/family/caregiver demonstrates understanding of disease process, treatment plan, medications, and discharge instructions  Description  Complete learning assessment and assess knowledge base    Interventions:  - Provide teaching at level of understanding  - Provide teaching via preferred learning methods  Outcome: Progressing In no apparent distress.